# Patient Record
Sex: MALE | Race: WHITE | NOT HISPANIC OR LATINO | Employment: OTHER | URBAN - NONMETROPOLITAN AREA
[De-identification: names, ages, dates, MRNs, and addresses within clinical notes are randomized per-mention and may not be internally consistent; named-entity substitution may affect disease eponyms.]

---

## 2017-09-02 ENCOUNTER — HOSPITAL ENCOUNTER (EMERGENCY)
Facility: HOSPITAL | Age: 67
Discharge: HOME | DRG: 872 | End: 2017-09-02
Payer: MEDICARE

## 2017-09-02 VITALS
RESPIRATION RATE: 22 BRPM | DIASTOLIC BLOOD PRESSURE: 88 MMHG | OXYGEN SATURATION: 95 % | SYSTOLIC BLOOD PRESSURE: 130 MMHG | HEART RATE: 93 BPM | TEMPERATURE: 99.4 F

## 2017-09-02 DIAGNOSIS — J20.9 ACUTE BRONCHITIS, UNSPECIFIED ORGANISM: ICD-10-CM

## 2017-09-02 DIAGNOSIS — R31.9 HEMATURIA: ICD-10-CM

## 2017-09-02 LAB
ALBUMIN SERPL-MCNC: 3.5 G/DL (ref 3.4–5)
ALBUMIN UR-MCNC: 100 MG/DL
ALP SERPL-CCNC: 37 U/L (ref 40–150)
ALT SERPL W P-5'-P-CCNC: 31 U/L (ref 0–70)
ANION GAP SERPL CALCULATED.3IONS-SCNC: 8 MMOL/L (ref 3–14)
APPEARANCE UR: CLEAR
AST SERPL W P-5'-P-CCNC: 16 U/L (ref 0–45)
BACTERIA #/AREA URNS HPF: ABNORMAL /HPF
BASOPHILS # BLD AUTO: 0.1 10E9/L (ref 0–0.2)
BASOPHILS NFR BLD AUTO: 0.5 %
BILIRUB SERPL-MCNC: 1.8 MG/DL (ref 0.2–1.3)
BILIRUB UR QL STRIP: NEGATIVE
BUN SERPL-MCNC: 13 MG/DL (ref 7–30)
CALCIUM SERPL-MCNC: 8.7 MG/DL (ref 8.5–10.1)
CHLORIDE SERPL-SCNC: 105 MMOL/L (ref 94–109)
CO2 SERPL-SCNC: 24 MMOL/L (ref 20–32)
COLOR UR AUTO: YELLOW
CREAT SERPL-MCNC: 1.14 MG/DL (ref 0.66–1.25)
CRP SERPL-MCNC: 69.5 MG/L (ref 0–8)
DIFFERENTIAL METHOD BLD: ABNORMAL
EOSINOPHIL # BLD AUTO: 0 10E9/L (ref 0–0.7)
EOSINOPHIL NFR BLD AUTO: 0.2 %
ERYTHROCYTE [DISTWIDTH] IN BLOOD BY AUTOMATED COUNT: 12.4 % (ref 10–15)
GFR SERPL CREATININE-BSD FRML MDRD: 64 ML/MIN/1.7M2
GLUCOSE SERPL-MCNC: 124 MG/DL (ref 70–99)
GLUCOSE UR STRIP-MCNC: NEGATIVE MG/DL
HCT VFR BLD AUTO: 39.7 % (ref 40–53)
HGB BLD-MCNC: 14.4 G/DL (ref 13.3–17.7)
HGB UR QL STRIP: ABNORMAL
IMM GRANULOCYTES # BLD: 0 10E9/L (ref 0–0.4)
IMM GRANULOCYTES NFR BLD: 0.3 %
INR PPP: 1.43 (ref 0.8–1.2)
KETONES UR STRIP-MCNC: NEGATIVE MG/DL
LACTATE SERPL-SCNC: 1.3 MMOL/L (ref 0.4–2)
LEUKOCYTE ESTERASE UR QL STRIP: ABNORMAL
LYMPHOCYTES # BLD AUTO: 0.8 10E9/L (ref 0.8–5.3)
LYMPHOCYTES NFR BLD AUTO: 6.2 %
MCH RBC QN AUTO: 33.3 PG (ref 26.5–33)
MCHC RBC AUTO-ENTMCNC: 36.3 G/DL (ref 31.5–36.5)
MCV RBC AUTO: 92 FL (ref 78–100)
MONOCYTES # BLD AUTO: 1.1 10E9/L (ref 0–1.3)
MONOCYTES NFR BLD AUTO: 8.5 %
MUCOUS THREADS #/AREA URNS LPF: PRESENT /LPF
NEUTROPHILS # BLD AUTO: 11 10E9/L (ref 1.6–8.3)
NEUTROPHILS NFR BLD AUTO: 84.3 %
NITRATE UR QL: NEGATIVE
NRBC # BLD AUTO: 0 10*3/UL
NRBC BLD AUTO-RTO: 0 /100
NT-PROBNP SERPL-MCNC: 1650 PG/ML (ref 0–900)
PH UR STRIP: 6.5 PH (ref 4.7–8)
PLATELET # BLD AUTO: 183 10E9/L (ref 150–450)
POTASSIUM SERPL-SCNC: 3.8 MMOL/L (ref 3.4–5.3)
PROT SERPL-MCNC: 7 G/DL (ref 6.8–8.8)
RBC # BLD AUTO: 4.33 10E12/L (ref 4.4–5.9)
RBC #/AREA URNS AUTO: 10 /HPF (ref 0–2)
SODIUM SERPL-SCNC: 137 MMOL/L (ref 133–144)
SOURCE: ABNORMAL
SP GR UR STRIP: 1.02 (ref 1–1.03)
UROBILINOGEN UR STRIP-MCNC: 2 MG/DL (ref 0–2)
WBC # BLD AUTO: 13 10E9/L (ref 4–11)
WBC #/AREA URNS AUTO: 19 /HPF (ref 0–2)

## 2017-09-02 PROCEDURE — 99285 EMERGENCY DEPT VISIT HI MDM: CPT

## 2017-09-02 PROCEDURE — 93010 ELECTROCARDIOGRAM REPORT: CPT | Performed by: INTERNAL MEDICINE

## 2017-09-02 RX ORDER — LEVOFLOXACIN 5 MG/ML
750 INJECTION, SOLUTION INTRAVENOUS ONCE
Status: COMPLETED | OUTPATIENT
Start: 2017-09-02 | End: 2017-09-02

## 2017-09-02 RX ORDER — LEVOFLOXACIN 500 MG/1
500 TABLET, FILM COATED ORAL DAILY
Qty: 7 TABLET | Refills: 0 | Status: ON HOLD | OUTPATIENT
Start: 2017-09-02 | End: 2017-09-08

## 2017-09-02 RX ORDER — ONDANSETRON 2 MG/ML
4 INJECTION INTRAMUSCULAR; INTRAVENOUS EVERY 30 MIN PRN
Status: DISCONTINUED | OUTPATIENT
Start: 2017-09-02 | End: 2017-09-02 | Stop reason: HOSPADM

## 2017-09-02 RX ORDER — SODIUM CHLORIDE 9 MG/ML
INJECTION, SOLUTION INTRAVENOUS CONTINUOUS
Status: DISCONTINUED | OUTPATIENT
Start: 2017-09-02 | End: 2017-09-02 | Stop reason: HOSPADM

## 2017-09-02 RX ORDER — ACETAMINOPHEN 325 MG/1
975 TABLET ORAL ONCE
Status: DISCONTINUED | OUTPATIENT
Start: 2017-09-02 | End: 2017-09-02 | Stop reason: HOSPADM

## 2017-09-02 RX ADMIN — LEVOFLOXACIN 750 MG: 5 INJECTION, SOLUTION INTRAVENOUS at 17:02

## 2017-09-02 RX ADMIN — ONDANSETRON 4 MG: 2 INJECTION, SOLUTION INTRAMUSCULAR; INTRAVENOUS at 15:15

## 2017-09-02 RX ADMIN — SODIUM CHLORIDE 1000 ML: 9 INJECTION, SOLUTION INTRAVENOUS at 17:02

## 2017-09-02 RX ADMIN — SODIUM CHLORIDE 1000 ML: 9 INJECTION, SOLUTION INTRAVENOUS at 14:44

## 2017-09-02 ASSESSMENT — ENCOUNTER SYMPTOMS
RHINORRHEA: 1
ARTHRALGIAS: 0
FATIGUE: 1
DIFFICULTY URINATING: 0
ABDOMINAL PAIN: 0
CHILLS: 1
NECK STIFFNESS: 0
COUGH: 1
NAUSEA: 1
EYE REDNESS: 0
DIARRHEA: 1
ACTIVITY CHANGE: 1
CONFUSION: 0
COLOR CHANGE: 0
HEADACHES: 0
SINUS PRESSURE: 1
DIZZINESS: 1
SHORTNESS OF BREATH: 1
FEVER: 1
APPETITE CHANGE: 1

## 2017-09-02 NOTE — ED PROVIDER NOTES
History     Chief Complaint   Patient presents with     Fever     up to 100.2 at home     Chills     Nausea     HPI  Dakota Gaston is a 66 year old male with hx of afib on long term coumadin (off for last 2 days - scheduled for TKA 9/7 per Dr. Bender), CAD, s/p stent, HTN, presents to ED with daughter via private car for evaluation of fever, cough, congestion, nausea. Onset of fever and chills during the night last night. States he felt well yesterday, attended football game in rain last night. Fatigued when he got home, fever/chills during the night, cough, nausea, diarrhea this morning. Last dose of tylenol 2 hours PTA. Progressing weakness, fatigue. Last meal last night.     Allergies   Allergen Reactions     Penicillin G        No current facility-administered medications on file prior to encounter.   Current Outpatient Prescriptions on File Prior to Encounter:  metoprolol (TOPROL-XL) 100 MG 24 hr tablet Take 1.5 tablets (150 mg) by mouth daily   OMEPRAZOLE PO Take 40 mg by mouth every morning    SIMVASTATIN PO Take 40 mg by mouth At Bedtime.   warfarin (COUMADIN) 5 MG tablet Take 5 mg by mouth daily    nitroglycerin (NITROSTAT) 0.4 MG SL tablet Place  under the tongue every 5 minutes as needed.       Patient Active Problem List   Diagnosis     History of colon polyps     Subtherapeutic international normalized ratio (INR)     Atrial fibrillation (H)     Atypical chest pain     Renal insufficiency     Elevated brain natriuretic peptide (BNP) level     Influenza B       Past Surgical History:   Procedure Laterality Date     angioplasty with stenting[       arthroscopy left knee[      meniscus tear.     ARTHROTOMY SHOULDER, ROTATOR CUFF REPAIR, COMBINED      spur removed, right shoulder.     CARDIAC SURGERY       COLONOSCOPY       COLONOSCOPY  4/5/2013    Procedure: COLONOSCOPY;  colonoscopy with Polypectomy;  Surgeon: Vickey Martinez MD;  Location: [facility]     COLONOSCOPY N/A 8/14/2015    Procedure:  "COLONOSCOPY;  Surgeon: Vickey Martinez MD;  Location: [facility]     deviated septum repair[       discectomy and fusion[      C3-C6     ENT SURGERY       tube placement in ears[         Social History   Substance Use Topics     Smoking status: Former Smoker     Quit date: 4/1/2003     Smokeless tobacco: Never Used     Alcohol use No      Comment: rona       Most Recent Immunizations   Administered Date(s) Administered     Influenza (High Dose) 3 valent vaccine 02/16/2015     Pneumococcal 23 valent 02/16/2015       BMI: Estimated body mass index is 32.78 kg/(m^2) as calculated from the following:    Height as of 8/14/15: 1.803 m (5' 11\").    Weight as of 8/14/15: 106.6 kg (235 lb).      Review of Systems   Constitutional: Positive for activity change, appetite change, chills, fatigue and fever.   HENT: Positive for congestion, postnasal drip, rhinorrhea and sinus pressure.    Eyes: Negative for redness.   Respiratory: Positive for cough and shortness of breath.    Cardiovascular: Negative for chest pain.   Gastrointestinal: Positive for diarrhea and nausea. Negative for abdominal pain.   Genitourinary: Negative for difficulty urinating.   Musculoskeletal: Negative for arthralgias and neck stiffness.   Skin: Negative for color change.   Neurological: Positive for dizziness. Negative for headaches.   Psychiatric/Behavioral: Negative for confusion.       Physical Exam   BP: 158/97  Pulse: 104  Temp: (!) 102.4  F (39.1  C)  Resp: 24  SpO2: 95 %  Physical Exam   Constitutional: He is oriented to person, place, and time. No distress.   HENT:   Head: Normocephalic and atraumatic.   Right Ear: Tympanic membrane is scarred. Tympanic membrane is not erythematous.   Left Ear: Tympanic membrane is scarred. Tympanic membrane is not erythematous.   Nose: Mucosal edema and rhinorrhea present. Right sinus exhibits frontal sinus tenderness. Left sinus exhibits frontal sinus tenderness.   Mouth/Throat: Uvula is midline and oropharynx " is clear and moist.   Eyes: Conjunctivae are normal.   Neck: Normal range of motion. Neck supple.   Cardiovascular: An irregularly irregular rhythm present. Tachycardia present.    Pulmonary/Chest: He has no wheezes. He has rhonchi in the right middle field and the right lower field.   Abdominal: Soft. Bowel sounds are normal. He exhibits no distension. There is no tenderness.   Musculoskeletal: Normal range of motion.   Neurological: He is alert and oriented to person, place, and time.   Skin: Skin is warm and dry. He is not diaphoretic.   Nursing note and vitals reviewed.      ED Course     Procedures           Labs Ordered and Resulted from Time of ED Arrival Up to the Time of Departure from the ED   UA MACROSCOPIC WITH REFLEX TO MICRO AND CULTURE - Abnormal; Notable for the following:        Result Value    Blood Urine Trace (*)     Protein Albumin Urine 100 (*)     Leukocyte Esterase Urine Small (*)     RBC Urine 10 (*)     WBC Urine 19 (*)     Bacteria Urine None (*)     Mucous Urine Present (*)     All other components within normal limits   CBC WITH PLATELETS DIFFERENTIAL - Abnormal; Notable for the following:     WBC 13.0 (*)     RBC Count 4.33 (*)     Hematocrit 39.7 (*)     MCH 33.3 (*)     Absolute Neutrophil 11.0 (*)     All other components within normal limits   COMPREHENSIVE METABOLIC PANEL - Abnormal; Notable for the following:     Glucose 124 (*)     Bilirubin Total 1.8 (*)     Alkaline Phosphatase 37 (*)     All other components within normal limits   CRP INFLAMMATION - Abnormal; Notable for the following:     CRP Inflammation 69.5 (*)     All other components within normal limits   INR - Abnormal; Notable for the following:     INR 1.43 (*)     All other components within normal limits   NT PROBNP INPATIENT - Abnormal; Notable for the following:     N-Terminal Pro BNP Inpatient 1650 (*)     All other components within normal limits   LACTIC ACID   PERIPHERAL IV CATHETER   PULSE OXIMETRY NURSING    CARDIAC CONTINUOUS MONITORING   NURSING DRAW AND HOLD   NURSING DRAW AND HOLD   NURSING DRAW AND HOLD            EKG Interpretation:      Interpreted by Aleksandra Luis  Time reviewed:1500   Symptoms at time of EKG: fever, nausea   Rhythm: Normal sinus  and Atrial fibrillation - controlled  Rate: 98  Axis: Normal  Ectopy: None  Conduction: Normal  ST Segments/ T Waves: No ST-T wave changes and No acute ischemic changes  Q Waves: None  Comparison to prior: Unchanged    Clinical Impression: atrial fibrillation (chronic)      Assessments & Plan (with Medical Decision Making)   Pt presents with 1 day fever, myalgias, cough, sob, sinus congestion. Temp on arrival 102.4 (t),  - afib per ECG.   Labs show elevated wbc 13.0 with left shift, normal lactic acid. CXR appears clear, rhonchi. UA shows trace amt blood, small amt LE, 19 WBC - pt is asymptomatic. BNP 1650, similar to previous.   Findings consistent with bronchitis, ? Early pneumonia. Will treat with levaquin which will also cover abnormal UA while awaiting culture. Pt verbalizes understanding and agrees with plan.  Epic discharge instructions given. Pt discharged in stable condition.     I have reviewed the nursing notes.    I have reviewed the findings, diagnosis, plan and need for follow up with the patient.    Discharge Medication List as of 9/2/2017  6:32 PM      START taking these medications    Details   levofloxacin (LEVAQUIN) 500 MG tablet Take 1 tablet (500 mg) by mouth daily for 7 days, Disp-7 tablet, R-0, E-Prescribe             Final diagnoses:   Acute bronchitis, unspecified organism   Hematuria     See attached for home care  Rest, increase fluids  Take antibiotic as prescribed  F/U with PCP in 1 week for repeat UA  Return to ED with worsening sx.   9/2/2017   [facility]     Aleksandra Luis APRN FNP  09/03/17 [id number]

## 2017-09-02 NOTE — ED NOTES
DC instructions reviewed with patient and family.  Both verbalize understanding and have no further questions.  Patient is aware that there is an RX at [facility] for him to start tomorrow.  Patient will follow up in clinic in 1 week or will return to the emergency room if symptoms worsen.

## 2017-09-02 NOTE — ED AVS SNAPSHOT
[facility]    [address] [address]    Phone:  [phone]                                       Dakota Gaston   MRN: [id number]    Department:  [facility]   Date of Visit:  9/2/2017           After Visit Summary Signature Page     I have received my discharge instructions, and my questions have been answered. I have discussed any challenges I see with this plan with the nurse or doctor.    ..........................................................................................................................................  Patient/Patient Representative Signature      ..........................................................................................................................................  Patient Representative Print Name and Relationship to Patient    ..................................................               ................................................  Date                                            Time    ..........................................................................................................................................  Reviewed by Signature/Title    ...................................................              ..............................................  Date                                                            Time

## 2017-09-02 NOTE — ED NOTES
Face to face report given with opportunity to observe patient.    Report given to MAIRA Childress   9/2/2017  5:10 PM

## 2017-09-02 NOTE — DISCHARGE INSTRUCTIONS
See attached for home care  Rest, increase fluids  Take antibiotic as prescribed  F/U with PCP in 1 week for repeat UA  Return to ED with worsening sx.   Bronchitis, Antibiotic Treatment (Adult)    Bronchitis is an infection of the air passages (bronchial tubes) in your lungs. It often occurs when you have a cold. This illness is contagious during the first few days and is spread through the air by coughing and sneezing, or by direct contact (touching the sick person and then touching your own eyes, nose, or mouth).  Symptoms of bronchitis include cough with mucus (phlegm) and low-grade fever. Bronchitis usually lasts 7 to 14 days. Mild cases can be treated with simple home remedies. More severe infection is treated with an antibiotic.  Home care  Follow these guidelines when caring for yourself at home:    If your symptoms are severe, rest at home for the first 2 to 3 days. When you go back to your usual activities, don't let yourself get too tired.    Do not smoke. Also avoid being exposed to secondhand smoke.    You may use over-the-counter medicines to control fever or pain, unless another medicine was prescribed. (Note: If you have chronic liver or kidney disease or have ever had a stomach ulcer or gastrointestinal bleeding, talk with your healthcare provider before using these medicines. Also talk to your provider if you are taking medicine to prevent blood clots.) Aspirin should never be given to anyone younger than 18 years of age who is ill with a viral infection or fever. It may cause severe liver or brain damage.    Your appetite may be poor, so a light diet is fine. Avoid dehydration by drinking 6 to 8 glasses of fluids per day (such as water, soft drinks, sports drinks, juices, tea, or soup). Extra fluids will help loosen secretions in the nose and lungs.    Over-the-counter cough, cold, and sore-throat medicines will not shorten the length of the illness, but they may be helpful to reduce symptoms.  (Note: Do not use decongestants if you have high blood pressure.)    Finish all antibiotic medicine. Do this even if you are feeling better after only a few days.  Follow-up care  Follow up with your healthcare provider, or as advised. If you had an X-ray or ECG (electrocardiogram), a specialist will review it. You will be notified of any new findings that may affect your care.  Note: If you are age 65 or older, or if you have a chronic lung disease or condition that affects your immune system, or you smoke, talk to your healthcare provider about having pneumococcal vaccinations and a yearly influenza vaccination (flu shot).  When to seek medical advice  Call your healthcare provider right away if any of these occur:    Fever of 100.4 F (38 C) or higher    Coughing up increased amounts of colored sputum    Weakness, drowsiness, headache, facial pain, ear pain, or a stiff neck  Call 911, or get immediate medical care  Contact emergency services right away if any of these occur.    Coughing up blood    Worsening weakness, drowsiness, headache, or stiff neck    Trouble breathing, wheezing, or pain with breathing  Date Last Reviewed: 9/13/2015 2000-2017 [employer]. [address] [place] [address], [place] [address]. All rights reserved. This information is not intended as a substitute for professional medical care. Always follow your healthcare professional's instructions.

## 2017-09-02 NOTE — ED AVS SNAPSHOT
[facility]    [address] [address]    Phone:  [phone]                                       Dakota Gaston   MRN: [id number]    Department:  [facility]   Date of Visit:  9/2/2017           Patient Information     Date Of Birth          1950        Your diagnoses for this visit were:     Acute bronchitis, unspecified organism     Hematuria        You were seen by Aleksandra Luis APRN FNP.      Follow-up Information     Follow up with Willow Gray MD In 1 week.    Specialty:  Family Practice    Why:  recheck    Contact information:    [facility]  [address] [facility] [address] [phone]          Follow up with [facility].    Specialty:  EMERGENCY MEDICINE    Why:  As needed, If symptoms worsen    Contact information:    [address] [address] [phone]    Additional information:    From [place] Area: Take [address]. Turn left at [address]. Turn left at the first stoplight on [address]. At the first stop sign, take a right onto [address]. Take a left into the parking lot and continue through until you reach the North enterance of the building.       From [place]: Take [address]. Take the [address] ramp towards [place]. Turn left onto [address]. Take a slight right onto [address]. Turn left at the first stoplight on [address]. At the first stop sign, take a right onto [address]. Take a left into the parking lot and continue through until you reach the North enterance of the building.       From [place]: Take [address]. Take a right at [address]. At the first stop sign, take a right onto [address]. Take a left into the parking lot and continue through until you reach the North enterance of the building.         Discharge Instructions         See attached for home care  Rest, increase  fluids  Take antibiotic as prescribed  F/U with PCP in 1 week for repeat UA  Return to ED with worsening sx.   Bronchitis, Antibiotic Treatment (Adult)    Bronchitis is an infection of the air passages (bronchial tubes) in your lungs. It often occurs when you have a cold. This illness is contagious during the first few days and is spread through the air by coughing and sneezing, or by direct contact (touching the sick person and then touching your own eyes, nose, or mouth).  Symptoms of bronchitis include cough with mucus (phlegm) and low-grade fever. Bronchitis usually lasts 7 to 14 days. Mild cases can be treated with simple home remedies. More severe infection is treated with an antibiotic.  Home care  Follow these guidelines when caring for yourself at home:    If your symptoms are severe, rest at home for the first 2 to 3 days. When you go back to your usual activities, don't let yourself get too tired.    Do not smoke. Also avoid being exposed to secondhand smoke.    You may use over-the-counter medicines to control fever or pain, unless another medicine was prescribed. (Note: If you have chronic liver or kidney disease or have ever had a stomach ulcer or gastrointestinal bleeding, talk with your healthcare provider before using these medicines. Also talk to your provider if you are taking medicine to prevent blood clots.) Aspirin should never be given to anyone younger than 18 years of age who is ill with a viral infection or fever. It may cause severe liver or brain damage.    Your appetite may be poor, so a light diet is fine. Avoid dehydration by drinking 6 to 8 glasses of fluids per day (such as water, soft drinks, sports drinks, juices, tea, or soup). Extra fluids will help loosen secretions in the nose and lungs.    Over-the-counter cough, cold, and sore-throat medicines will not shorten the length of the illness, but they may be helpful to reduce symptoms. (Note: Do not use decongestants if you have  high blood pressure.)    Finish all antibiotic medicine. Do this even if you are feeling better after only a few days.  Follow-up care  Follow up with your healthcare provider, or as advised. If you had an X-ray or ECG (electrocardiogram), a specialist will review it. You will be notified of any new findings that may affect your care.  Note: If you are age 65 or older, or if you have a chronic lung disease or condition that affects your immune system, or you smoke, talk to your healthcare provider about having pneumococcal vaccinations and a yearly influenza vaccination (flu shot).  When to seek medical advice  Call your healthcare provider right away if any of these occur:    Fever of 100.4 F (38 C) or higher    Coughing up increased amounts of colored sputum    Weakness, drowsiness, headache, facial pain, ear pain, or a stiff neck  Call 911, or get immediate medical care  Contact emergency services right away if any of these occur.    Coughing up blood    Worsening weakness, drowsiness, headache, or stiff neck    Trouble breathing, wheezing, or pain with breathing  Date Last Reviewed: 9/13/2015 2000-2017 [employer] [address] [place] [address]. All rights reserved. This information is not intended as a substitute for professional medical care. Always follow your healthcare professional's instructions.             Review of your medicines      START taking        Dose / Directions Last dose taken    levofloxacin 500 MG tablet   Commonly known as:  LEVAQUIN   Dose:  500 mg   Quantity:  7 tablet        Take 1 tablet (500 mg) by mouth daily for 7 days   Refills:  0          Our records show that you are taking the medicines listed below. If these are incorrect, please call your family doctor or clinic.        Dose / Directions Last dose taken    COUMADIN 5 MG tablet   Dose:  5 mg   Generic drug:  warfarin        Take 5 mg by mouth daily   Refills:  0        FISH OIL PO        Refills:   0        metoprolol 100 MG 24 hr tablet   Commonly known as:  TOPROL-XL   Dose:  150 mg   Quantity:  135 tablet        Take 1.5 tablets (150 mg) by mouth daily   Refills:  3        nitroGLYcerin 0.4 MG sublingual tablet   Commonly known as:  NITROSTAT        Place  under the tongue every 5 minutes as needed.   Refills:  0        OMEPRAZOLE PO   Dose:  40 mg        Take 40 mg by mouth every morning   Refills:  0        SIMVASTATIN PO   Dose:  40 mg        Take 40 mg by mouth At Bedtime.   Refills:  0                Prescriptions were sent or printed at these locations (1 Prescription)                   [id number] - [place], [place] - [address]   [address] , [place] [address]    Telephone:  [phone]   Fax:  [phone]   Hours:                  E-Prescribed (1 of 1)         levofloxacin (LEVAQUIN) 500 MG tablet                Procedures and tests performed during your visit     Procedure/Test Number of Times Performed    CBC with platelets differential 1    CRP inflammation 1    Cardiac Continuous Monitoring 1    Comprehensive metabolic panel 1    Draw and hold 3    EKG 12 lead 1    INR 1    Lactic acid 1    Nt probnp inpatient (BNP) 1    Peripheral IV: Standard 1    Pulse oximetry nursing 1    UA reflex to Microscopic and Culture 1    Urine Culture Aerobic Bacterial 1    XR Chest 2 Views 1      Orders Needing Specimen Collection     None      Pending Results     Date and Time Order Name Status Description    9/2/2017 1548 Urine Culture Aerobic Bacterial In process     9/2/2017 1441 XR Chest 2 Views In process             Pending Culture Results     Date and Time Order Name Status Description    9/2/2017 1548 Urine Culture Aerobic Bacterial In process             Thank you for choosing [facility]       Thank you for choosing [facility] for your care. Our goal is always to provide you with excellent care. Hearing back from our patients is one way we can continue to improve our services. Please take a few  "minutes to complete the written survey that you may receive in the mail after you visit with us. Thank you!        [employer] Information     [employer] lets you send messages to your doctor, view your test results, renew your prescriptions, schedule appointments and more. To sign up, go to [email or web address] . Click on \"Log in\" on the left side of the screen, which will take you to the Welcome page. Then click on \"Sign up Now\" on the right side of the page.     You will be asked to enter the access code listed below, as well as some personal information. Please follow the directions to create your username and password.     Your access code is: [email or web address]  Expires: 2017  6:29 PM     Your access code will  in 90 days. If you need help or a new code, please call your [facility] clinic or [phone].        Care EveryWhere ID     This is your Care EveryWhere ID. This could be used by other organizations to access your [facility] medical records  [id number]        Equal Access to Services     [facility]: Hadii gabi Small, waaxda luqadaha, qaybta kaalmabita muse, gino noble . So [facility] [place] [phone].    ATENCIÓN: Si habla español, tiene a tomlin disposición servicios gratuitos de asistencia lingüística. Llame al [phone].    We comply with applicable federal civil rights laws and [place] laws. We do not discriminate on the basis of race, color, national origin, age, disability sex, sexual orientation or gender identity.            After Visit Summary       This is your record. Keep this with you and show to your community pharmacist(s) and doctor(s) at your next visit.                  "

## 2017-09-03 ENCOUNTER — HOSPITAL ENCOUNTER (INPATIENT)
Facility: HOSPITAL | Age: 67
LOS: 4 days | Discharge: HOME | DRG: 872 | End: 2017-09-08
Admitting: FAMILY MEDICINE
Payer: MEDICARE

## 2017-09-03 DIAGNOSIS — R31.9 URINARY TRACT INFECTION WITH HEMATURIA, SITE UNSPECIFIED: ICD-10-CM

## 2017-09-03 DIAGNOSIS — N39.0 URINARY TRACT INFECTION WITH HEMATURIA, SITE UNSPECIFIED: ICD-10-CM

## 2017-09-03 DIAGNOSIS — R78.81 POSITIVE BLOOD CULTURE: ICD-10-CM

## 2017-09-03 DIAGNOSIS — R09.81 CONGESTION OF PARANASAL SINUS: ICD-10-CM

## 2017-09-03 LAB
ALBUMIN SERPL-MCNC: 3.2 G/DL (ref 3.4–5)
ALP SERPL-CCNC: 32 U/L (ref 40–150)
ALT SERPL W P-5'-P-CCNC: 27 U/L (ref 0–70)
ANION GAP SERPL CALCULATED.3IONS-SCNC: 8 MMOL/L (ref 3–14)
AST SERPL W P-5'-P-CCNC: 15 U/L (ref 0–45)
BASOPHILS # BLD AUTO: 0 10E9/L (ref 0–0.2)
BASOPHILS NFR BLD AUTO: 0.3 %
BILIRUB SERPL-MCNC: 1.4 MG/DL (ref 0.2–1.3)
BUN SERPL-MCNC: 15 MG/DL (ref 7–30)
CALCIUM SERPL-MCNC: 8.8 MG/DL (ref 8.5–10.1)
CHLORIDE SERPL-SCNC: 104 MMOL/L (ref 94–109)
CO2 SERPL-SCNC: 23 MMOL/L (ref 20–32)
CREAT SERPL-MCNC: 1.13 MG/DL (ref 0.66–1.25)
D DIMER PPP DDU-MCNC: 246 NG/ML D-DU (ref 0–300)
DIFFERENTIAL METHOD BLD: ABNORMAL
EOSINOPHIL # BLD AUTO: 0 10E9/L (ref 0–0.7)
EOSINOPHIL NFR BLD AUTO: 0 %
ERYTHROCYTE [DISTWIDTH] IN BLOOD BY AUTOMATED COUNT: 12.5 % (ref 10–15)
GFR SERPL CREATININE-BSD FRML MDRD: 65 ML/MIN/1.7M2
GLUCOSE SERPL-MCNC: 188 MG/DL (ref 70–99)
HCT VFR BLD AUTO: 38.1 % (ref 40–53)
HGB BLD-MCNC: 13.3 G/DL (ref 13.3–17.7)
IMM GRANULOCYTES # BLD: 0 10E9/L (ref 0–0.4)
IMM GRANULOCYTES NFR BLD: 0.3 %
INR PPP: 1.4 (ref 0.8–1.2)
LACTATE SERPL-SCNC: 1.8 MMOL/L (ref 0.4–2)
LYMPHOCYTES # BLD AUTO: 0.4 10E9/L (ref 0.8–5.3)
LYMPHOCYTES NFR BLD AUTO: 4.4 %
MCH RBC QN AUTO: 32.5 PG (ref 26.5–33)
MCHC RBC AUTO-ENTMCNC: 34.9 G/DL (ref 31.5–36.5)
MCV RBC AUTO: 93 FL (ref 78–100)
MONOCYTES # BLD AUTO: 0.5 10E9/L (ref 0–1.3)
MONOCYTES NFR BLD AUTO: 5.2 %
NEUTROPHILS # BLD AUTO: 8.9 10E9/L (ref 1.6–8.3)
NEUTROPHILS NFR BLD AUTO: 89.8 %
NRBC # BLD AUTO: 0 10*3/UL
NRBC BLD AUTO-RTO: 0 /100
PLATELET # BLD AUTO: 156 10E9/L (ref 150–450)
POTASSIUM SERPL-SCNC: 3.8 MMOL/L (ref 3.4–5.3)
PROT SERPL-MCNC: 6.7 G/DL (ref 6.8–8.8)
PSA SERPL-ACNC: 8.91 UG/L (ref 0–4)
RBC # BLD AUTO: 4.09 10E12/L (ref 4.4–5.9)
SODIUM SERPL-SCNC: 135 MMOL/L (ref 133–144)
TROPONIN I SERPL-MCNC: <0.015 UG/L (ref 0–0.04)
WBC # BLD AUTO: 9.9 10E9/L (ref 4–11)

## 2017-09-03 PROCEDURE — 99284 EMERGENCY DEPT VISIT MOD MDM: CPT

## 2017-09-03 PROCEDURE — G0378 HOSPITAL OBSERVATION PER HR: HCPCS

## 2017-09-03 PROCEDURE — 80053 COMPREHEN METABOLIC PANEL: CPT

## 2017-09-03 PROCEDURE — 25000132 ZZH RX MED GY IP 250 OP 250 PS 637: Mod: GY

## 2017-09-03 PROCEDURE — 70486 CT MAXILLOFACIAL W/O DYE: CPT | Mod: TC

## 2017-09-03 PROCEDURE — 96376 TX/PRO/DX INJ SAME DRUG ADON: CPT

## 2017-09-03 PROCEDURE — 25000128 H RX IP 250 OP 636: Performed by: FAMILY MEDICINE

## 2017-09-03 PROCEDURE — 85610 PROTHROMBIN TIME: CPT

## 2017-09-03 PROCEDURE — 40000786 ZZHCL STATISTIC ACTIVE MRSA SURVEILLANCE CULTURE: Performed by: FAMILY MEDICINE

## 2017-09-03 PROCEDURE — 83605 ASSAY OF LACTIC ACID: CPT

## 2017-09-03 PROCEDURE — 71020 ZZHC CHEST TWO VIEWS, FRONT/LAT: CPT | Mod: TC

## 2017-09-03 PROCEDURE — G0103 PSA SCREENING: HCPCS

## 2017-09-03 PROCEDURE — 96372 THER/PROPH/DIAG INJ SC/IM: CPT | Mod: XS

## 2017-09-03 PROCEDURE — 99285 EMERGENCY DEPT VISIT HI MDM: CPT | Mod: 25

## 2017-09-03 PROCEDURE — 84484 ASSAY OF TROPONIN QUANT: CPT

## 2017-09-03 PROCEDURE — A9270 NON-COVERED ITEM OR SERVICE: HCPCS | Mod: GY | Performed by: FAMILY MEDICINE

## 2017-09-03 PROCEDURE — 85379 FIBRIN DEGRADATION QUANT: CPT

## 2017-09-03 PROCEDURE — 96375 TX/PRO/DX INJ NEW DRUG ADDON: CPT

## 2017-09-03 PROCEDURE — 96361 HYDRATE IV INFUSION ADD-ON: CPT

## 2017-09-03 PROCEDURE — A9270 NON-COVERED ITEM OR SERVICE: HCPCS | Mod: GY

## 2017-09-03 PROCEDURE — 85025 COMPLETE CBC W/AUTO DIFF WBC: CPT

## 2017-09-03 PROCEDURE — 96365 THER/PROPH/DIAG IV INF INIT: CPT

## 2017-09-03 PROCEDURE — 25000132 ZZH RX MED GY IP 250 OP 250 PS 637: Mod: GY | Performed by: FAMILY MEDICINE

## 2017-09-03 PROCEDURE — 25000128 H RX IP 250 OP 636

## 2017-09-03 RX ORDER — MORPHINE SULFATE 2 MG/ML
2-4 INJECTION, SOLUTION INTRAMUSCULAR; INTRAVENOUS
Status: DISCONTINUED | OUTPATIENT
Start: 2017-09-03 | End: 2017-09-08 | Stop reason: HOSPADM

## 2017-09-03 RX ORDER — CEFTRIAXONE SODIUM 1 G/50ML
1 INJECTION, SOLUTION INTRAVENOUS ONCE
Status: COMPLETED | OUTPATIENT
Start: 2017-09-03 | End: 2017-09-03

## 2017-09-03 RX ORDER — IBUPROFEN 800 MG/1
800 TABLET, FILM COATED ORAL ONCE
Status: COMPLETED | OUTPATIENT
Start: 2017-09-03 | End: 2017-09-03

## 2017-09-03 RX ORDER — SIMVASTATIN 40 MG
40 TABLET ORAL AT BEDTIME
Status: DISCONTINUED | OUTPATIENT
Start: 2017-09-04 | End: 2017-09-08 | Stop reason: HOSPADM

## 2017-09-03 RX ORDER — NITROGLYCERIN 0.4 MG/1
0.4 TABLET SUBLINGUAL EVERY 5 MIN PRN
Status: DISCONTINUED | OUTPATIENT
Start: 2017-09-03 | End: 2017-09-08 | Stop reason: HOSPADM

## 2017-09-03 RX ORDER — PROCHLORPERAZINE MALEATE 5 MG
5 TABLET ORAL EVERY 6 HOURS PRN
Status: DISCONTINUED | OUTPATIENT
Start: 2017-09-03 | End: 2017-09-08 | Stop reason: HOSPADM

## 2017-09-03 RX ORDER — AMOXICILLIN 250 MG
1-2 CAPSULE ORAL 2 TIMES DAILY PRN
Status: DISCONTINUED | OUTPATIENT
Start: 2017-09-03 | End: 2017-09-08 | Stop reason: HOSPADM

## 2017-09-03 RX ORDER — SACCHAROMYCES BOULARDII 250 MG
250 CAPSULE ORAL 2 TIMES DAILY
Status: DISCONTINUED | OUTPATIENT
Start: 2017-09-03 | End: 2017-09-08 | Stop reason: HOSPADM

## 2017-09-03 RX ORDER — NALOXONE HYDROCHLORIDE 0.4 MG/ML
.1-.4 INJECTION, SOLUTION INTRAMUSCULAR; INTRAVENOUS; SUBCUTANEOUS
Status: DISCONTINUED | OUTPATIENT
Start: 2017-09-03 | End: 2017-09-08 | Stop reason: HOSPADM

## 2017-09-03 RX ORDER — ACETAMINOPHEN 325 MG/1
650 TABLET ORAL EVERY 4 HOURS PRN
Status: DISCONTINUED | OUTPATIENT
Start: 2017-09-03 | End: 2017-09-08 | Stop reason: HOSPADM

## 2017-09-03 RX ORDER — IBUPROFEN 200 MG
200 TABLET ORAL EVERY 6 HOURS PRN
Status: DISCONTINUED | OUTPATIENT
Start: 2017-09-03 | End: 2017-09-08 | Stop reason: HOSPADM

## 2017-09-03 RX ORDER — OXYMETAZOLINE HYDROCHLORIDE 0.05 G/100ML
2 SPRAY NASAL 2 TIMES DAILY
Status: DISCONTINUED | OUTPATIENT
Start: 2017-09-03 | End: 2017-09-08 | Stop reason: HOSPADM

## 2017-09-03 RX ORDER — ONDANSETRON 2 MG/ML
4 INJECTION INTRAMUSCULAR; INTRAVENOUS EVERY 6 HOURS PRN
Status: DISCONTINUED | OUTPATIENT
Start: 2017-09-03 | End: 2017-09-08 | Stop reason: HOSPADM

## 2017-09-03 RX ORDER — METOPROLOL SUCCINATE 100 MG/1
200 TABLET, EXTENDED RELEASE ORAL DAILY
Status: DISCONTINUED | OUTPATIENT
Start: 2017-09-04 | End: 2017-09-08 | Stop reason: HOSPADM

## 2017-09-03 RX ORDER — LEVOFLOXACIN 5 MG/ML
750 INJECTION, SOLUTION INTRAVENOUS EVERY 24 HOURS
Status: DISCONTINUED | OUTPATIENT
Start: 2017-09-03 | End: 2017-09-05

## 2017-09-03 RX ORDER — PROCHLORPERAZINE 25 MG
12.5 SUPPOSITORY, RECTAL RECTAL EVERY 12 HOURS PRN
Status: DISCONTINUED | OUTPATIENT
Start: 2017-09-03 | End: 2017-09-08 | Stop reason: HOSPADM

## 2017-09-03 RX ORDER — ONDANSETRON 4 MG/1
4 TABLET, ORALLY DISINTEGRATING ORAL EVERY 6 HOURS PRN
Status: DISCONTINUED | OUTPATIENT
Start: 2017-09-03 | End: 2017-09-08 | Stop reason: HOSPADM

## 2017-09-03 RX ORDER — SODIUM CHLORIDE 9 MG/ML
1000 INJECTION, SOLUTION INTRAVENOUS CONTINUOUS
Status: DISCONTINUED | OUTPATIENT
Start: 2017-09-03 | End: 2017-09-05

## 2017-09-03 RX ORDER — ACETAMINOPHEN 650 MG/1
650 SUPPOSITORY RECTAL EVERY 4 HOURS PRN
Status: DISCONTINUED | OUTPATIENT
Start: 2017-09-03 | End: 2017-09-08 | Stop reason: HOSPADM

## 2017-09-03 RX ORDER — HYDROCODONE BITARTRATE AND ACETAMINOPHEN 5; 325 MG/1; MG/1
1-2 TABLET ORAL EVERY 4 HOURS PRN
Status: DISCONTINUED | OUTPATIENT
Start: 2017-09-03 | End: 2017-09-08 | Stop reason: HOSPADM

## 2017-09-03 RX ORDER — LIDOCAINE 40 MG/G
CREAM TOPICAL
Status: DISCONTINUED | OUTPATIENT
Start: 2017-09-03 | End: 2017-09-08 | Stop reason: HOSPADM

## 2017-09-03 RX ADMIN — SODIUM CHLORIDE 1000 ML: 9 INJECTION, SOLUTION INTRAVENOUS at 13:21

## 2017-09-03 RX ADMIN — IBUPROFEN 200 MG: 200 TABLET, FILM COATED ORAL at 23:45

## 2017-09-03 RX ADMIN — SODIUM CHLORIDE 1000 ML: 9 INJECTION, SOLUTION INTRAVENOUS at 12:16

## 2017-09-03 RX ADMIN — CEFTRIAXONE SODIUM 1 G: 1 INJECTION, SOLUTION INTRAVENOUS at 13:30

## 2017-09-03 RX ADMIN — LEVOFLOXACIN 750 MG: 5 INJECTION, SOLUTION INTRAVENOUS at 16:52

## 2017-09-03 RX ADMIN — ENOXAPARIN SODIUM 40 MG: 40 INJECTION SUBCUTANEOUS at 19:07

## 2017-09-03 RX ADMIN — ACETAMINOPHEN 650 MG: 325 TABLET, FILM COATED ORAL at 19:51

## 2017-09-03 RX ADMIN — Medication 250 MG: at 21:16

## 2017-09-03 RX ADMIN — VANCOMYCIN HYDROCHLORIDE 1750 MG: 1 INJECTION, POWDER, LYOPHILIZED, FOR SOLUTION INTRAVENOUS at 19:07

## 2017-09-03 RX ADMIN — IBUPROFEN 800 MG: 800 TABLET ORAL at 13:17

## 2017-09-03 ASSESSMENT — ENCOUNTER SYMPTOMS
FEVER: 1
CHILLS: 1
ACTIVITY CHANGE: 1
SINUS PRESSURE: 1
SHORTNESS OF BREATH: 1
NAUSEA: 1
APPETITE CHANGE: 1
FATIGUE: 1

## 2017-09-03 NOTE — PLAN OF CARE
[facility] Inpatient Admission Note:    Patient admitted to 3226/3226-1 at approximately 14:15 via cart accompanied by staff from emergency room . Report received from Melva RAO in SBAR format at 14:00 via telephone. Patient transferred to bed via self.. Patient is alert and oriented X 3, denies pain; rates at 0 on 0-10 scale.  Patient oriented to room, unit, hourly rounding, and plan of care. Explained admission packet and patient handbook with patient bill of rights brochure. Will continue to monitor and document as needed.     Inpatient Nursing criteria listed below was met:      Health care directives status obtained and documented: Yes      Care Everywhere authorization obtained N/A      MRSA swab completed for patient 65 years and older: pending      Patient identifies a surrogate decision maker: Yes If yes, who: daughter Lashawn Luque Contact Information:[phone]      Core Measure diagnosis present:No. If yes, state diagnosis: N/A       If initial lactic acid >2.0, repeat lactic acid drawn within one hour of arrival to unit: NA. If no, state reason: N/A      Vaccination assessment and education completed: Yes   Vaccinations received prior to admission: Pneumovax patient unsure when he had last vaccine  Influenza(seasonal)  N/A   Vaccination(s) ordered: not given today because refused      Clergy visit ordered if patient requests: N/A      Skin issues/needs documented: N/A      Isolation Patient: no Education given, correct sign in place and documentation row added to PCS:  N/A      Fall Prevention N/A: Care plan updated, education given and documented, sticker and magnet in place: N/A      Care Plan initiated: Yes      Education Documented (including assessment): Yes      Patient has discharge needs : No If yes, please explain:N/A

## 2017-09-03 NOTE — PHARMACY-VANCOMYCIN DOSING SERVICE
Pharmacy Vancomycin Initial Note  Date of Service September 3, 2017  Patient's  1950  66 year old, male    Indication: Urinary Tract Infection    Current estimated CrCl = Estimated Creatinine Clearance: 82.3 mL/min (based on Cr of 1.13).    Creatinine for last 3 days  2017:  2:58 PM Creatinine 1.14 mg/dL  9/3/2017: 12:15 PM Creatinine 1.13 mg/dL    Recent Vancomycin Level(s) for last 3 days  No results found for requested labs within last 72 hours.      Vancomycin IV Administrations (past 72 hours)      No vancomycin orders with administrations in past 72 hours.                Nephrotoxins and other renal medications (Future)    Start     Dose/Rate Route Frequency Ordered Stop    17 1830  vancomycin (VANCOCIN) 1,750 mg in NaCl 0.9 % 500 mL intermittent infusion      1,750 mg  284 mL/hr over 2 Hours Intravenous EVERY 12 HOURS 17 1658            Contrast Orders - past 72 hours     None                Plan:  1.  Start vancomycin  1750 mg IV q12h.   2.  Goal Trough Level: 10-15 mg/L   3.  Pharmacy will check trough levels as appropriate in 1-3 Days.    4. Serum creatinine levels will be ordered daily for the first week of therapy and at least twice weekly for subsequent weeks.    5. Granger method utilized to dose vancomycin therapy: Method 2 and Method 1    Stefany Joaquin

## 2017-09-03 NOTE — PLAN OF CARE
Problem: Goal Outcome Summary  Goal: Goal Outcome Summary  Outcome: No Change  Patient has just been admitted with febrile illness, sinusitis, and UTI. Patient alert and oriented, makes needs known fever is coming down from ER report, patient very diaphoretic, patient daughter Lashawn Luque is his contact, her number is . Patient has a history of cervical spine fusion and patient has chronic pain, however patient is reporting he is comfortable a present, patient has A Fib, and has not taken fish oil or warfarin due to pending knee surgery next Thursday.

## 2017-09-03 NOTE — IP AVS SNAPSHOT
[facility]    [address] [place] [id number]    Phone:  [phone]    Fax:  [phone]                                       After Visit Summary   9/3/2017    Dakota Gaston    MRN: [id number]           After Visit Summary Signature Page     I have received my discharge instructions, and my questions have been answered. I have discussed any challenges I see with this plan with the nurse or doctor.    ..........................................................................................................................................  Patient/Patient Representative Signature      ..........................................................................................................................................  Patient Representative Print Name and Relationship to Patient    ..................................................               ................................................  Date                                            Time    ..........................................................................................................................................  Reviewed by Signature/Title    ...................................................              ..............................................  Date                                                            Time
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
## 2017-09-03 NOTE — ED NOTES
"67 y/o male presents with sister with c/o fever, UTI and SOB. Patient was seen in ED yesterday - given fluids and Levaquin IV, sent home on oral Levaquin. Patient had positive blood cultures called during the night. Patient reports \"I'm still not feeling better. I was so short of breath I had to sleep in my recliner last night.\" Patient states a nonproductive cough over the last 2 days. Patient reports increased urinary  Frequency with slight blood in his urine yesterday. States he is weak. Patient concerned \"about getting this infection under control. I have a knee replacement on Thursday.\"  "

## 2017-09-03 NOTE — ED NOTES
Report called to 3 center.  Patient's temp is 100.5 and patient is diaphoretic; patient offered no new complaints at this time.  Continues to feel dizzy and weak when standing. IV infusing: Rocephin complete.

## 2017-09-03 NOTE — ED PROVIDER NOTES
History     Chief Complaint   Patient presents with     Fever     was seen yesterday in ED     Shortness of Breath     intermittent SOB     Nasal Congestion     HPI  Dakota Gaston is a 66 year old male with hx of afib (off coumadin last 4 days prepping for TKA scheduled 9/7 with Dr. Bender), CAD with stents, GERD, presents to ED via private car for evaluation of congestion, fever, cough. Sudden onset of sx during the night Friday. States he went to football game, was rained on, developed cough shortly there after. Developed shaking and sob that progressed into yesterday. Was seen here by me with fever of 102.4.  WBC elevated 13, normal lactic acid, his UA was suspicious for UTI. He was given IV fluids and IV Levaquin in the ED and discharged on PO Levaquin and he took 1 dose this morning. Blood cultures were obtained and initial result came back positive this morning. Pt notified to continue with PO Levaquin and return to ED with worsening sx. Pt reports to continued fever, congestion, nausea, fatigue. Denies abdominal pain, no urinary sx.     Allergies   Allergen Reactions     Penicillin G        No current facility-administered medications on file prior to encounter.   Current Outpatient Prescriptions on File Prior to Encounter:  Omega-3 Fatty Acids (FISH OIL PO)    levofloxacin (LEVAQUIN) 500 MG tablet Take 1 tablet (500 mg) by mouth daily for 7 days   metoprolol (TOPROL-XL) 100 MG 24 hr tablet Take 1.5 tablets (150 mg) by mouth daily (Patient taking differently: Take 200 mg by mouth daily )   OMEPRAZOLE PO Take 40 mg by mouth every morning    SIMVASTATIN PO Take 40 mg by mouth At Bedtime.   nitroglycerin (NITROSTAT) 0.4 MG SL tablet Place  under the tongue every 5 minutes as needed.   warfarin (COUMADIN) 5 MG tablet Take 5 mg by mouth daily        Patient Active Problem List   Diagnosis     History of colon polyps     Subtherapeutic international normalized ratio (INR)     Atrial fibrillation (H)      "Atypical chest pain     Renal insufficiency     Elevated brain natriuretic peptide (BNP) level     Influenza B     Urinary tract infection, site not specified       Past Surgical History:   Procedure Laterality Date     angioplasty with stenting[       arthroscopy left knee[      meniscus tear.     ARTHROTOMY SHOULDER, ROTATOR CUFF REPAIR, COMBINED      spur removed, right shoulder.     CARDIAC SURGERY       COLONOSCOPY       COLONOSCOPY  4/5/2013    Procedure: COLONOSCOPY;  colonoscopy with Polypectomy;  Surgeon: Vickey Martinez MD;  Location: [facility]     COLONOSCOPY N/A 8/14/2015    Procedure: COLONOSCOPY;  Surgeon: Vickey Martinez MD;  Location: [facility]     deviated septum repair[       discectomy and fusion[      C3-C6     ENT SURGERY       tube placement in ears[         Social History   Substance Use Topics     Smoking status: Former Smoker     Quit date: 4/1/2003     Smokeless tobacco: Never Used     Alcohol use No      Comment: rona       Most Recent Immunizations   Administered Date(s) Administered     Influenza (High Dose) 3 valent vaccine 02/16/2015     Pneumococcal 23 valent 02/16/2015       BMI: Estimated body mass index is 35.24 kg/(m^2) as calculated from the following:    Height as of this encounter: 1.803 m (5' 11\").    Weight as of this encounter: 114.6 kg (252 lb 10.4 oz).      Review of Systems   Constitutional: Positive for activity change, appetite change, chills, fatigue and fever.   HENT: Positive for congestion and sinus pressure.    Respiratory: Positive for shortness of breath.    Gastrointestinal: Positive for nausea.   Neurological: Positive for weakness.       Physical Exam   BP: 157/86  Heart Rate: 97  Temp: (!) 102.3  F (39.1  C)  Resp: 20  SpO2: 95 %  Physical Exam   Constitutional: He is oriented to person, place, and time. No distress.   HENT:   Head: Normocephalic and atraumatic.   Right Ear: Tympanic membrane is scarred. Tympanic membrane is not erythematous.   Left Ear: " Tympanic membrane is injected and scarred. Tympanic membrane is not erythematous.   Nose: Mucosal edema and rhinorrhea present. Right sinus exhibits maxillary sinus tenderness. Left sinus exhibits maxillary sinus tenderness.   Mouth/Throat: Uvula is midline, oropharynx is clear and moist and mucous membranes are normal.   Eyes: Conjunctivae are normal.   Neck: Neck supple.   Cardiovascular: Normal rate.  An irregularly irregular rhythm present.   Pulmonary/Chest: Effort normal. He has rhonchi in the right middle field.   Abdominal: Soft. Bowel sounds are normal. He exhibits no distension. There is no tenderness.   Musculoskeletal: Normal range of motion.   Neurological: He is alert and oriented to person, place, and time.   Skin: He is not diaphoretic.   Nursing note and vitals reviewed.      ED Course     Procedures          Labs Ordered and Resulted from Time of ED Arrival Up to the Time of Departure from the ED   CBC WITH PLATELETS DIFFERENTIAL - Abnormal; Notable for the following:        Result Value    RBC Count 4.09 (*)     Hematocrit 38.1 (*)     Absolute Neutrophil 8.9 (*)     Absolute Lymphocytes 0.4 (*)     All other components within normal limits   COMPREHENSIVE METABOLIC PANEL - Abnormal; Notable for the following:     Glucose 188 (*)     Bilirubin Total 1.4 (*)     Albumin 3.2 (*)     Protein Total 6.7 (*)     Alkaline Phosphatase 32 (*)     All other components within normal limits   PROSTATE SPEC ANTIGEN SCREEN - Abnormal; Notable for the following:     PSA 8.91 (*)     All other components within normal limits   INR - Abnormal; Notable for the following:     INR 1.40 (*)     All other components within normal limits   LACTIC ACID   TROPONIN I   D-DIMER (FV RANGE)     SINUS CT    FINDINGS:  There is a solitary opacified air cell in the right ethmoid  posteriorly.  The remainder of the paranasal sinuses are clear.  The  nasal septum is midline.  Turbinates appear intact.  Bony orbits are  intact.   The nasal bones appear normal.    IMPRESSION:  OPACIFIED RIGHT ETHMOID AIR CELL POSTERIORLY.  Exam Date: Sep 03, 2017 01:49:00 PM  Author: KATYA WATKINS  This report is final and signed                 XR Chest 2 Views (Final result) Result time: 09/03/17 16:47:25     Final result by Carter Pang MD (09/03/17 16:47:25)     Narrative:         CHEST FRONTAL AND LATERAL VIEWS    HISTORY:  Cough.    COMPARISON:  Today's study is compared to a prior examination which is  dated September 2, 2017.    FINDINGS:  Frontal and lateral views of the chest were obtained.  The  cardiac silhouette is normal in size.  The pulmonary vasculature is  normal and the lungs are clear.    IMPRESSION:  NO ACUTE CARDIOPULMONARY DISEASE.  Exam Date: Sep 03, 2017 12:38:00 AM  Author: CARTER PANG         Assessments & Plan (with Medical D ecision Making)   Pt returns with continued fever, cough, nasal congestion, positive blood culture. He was seen yesterday with similar sx, given IV Levaquin in the ED, sent home on PO, took 1 dose. Temp on arrival 102. 3 (t), nasal congestion, mild rhonchi. Labs show improved wbc, upward trending of CRP.   Given sinus congestion, ct obtained, results showing right ethmoid opacification. CXR clear. UA from yesterday positive for enterococcus with sensitivity pending.   IV fluids initiated.   Consulted with Dr. Paula who recommended hospitalization for IV fluids, antibiotics  given positive blood culture. Pt verbalizes understanding and agrees with plan.  Epic discharge instructions given. Pt discharged in stable condition.   Pt transferred to floor in stable condition via cart.   I have reviewed the nursing notes.    I have reviewed the findings, diagnosis, plan and need for follow up with the patient.    Current Discharge Medication List        Final diagnoses:   Positive blood culture   Urinary tract infection with hematuria, site unspecified   Congestion of paranasal sinus       9/3/2017   [facility]     Aleksandra Luis APRN FNP  09/05/17 1058

## 2017-09-03 NOTE — PLAN OF CARE
Face to face report given with opportunity to observe patient.    Report given to Michael RN and Jailene Ely RN  9/3/2017  3:42 PM

## 2017-09-03 NOTE — IP AVS SNAPSHOT
MRN:[id number]                      After Visit Summary   9/3/2017    Dakota Gaston    MRN: [id number]           Thank you!     Thank you for choosing [facility] for your care. Our goal is always to provide you with excellent care. Hearing back from our patients is one way we can continue to improve our services. Please take a few minutes to complete the written survey that you may receive in the mail after you visit with us. Thank you!        Patient Information     Date Of Birth          1950        Designated Caregiver       Most Recent Value    Caregiver    Will someone help with your care after discharge? yes    Name of designated caregiver ana maria [daughter]    Phone number of caregiver     Caregiver address [place]      About your hospital stay     You were admitted on:  September 3, 2017 You last received care in the:  [facility]    You were discharged on:  September 8, 2017       Who to Call     For medical emergencies, please call 911.  For non-urgent questions about your medical care, please call your primary care provider or clinic, [phone]          Attending Provider     Provider Specialty    Aleksandra Luis APRN FNP Nurse Practitioner - Family    Willow Gray MD Family Practice       Primary Care Provider Office Phone # Fax #    Willow Gray [phone]      Further instructions from your care team       What to expect when you have contrast    During your exam, we will inject  contrast  into your vein or artery. (Contrast is a clear liquid with iodine in it. It shows up on X-rays.)    You may feel warm or hot. You may have a metal taste in your mouth and a slight upset stomach. You may also feel pressure near the kidneys and bladder. These effects will last about 1 to 3 minutes.    Please tell us if you have:    Sneezing     Itching    Hives     Swelling in the face    A hoarse voice    Breathing problems    Other new  symptoms    Serious problems are rare.  They may include:    Irregular heartbeat     Seizures    Kidney failure              Tissue damage    Shock      Death    If you have any problems during the exam, we  will treat them right away.    When you get home    Call your hospital if you have any new symptoms in the next 2 days, like hives or swelling. (Phone numbers are at the bottom of this page.) Or call your family doctor.     If you have wheezing or trouble breathing, call 911.    Self-care  -Drink at least 4 extra glasses of water today.   This reduces the stress on your kidneys.  -Keep taking your regular medicines.    The contrast will pass out of your body in your  Urine(pee). This will happen in the next 24 hours. You  will not feel this. Your urine will not  change color.    If you have kidney problems or take metformin    Drink 4 to 8 large glasses of water for the next  2 days, if you are not on a fluid restriction.    ?If you take metformin (Glucophage or Glucovance) for diabetes, keep taking it.      ?Your kidney function tests are abnormal.  If you take Metformin, do not take it for 48 hours. Please go to your clinic for a blood test within 3 days after your exam before the restarting this medicine.     (Note to provider:please give patient prescription for lab tests.)    ?Special instructions: Drink an extra 4 glasses of water to flush out the contrast.     I have read and understand the above information.    Patient Sign Here:______________________________________Date:________Time:______    Staff Sign Here:________________________________________Date:_______Time:______      Radiology Departments:     ?[facility]: [phone] ?[facility]: [phone]     ?[place]: [phone] ?[facility]:[phone]      ?[facility]: [phone]  ?[facility]: [phone]  ?[facility]:[phone]    ?[facility]:[phone]  ?[facility]:[phone]  You have a lab appointment on Monday September 11, 2017 at 11:00  "am to recheck your INR at the [facility].       You have a follow up appointment scheduled with Dr. Gray on Monday September 18, 2017 at 9:30am at the [facility]. If you have any questions regarding these appointments please call the clinic at [phone].      Pending Results     Date and Time Order Name Status Description    9/5/2017 0614 Blood culture Preliminary     9/5/2017 0614 Blood culture Preliminary     9/4/2017 0000 CT IMAGING - HIM SCAN Preliminary     9/3/2017 0000 CT IMAGING - HIM SCAN Preliminary     9/2/2017 1454 EKG CARDIAC - HIM SCAN Preliminary             Statement of Approval     Ordered          09/08/17 0754  I have reviewed and agree with all the recommendations and orders detailed in this document.  EFFECTIVE NOW     Comments:  F/u w/ me wk of sept 18th, will need UA at that visit   Approved and electronically signed by:  Willow Gray MD             Admission Information     Date & Time Provider Department Dept. Phone    9/3/2017 Willow Gray MD [facility] [phone]      Your Vitals Were     Blood Pressure Temperature Respirations Height Weight Pulse Oximetry    145/85 (BP Location: Right arm) 97.6  F (36.4  C) (Tympanic) 18 1.803 m (5' 11\") 114.2 kg (251 lb 12.3 oz) 94%    BMI (Body Mass Index)                   35.11 kg/m2           [facility] Information     [employer] lets you send messages to your doctor, view your test results, renew your prescriptions, schedule appointments and more. To sign up, go to [email or web address] . Click on \"Log in\" on the left side of the screen, which will take you to the Welcome page. Then click on \"Sign up Now\" on the right side of the page.     You will be asked to enter the access code listed below, as well as some personal information. Please follow the directions to create your username and password.     Your access code is: [email or web address]  Expires: 12/1/2017  6:29 PM     Your " access code will  in 90 days. If you need help or a new code, please call your [facility] clinic or [phone].        Care EveryWhere ID     This is your Care EveryWhere ID. This could be used by other organizations to access your [facility] medical records  [id number]        Equal Access to Services     FRANCESJAVIER JULIO : Hadii aad ku hadasho Soomaali, waaxda luqadaha, qaybta kaalmada adeegyada, waxleslie trupti leroyn shadiajake odonnell aide shah. So [facility] [phone].    ATENCIÓN: Si habla español, tiene a tomlin disposición servicios gratuitos de asistencia lingüística. Llame al [phone].    We comply with applicable federal civil rights laws and [place] laws. We do not discriminate on the basis of race, color, national origin, age, disability sex, sexual orientation or gender identity.               Review of your medicines      START taking        Dose / Directions    nitroFURantoin (macrocrystal-monohydrate) 100 MG capsule   Commonly known as:  MACROBID   Used for:  Urinary tract infection with hematuria, site unspecified        Dose:  100 mg   Take 1 capsule (100 mg) by mouth 2 times daily for 10 days   Quantity:  20 capsule   Refills:  0       saccharomyces boulardii 250 MG capsule   Commonly known as:  FLORASTOR   Used for:  Urinary tract infection with hematuria, site unspecified        Dose:  250 mg   Take 1 capsule (250 mg) by mouth 2 times daily   Quantity:  60 capsule   Refills:  0         CONTINUE these medicines which may have CHANGED, or have new prescriptions. If we are uncertain of the size of tablets/capsules you have at home, strength may be listed as something that might have changed.        Dose / Directions    metoprolol 100 MG 24 hr tablet   Commonly known as:  TOPROL-XL   This may have changed:  how much to take   Used for:  Atrial fibrillation with RVR (H)        Dose:  150 mg   Take 1.5 tablets (150 mg) by mouth daily   Quantity:  135 tablet   Refills:  3         CONTINUE these medicines which  have NOT CHANGED        Dose / Directions    COUMADIN 5 MG tablet   Generic drug:  warfarin        Take 5 mg by mouth Tues, Thurs, Sat and 2.5 mg by mouth the rest of the week.   Refills:  0       FISH OIL PO        Refills:  0       nitroGLYcerin 0.4 MG sublingual tablet   Commonly known as:  NITROSTAT        Place  under the tongue every 5 minutes as needed.   Refills:  0       OMEPRAZOLE PO        Dose:  40 mg   Take 40 mg by mouth every morning   Refills:  0       SIMVASTATIN PO        Dose:  40 mg   Take 40 mg by mouth At Bedtime.   Refills:  0         STOP taking     levofloxacin 500 MG tablet   Commonly known as:  LEVAQUIN                Where to get your medicines      These medications were sent to [id number] [place]  [address]   [id number] , [place] [address]     Phone:  [phone]     nitroFURantoin (macrocrystal-monohydrate) 100 MG capsule    saccharomyces boulardii 250 MG capsule                Protect others around you: Learn how to safely use, store and throw away your medicines at www.disposemymeds.org.             Medication List: This is a list of all your medications and when to take them. Check marks below indicate your daily home schedule. Keep this list as a reference.      Medications           Morning Afternoon Evening Bedtime As Needed    COUMADIN 5 MG tablet   Take 5 mg by mouth Tues, Thurs, Sat and 2.5 mg by mouth the rest of the week.   Last time this was given:  10 mg on 9/7/2017  5:59 PM   Generic drug:  warfarin                                FISH OIL PO                                metoprolol 100 MG 24 hr tablet   Commonly known as:  TOPROL-XL   Take 1.5 tablets (150 mg) by mouth daily   Last time this was given:  200 mg on 9/8/2017  8:57 AM                                nitroFURantoin (macrocrystal-monohydrate) 100 MG capsule   Commonly known as:  MACROBID   Take 1 capsule (100 mg) by mouth 2 times daily for 10 days                                 nitroGLYcerin 0.4 MG sublingual tablet   Commonly known as:  NITROSTAT   Place  under the tongue every 5 minutes as needed.                                OMEPRAZOLE PO   Take 40 mg by mouth every morning   Last time this was given:  40 mg on 9/8/2017  7:08 AM                                saccharomyces boulardii 250 MG capsule   Commonly known as:  FLORASTOR   Take 1 capsule (250 mg) by mouth 2 times daily   Last time this was given:  250 mg on 9/8/2017  8:57 AM                                SIMVASTATIN PO   Take 40 mg by mouth At Bedtime.   Last time this was given:  40 mg on 9/7/2017  9:18 PM                                          More Information        Patient Education    Hydrochlorothiazide Oral capsule    Hydrochlorothiazide Oral tablet  Hydrochlorothiazide Oral tablet  What is this medicine?  HYDROCHLOROTHIAZIDE (sherley droe klor oh THYE a zide) is a diuretic. It increases the amount of urine passed, which causes the body to lose salt and water. This medicine is used to treat high blood pressure. It is also reduces the swelling and water retention caused by various medical conditions, such as heart, liver, or kidney disease.  This medicine may be used for other purposes; ask your health care provider or pharmacist if you have questions.  What should I tell my health care provider before I take this medicine?  They need to know if you have any of these conditions:    diabetes    gout    immune system problems, like lupus    kidney disease or kidney stones    liver disease    pancreatitis    small amount of urine or difficulty passing urine    an unusual or allergic reaction to hydrochlorothiazide, sulfa drugs, other medicines, foods, dyes, or preservatives    pregnant or trying to get pregnant    breast-feeding  How should I use this medicine?  Take this medicine by mouth with a glass of water. Follow the directions on the prescription label. Take your medicine at regular intervals. Remember that you will  need to pass urine frequently after taking this medicine. Do not take your doses at a time of day that will cause you problems. Do not stop taking your medicine unless your doctor tells you to.  Talk to your pediatrician regarding the use of this medicine in children. Special care may be needed.  Overdosage: If you think you have taken too much of this medicine contact a poison control center or emergency room at once.  NOTE: This medicine is only for you. Do not share this medicine with others.  What if I miss a dose?  If you miss a dose, take it as soon as you can. If it is almost time for your next dose, take only that dose. Do not take double or extra doses.  What may interact with this medicine?    cholestyramine    colestipol    digoxin    dofetilide    lithium    medicines for blood pressure    medicines for diabetes    medicines that relax muscles for surgery    other diuretics    steroid medicines like prednisone or cortisone  This list may not describe all possible interactions. Give your health care provider a list of all the medicines, herbs, non-prescription drugs, or dietary supplements you use. Also tell them if you smoke, drink alcohol, or use illegal drugs. Some items may interact with your medicine.  What should I watch for while using this medicine?  Visit your doctor or health care professional for regular checks on your progress. Check your blood pressure as directed. Ask your doctor or health care professional what your blood pressure should be and when you should contact him or her.  You may need to be on a special diet while taking this medicine. Ask your doctor.  Check with your doctor or health care professional if you get an attack of severe diarrhea, nausea and vomiting, or if you sweat a lot. The loss of too much body fluid can make it dangerous for you to take this medicine.  You may get drowsy or dizzy. Do not drive, use machinery, or do anything that needs mental alertness until you  know how this medicine affects you. Do not stand or sit up quickly, especially if you are an older patient. This reduces the risk of dizzy or fainting spells. Alcohol may interfere with the effect of this medicine. Avoid alcoholic drinks.  This medicine may affect your blood sugar level. If you have diabetes, check with your doctor or health care professional before changing the dose of your diabetic medicine.  This medicine can make you more sensitive to the sun. Keep out of the sun. If you cannot avoid being in the sun, wear protective clothing and use sunscreen. Do not use sun lamps or tanning beds/booths.  What side effects may I notice from receiving this medicine?  Side effects that you should report to your doctor or health care professional as soon as possible:    allergic reactions such as skin rash or itching, hives, swelling of the lips, mouth, tongue, or throat    changes in vision    chest pain    eye pain    fast or irregular heartbeat    feeling faint or lightheaded, falls    gout attack    muscle pain or cramps    pain or difficulty when passing urine    pain, tingling, numbness in the hands or feet    redness, blistering, peeling or loosening of the skin, including inside the mouth    unusually weak or tired  Side effects that usually do not require medical attention (report to your doctor or health care professional if they continue or are bothersome):    change in sex drive or performance    dry mouth    headache    stomach upset  This list may not describe all possible side effects. Call your doctor for medical advice about side effects. You may report side effects to FDA at 8-145-FDA-4730.  Where should I keep my medicine?  Keep out of the reach of children.  Store at room temperature between 15 and 30 degrees C (59 and 86 degrees F). Do not freeze. Protect from light and moisture. Keep container closed tightly. Throw away any unused medicine after the expiration date.  NOTE:This sheet is a  summary. It may not cover all possible information. If you have questions about this medicine, talk to your doctor, pharmacist, or health care provider. Copyright  2016 Gold Standard                Saccharomyces boulardii (Florastor) oral dosage forms  What is this medicine?  SACCHAROMYCES boulardii (MARC CORDON sees trinidad LAR belén eye) is a supplement. It is used to help the normal balance of bacteria in the colon. The FDA has not approved this supplement for any medical use.  How should I use this medicine?  Take this medicine by mouth. Follow the directions on the package labeling, or take as directed by your health care professional. Do not take this medicine more often than directed.  Capsules: Swallow capsules whole or mix contents of capsule with water, apple juice or other non-carbonated drinks. Capsule contents can also be added to apple sauce and other soft foods. Do not add to hot beverages.  Granules: Mix contents of packet with water, apple juice, or other non-carbonated drinks. For babies, may add to formula after warmed. Can also be added to apple sauce and other soft baby foods. Do not add to hot beverages.  Chewable tablets: Chew tablet completely before swallowing. Can be crushed and sprinkled on tongue. Can also be added to water, apple juice, or other non-carbonated drinks or apple sauce or other soft foods. Do not add to hot beverages.  Contact your pediatrician regarding the use of this medicine in children. Special care may be needed. This medicine is not recommended for children or infants unless prescribed by a doctor.  What side effects may I notice from receiving this medicine?  Side effects that you should report to your doctor or health care professional as soon as possible:    allergic reactions like skin rash, itching or hives, swelling of the face, lips, or tongue    breathing problems    fever    nausea, vomiting    unusually weak or tired  Side effects that usually do not require  medical attention (report to your doctor or health care professional if they continue or are bothersome):    constipation    increased thirst    mild gas  What may interact with this medicine?    medications for fungal infections, such as fluconazole, itraconazole, ketoconazole, nystatin, or voriconazole.  What if I miss a dose?  If you miss a dose, take it as soon as you can. If it is almost time for your next dose, take only that dose. Do not take double or extra doses.  Where should I keep my medicine?  Keep out of the reach of children.  Store at room temperature under 25 degrees C (77 degrees F). Keep granule packets or capsules closed until time of use. Throw away any unused medicine after the expiration date.  What should I tell my health care provider before I take this medicine?  They need to know if you have any of these conditions:    chronic disease    have a central venous catheter    immune system problems    an unusual or allergic reaction to Saccharomyces boulardii, Lombardi's yeast or other yeast, lactose or milk (Florastor brand contains lactose), other foods, dyes, or preservatives    pregnant or trying to get pregnant    breast-feeding  What should I watch for while using this medicine?  See your doctor if your symptoms do not get better or if they get worse.  If you have allergies to milk or you are sensitive to lactose, do not use the Florastor brand supplement. Florastor contains lactose.  NOTE:This sheet is a summary. It may not cover all possible information. If you have questions about this medicine, talk to your doctor, pharmacist, or health care provider. Copyright  2017 Gold Standard                Patient Education    Nitrofurantoin Oral suspension    Nitrofurantoin Oral tablet    Nitrofurantoin, Macrocrystalline Oral capsule    Nitrofurantoin, Nitrofurantoin, Macrocrystalline Oral capsule  Nitrofurantoin, Nitrofurantoin, Macrocrystalline Oral capsule  What is this  medicine?  NITROFURANTOIN (tom herron) is an antibiotic. It is used to treat urinary tract infections.  This medicine may be used for other purposes; ask your health care provider or pharmacist if you have questions.  What should I tell my health care provider before I take this medicine?  They need to know if you have any of these conditions:    anemia    diabetes    glucose-6-phosphate dehydrogenase deficiency    kidney disease    liver disease    lung disease    other chronic illness    an unusual or allergic reaction to nitrofurantoin, other antibiotics, other medicines, foods, dyes or preservatives    pregnant or trying to get pregnant    breast-feeding  How should I use this medicine?  Take this medicine by mouth with a glass of water. Follow the directions on the prescription label. Take this medicine with food or milk. Take your doses at regular intervals. Do not take your medicine more often than directed. Do not stop taking except on your doctor's advice.  Talk to your pediatrician regarding the use of this medicine in children. While this drug may be prescribed for selected conditions, precautions do apply.  Overdosage: If you think you have taken too much of this medicine contact a poison control center or emergency room at once.  NOTE: This medicine is only for you. Do not share this medicine with others.  What if I miss a dose?  If you miss a dose, take it as soon as you can. If it is almost time for your next dose, take only that dose. Do not take double or extra doses.  What may interact with this medicine?    antacids containing magnesium trisilicate    probenecid    quinolone antibiotics like ciprofloxacin, lomefloxacin, norfloxacin and ofloxacin    sulfinpyrazone  This list may not describe all possible interactions. Give your health care provider a list of all the medicines, herbs, non-prescription drugs, or dietary supplements you use. Also tell them if you smoke, drink alcohol, or  use illegal drugs. Some items may interact with your medicine.  What should I watch for while using this medicine?  Tell your doctor or health care professional if your symptoms do not improve or if you get new symptoms. Drink several glasses of water a day. If you are taking this medicine for a long time, visit your doctor for regular checks on your progress.  If you are diabetic, you may get a false positive result for sugar in your urine with certain brands of urine tests. Check with your doctor.  What side effects may I notice from receiving this medicine?  Side effects that you should report to your doctor or health care professional as soon as possible:    allergic reactions like skin rash or hives, swelling of the face, lips, or tongue    chest pain    cough    difficulty breathing    dizziness, drowsiness    fever or infection    joint aches or pains    pale or blue-tinted skin    redness, blistering, peeling or loosening of the skin, including inside the mouth    tingling, burning, pain, or numbness in hands or feet    unusual bleeding or bruising    unusually weak or tired    yellowing of eyes or skin  Side effects that usually do not require medical attention (report to your doctor or health care professional if they continue or are bothersome):    dark urine    diarrhea    headache    loss of appetite    nausea or vomiting    temporary hair loss  This list may not describe all possible side effects. Call your doctor for medical advice about side effects. You may report side effects to FDA at 0-966-FDA-0464.  Where should I keep my medicine?  Keep out of the reach of children.  Store at room temperature between 15 and 30 degrees C (59 and 86 degrees F). Protect from light. Throw away any unused medicine after the expiration date.  NOTE:This sheet is a summary. It may not cover all possible information. If you have questions about this medicine, talk to your doctor, pharmacist, or health care provider.  Copyright  2016 Gold Standard                Understanding Sepsis  Sepsis is a severe response the body has to an infection. It is most often caused by bacteria. It is also known as septicemia, or systemic inflammatory response syndrome (SIRS). Sepsis is a medical emergency. It needs to be treated right away.  What is sepsis?  Sepsis is when the body reacts to an infection with a severe inflammatory response. It can be caused by bacteria, fungus, or a virus. Sepsis can cause many kinds of problems around the body. It can lead severe low blood pressure (shock) and organ failure. This can lead to death if not treated.  Sepsis is most common in:    Infants and older adults    People with an infection such as pneumonia, meningitis, or a urinary tract infection    People who have an illness such as cancer, AIDS, or diabetes    People being treated with chemotherapy medications or radiation    People who have had a transplant  Symptoms of sepsis  Symptoms of sepsis can include:    Chills and shaking    Rapid heartbeat    Rapid breathing    Shortness of breath    Severe nausea or uncontrolled vomiting    Confusion    Dizziness    Decreased urination    Severe pain, including in the back or joints   Diagnosing sepsis  If your health care provider thinks you may have sepsis, you will be given tests. You may have blood and urine tests. These are done to look for bacteria, viruses, or fungus. You may also have X-rays or other imaging tests. These may be done to look at your organs to locate the source of infection.  Treating sepsis  If you have sepsis, your health care provider will give you antibiotics through a thin, flexible tube put into a vein in your arm (IV). You will also be given fluids through the IV. You may also be given nutrition or other medications through your IV. Your health care provider will talk with you about other treatments you may need. These may include using an oxygen mask or a ventilator to help with  breathing. Treatment may last at least 7 to 10 days. Sepsis must be treated in the hospital.  Date Last Reviewed: 7/15/2015    [id number] [employer]. [address] [place] [address], [place] [address]. All rights reserved. This information is not intended as a substitute for professional medical care. Always follow your healthcare professional's instructions.                Urinary Tract Infections in Men  Urinary tract infections (UTIs) are most often caused by bacteria (germs) that invade the urinary tract. The bacteria may come from outside the body. Or they may travel from the skin outside of rectum into the urethra. Pain in or around the urinary tract is a common symptom for most UTIs. But the only way to know for sure if you have a UTI is to have a urinalysis and urine culture.     Four Types of UTIs    Cystitis: A bladder infection, or cystitis, is often linked to a blockage from an enlarged prostate. You may have an urgent or frequent need to urinate, and bloody urine. Treatment includes antibiotics and medications to relax or shrink the prostate. In some cases, surgery is needed.    Urethritis: This is an infection of the urethra. You may have a discharge from the urethra or burning when you urinate.You may also have pain in the urethra or penis. Urethritis is treated with antibiotics.    Prostatitis: This is an inflammation or infection of the prostate. You may have an urgent or frequent need to urinate, fever, or burning when you urinate. Or you may have a tender prostate, or a vague feeling of pressure. Prostatitis is treated with a range of medications, depending on the cause.    Pyelonephritis: This is a kidney infection. If not treated, it can be serious and damage your kidneys. In severe cases you may be hospitalized. You may have a fever and upper back pain.  Treating a UTI    Medications: Most UTIs are treated with antibiotics. These kill the bacteria. The length of time you need to take them  depends on the type of infection. Take antibiotics exactly as directed until all of the medication is gone. If you do not, the infection may not go away and may become harder to treat. For certain types of UTIs, you may be given other medications to help treat your symptoms.    Lifestyle changes: The lifestyle changes below will help get rid of your current infection. They may also help prevent future UTIs.    Drink plenty of fluids such as water, juice, or other caffeine-free drinks. This helps flush bacteria out of your system.    Empty your bladder when you feel the urge to urinate and before going to sleep. Urine that stays in your bladder promotes infection.    Use condoms during sex. These help prevent UTIs caused by sexually transmitted bacteria.    Keep follow-up appointments with your health care provider. He or she can may do tests to make sure the infection has cleared. If necessary, additional treatment can be started.    Additional treatment: Most UTIs respond to medication. But sometimes a procedure or surgery is needed. This can treat an enlarged prostate, or remove a kidney stone or other blockage. Surgery may also treat problems caused by scarring or long-term infections.    [id number] [employer]. [address] [place] [address], [place] [address]. All rights reserved. This information is not intended as a substitute for professional medical care. Always follow your healthcare professional's instructions.

## 2017-09-03 NOTE — PROGRESS NOTES
DATE:  9/3/2017   TIME OF RECEIPT FROM LAB:  0646 reported to Kellie Ramirez RN  LAB TEST:  Preliminary Blood Culture  LAB VALUE:  1 of 2 bottles gram positive cocci  RESULTS GIVEN WITH READ-BACK TO (PROVIDER):  Dr. EUGENIO Ingram  TIME LAB VALUE REPORTED TO PROVIDER:   0715    Pt was seen in ED on 9/2, received IV Levaquin and discharged on Levaquin 500 mg daily x 7 days.  Report reviewed by Dr. Ingram, wait for ID and susceptibility testing results.  Follow up phone call to patient.  Pt reports still not feeling well, not shaky but feet are chilled and had to sleep in recliner last night.  Has not checked temp.  AMADOR Luis NP updated.

## 2017-09-04 LAB
ALBUMIN SERPL-MCNC: 3 G/DL (ref 3.4–5)
ALP SERPL-CCNC: 27 U/L (ref 40–150)
ALT SERPL W P-5'-P-CCNC: 22 U/L (ref 0–70)
ANION GAP SERPL CALCULATED.3IONS-SCNC: 7 MMOL/L (ref 3–14)
AST SERPL W P-5'-P-CCNC: 15 U/L (ref 0–45)
BACTERIA SPEC CULT: NORMAL
BASOPHILS # BLD AUTO: 0.1 10E9/L (ref 0–0.2)
BASOPHILS NFR BLD AUTO: 0.7 %
BILIRUB DIRECT SERPL-MCNC: 0.3 MG/DL (ref 0–0.2)
BILIRUB SERPL-MCNC: 1 MG/DL (ref 0.2–1.3)
BUN SERPL-MCNC: 12 MG/DL (ref 7–30)
CALCIUM SERPL-MCNC: 8.5 MG/DL (ref 8.5–10.1)
CHLORIDE SERPL-SCNC: 108 MMOL/L (ref 94–109)
CO2 SERPL-SCNC: 23 MMOL/L (ref 20–32)
CREAT SERPL-MCNC: 1.08 MG/DL (ref 0.66–1.25)
CRP SERPL-MCNC: 147 MG/L (ref 0–8)
DIFFERENTIAL METHOD BLD: ABNORMAL
EOSINOPHIL # BLD AUTO: 0 10E9/L (ref 0–0.7)
EOSINOPHIL NFR BLD AUTO: 0.6 %
ERYTHROCYTE [DISTWIDTH] IN BLOOD BY AUTOMATED COUNT: 12.8 % (ref 10–15)
GFR SERPL CREATININE-BSD FRML MDRD: 68 ML/MIN/1.7M2
GLUCOSE SERPL-MCNC: 108 MG/DL (ref 70–99)
HCT VFR BLD AUTO: 35.5 % (ref 40–53)
HGB BLD-MCNC: 12.3 G/DL (ref 13.3–17.7)
IMM GRANULOCYTES # BLD: 0 10E9/L (ref 0–0.4)
IMM GRANULOCYTES NFR BLD: 0.4 %
LACTATE SERPL-SCNC: 1.5 MMOL/L (ref 0.4–2)
LYMPHOCYTES # BLD AUTO: 0.8 10E9/L (ref 0.8–5.3)
LYMPHOCYTES NFR BLD AUTO: 11.1 %
MCH RBC QN AUTO: 32.6 PG (ref 26.5–33)
MCHC RBC AUTO-ENTMCNC: 34.6 G/DL (ref 31.5–36.5)
MCV RBC AUTO: 94 FL (ref 78–100)
MONOCYTES # BLD AUTO: 0.6 10E9/L (ref 0–1.3)
MONOCYTES NFR BLD AUTO: 8.2 %
NEUTROPHILS # BLD AUTO: 5.6 10E9/L (ref 1.6–8.3)
NEUTROPHILS NFR BLD AUTO: 79 %
NRBC # BLD AUTO: 0 10*3/UL
NRBC BLD AUTO-RTO: 0 /100
PLATELET # BLD AUTO: 145 10E9/L (ref 150–450)
POTASSIUM SERPL-SCNC: 3.8 MMOL/L (ref 3.4–5.3)
PROT SERPL-MCNC: 6.5 G/DL (ref 6.8–8.8)
RBC # BLD AUTO: 3.77 10E12/L (ref 4.4–5.9)
SODIUM SERPL-SCNC: 138 MMOL/L (ref 133–144)
SPECIMEN SOURCE: NORMAL
WBC # BLD AUTO: 7.1 10E9/L (ref 4–11)

## 2017-09-04 PROCEDURE — 80076 HEPATIC FUNCTION PANEL: CPT | Performed by: FAMILY MEDICINE

## 2017-09-04 PROCEDURE — 36415 COLL VENOUS BLD VENIPUNCTURE: CPT | Performed by: FAMILY MEDICINE

## 2017-09-04 PROCEDURE — 25000132 ZZH RX MED GY IP 250 OP 250 PS 637: Mod: GY | Performed by: FAMILY MEDICINE

## 2017-09-04 PROCEDURE — 86140 C-REACTIVE PROTEIN: CPT | Performed by: FAMILY MEDICINE

## 2017-09-04 PROCEDURE — G0378 HOSPITAL OBSERVATION PER HR: HCPCS

## 2017-09-04 PROCEDURE — 96366 THER/PROPH/DIAG IV INF ADDON: CPT

## 2017-09-04 PROCEDURE — 85025 COMPLETE CBC W/AUTO DIFF WBC: CPT | Performed by: FAMILY MEDICINE

## 2017-09-04 PROCEDURE — 96367 TX/PROPH/DG ADDL SEQ IV INF: CPT

## 2017-09-04 PROCEDURE — 12000000 ZZH R&B MED SURG/OB

## 2017-09-04 PROCEDURE — A9270 NON-COVERED ITEM OR SERVICE: HCPCS | Mod: GY | Performed by: FAMILY MEDICINE

## 2017-09-04 PROCEDURE — 74177 CT ABD & PELVIS W/CONTRAST: CPT | Mod: TC

## 2017-09-04 PROCEDURE — 83605 ASSAY OF LACTIC ACID: CPT | Performed by: FAMILY MEDICINE

## 2017-09-04 PROCEDURE — 25000128 H RX IP 250 OP 636

## 2017-09-04 PROCEDURE — 25000128 H RX IP 250 OP 636: Performed by: FAMILY MEDICINE

## 2017-09-04 PROCEDURE — 80048 BASIC METABOLIC PNL TOTAL CA: CPT | Performed by: FAMILY MEDICINE

## 2017-09-04 RX ORDER — HYDROCHLOROTHIAZIDE 25 MG/1
25 TABLET ORAL DAILY
Status: DISCONTINUED | OUTPATIENT
Start: 2017-09-04 | End: 2017-09-08 | Stop reason: HOSPADM

## 2017-09-04 RX ORDER — IOPAMIDOL 612 MG/ML
100 INJECTION, SOLUTION INTRAVASCULAR ONCE
Status: COMPLETED | OUTPATIENT
Start: 2017-09-04 | End: 2017-09-04

## 2017-09-04 RX ADMIN — VANCOMYCIN HYDROCHLORIDE 1750 MG: 1 INJECTION, POWDER, LYOPHILIZED, FOR SOLUTION INTRAVENOUS at 06:04

## 2017-09-04 RX ADMIN — IBUPROFEN 200 MG: 200 TABLET, FILM COATED ORAL at 10:50

## 2017-09-04 RX ADMIN — IBUPROFEN 200 MG: 200 TABLET, FILM COATED ORAL at 17:51

## 2017-09-04 RX ADMIN — ENOXAPARIN SODIUM 40 MG: 40 INJECTION SUBCUTANEOUS at 17:52

## 2017-09-04 RX ADMIN — METOPROLOL SUCCINATE 200 MG: 100 TABLET, EXTENDED RELEASE ORAL at 08:21

## 2017-09-04 RX ADMIN — Medication 250 MG: at 20:55

## 2017-09-04 RX ADMIN — LEVOFLOXACIN 750 MG: 5 INJECTION, SOLUTION INTRAVENOUS at 16:49

## 2017-09-04 RX ADMIN — Medication 250 MG: at 08:22

## 2017-09-04 RX ADMIN — ACETAMINOPHEN 650 MG: 325 TABLET, FILM COATED ORAL at 16:16

## 2017-09-04 RX ADMIN — VANCOMYCIN HYDROCHLORIDE 1750 MG: 1 INJECTION, POWDER, LYOPHILIZED, FOR SOLUTION INTRAVENOUS at 19:25

## 2017-09-04 RX ADMIN — IOPAMIDOL 100 ML: 612 INJECTION, SOLUTION INTRAVASCULAR at 18:59

## 2017-09-04 RX ADMIN — SODIUM CHLORIDE 1000 ML: 9 INJECTION, SOLUTION INTRAVENOUS at 22:13

## 2017-09-04 RX ADMIN — ACETAMINOPHEN 650 MG: 325 TABLET, FILM COATED ORAL at 01:03

## 2017-09-04 RX ADMIN — HYDROCHLOROTHIAZIDE 25 MG: 25 TABLET ORAL at 10:50

## 2017-09-04 RX ADMIN — OMEPRAZOLE 40 MG: 20 CAPSULE, DELAYED RELEASE ORAL at 06:04

## 2017-09-04 RX ADMIN — SODIUM CHLORIDE 1000 ML: 9 INJECTION, SOLUTION INTRAVENOUS at 11:19

## 2017-09-04 RX ADMIN — ACETAMINOPHEN 650 MG: 325 TABLET, FILM COATED ORAL at 08:21

## 2017-09-04 RX ADMIN — SIMVASTATIN 40 MG: 40 TABLET, FILM COATED ORAL at 20:55

## 2017-09-04 NOTE — PLAN OF CARE
Problem: Goal Outcome Summary  Goal: Goal Outcome Summary  Outcome: No Change  Patient A&O, steady on feet, up independently in room. Highest temp 102.1 this shift, received PRN Tylenol once and PRN Ibuprofen once. Also used Ice packs and cool washcloth to bring temp down. Continues on IV antibiotics. Started on Hydrochlorothiazide 25 mg today. IVF running at 125 ml/hr. Lactic Acid 1.5. Voiding frequency and urgency continues. Poor appetite this morning, better at lunch. Passing flatus, no BM. Lungs diminished. Pulse irregular, hx a-fib. Call light within reach, makes needs known.      Face to face report given with opportunity to observe patient.     Report given to Michael Em   9/4/2017  3:15 PM

## 2017-09-04 NOTE — PLAN OF CARE
Problem: Patient Goal: Social Work Focus  Goal: 1. Patient Goal: Social Work Focus  Assessment completed see flow sheet.     Melo is sitting in bed, is very pleasant and willing to participate in this assessment.  His PCP is Dr Elly Guevara, his dentist is Dr Hassan and he goes to the [facility]. He does not have a POA or a healthcare directive, information was provided. He uses [facility]. He is not a .    Melo lives at home alone. He states he feels safe and that his home is well maintained. He performs his own self cares, manages his medication and appointment schedule. He has an old CPAP at home that he can no longer get replacement parts for so he will be speaking to Dr Elly Guevara to get a new prescription. He does his own shopping and food prep. He drives and has reliable transportation. He is retired from [employer].     He does have difficulty sleeping at night without a functioning CPAP, spends 50% of his night in a recliner. He has no mood concerns. He quit smoking in 2003 and does not consume alcohol. His rosy is somewhat important to him, he does not want rosy support at this time. He denies stressors. His support system are his daughter and son and his sister Aury Falcon. He enjoys bowling, watching sports and family time.    Melo plans to discharge to home, his daughter or sister will provide transportation. No needs were identified. Will remain available for discharge planning.    LOC: alert     Others present: Patient     Dx: UTI, sinus infection, + blood cultures      Lives with: Lives With: alone     Living at: Living Arrangements: house     Equipment used:       Support System:       Primary PCP: Willow Gray     POA/Guardian: No     Health Care Directive: NO     Pharmacy: [facility]     :  None     Homecare/County Services:   none      Adequate Resources for needs (housing, utilities, food/med): YES     Meds and appointments  management: YES     Work: NO     Transportation: YES      ADLs: independent     Falls: No     Able to Return to Prior Living Arrangements: YES     Emmett: NO      Goals: to go home     Barriers: none     Needs: Demonstrates Competency     ALONA: None     ED Visits: 1

## 2017-09-04 NOTE — PHARMACY
[facility]    Pharmacy      Antimicrobial Stewardship Note     Current antimicrobial therapy:  Anti-infectives (Future)    Start     Dose/Rate Route Frequency Ordered Stop    09/03/17 1830  vancomycin (VANCOCIN) 1,750 mg in NaCl 0.9 % 500 mL intermittent infusion      1,750 mg  284 mL/hr over 2 Hours Intravenous EVERY 12 HOURS 09/03/17 1658      09/03/17 1700  levofloxacin (LEVAQUIN) infusion 750 mg      750 mg  100 mL/hr over 90 Minutes Intravenous EVERY 24 HOURS 09/03/17 1636            Indication: UTI, plus positive blood cultures     Pertinent labs:  Creatinine   Creatinine   Date Value Ref [facility] Status   09/04/2017 1.08 0.66 - 1.25 mg/dL Final   09/03/2017 1.13 0.66 - 1.25 mg/dL Final   09/02/2017 1.14 0.66 - 1.25 mg/dL Final     WBC   WBC   Date Value Ref Range Status   09/04/2017 7.1 4.0 - 11.0 10e9/L Final   09/03/2017 9.9 4.0 - 11.0 10e9/L Final   09/02/2017 13.0 (H) 4.0 - 11.0 10e9/L Final     ANC No components found for: ANC     Culture Results:   9/2/17  3:24 PM         Component Results      Component Collected Lab     Specimen Description 09/02/2017  3:24 PM HI     Midstream Urine     Culture Micro (Abnormal) 09/02/2017  3:24 PM HI     >100,000 colonies/mL   Enterococcus faecalis        Culture Micro (Abnormal) 09/02/2017  3:24 PM HI     10,000 to 50,000 colonies/mL   Alpha hemolytic Streptococcus   Identification to follow.          Culture & Susceptibility      ENTEROCOCCUS FAECALIS      Antibiotic Interpretation Sensitivity Unit Method Status     AMPICILLIN Sensitive <=2 ug/mL RENATA Preliminary     NITROFURANTOIN Sensitive <=16 ug/mL RENATA Preliminary     PENICILLIN Sensitive 2 ug/mL RENATA Preliminary     VANCOMYCIN Sensitive 1 ug/mL RENATA Preliminary                  9/2/17  3:15 PM         Component Results      Component Collected Lab     Specimen Description 09/02/2017  3:15 PM HI     Blood     Special Requests 09/02/2017  3:15 PM HI     Right Arm     Culture Micro (Abnormal)  09/02/2017  3:15 PM HI     1 of 2 Bottles   Gram positive cocci   Identification and susceptibility to follow.        Culture Micro 09/02/2017  3:15 PM HI     Critical Value:   Gram stain results of anaerobic bottle called to and read back by   Kellie Ramirez at 0646 on 09/03/17 by Cesar Ramirez                   Recommendations/Interventions:  1. None.  Once further cultures of gram positive cocci come back, antibiotics will be able to be reduced.    Stefany Joaquin, [facility]  September 4, 2017

## 2017-09-04 NOTE — PLAN OF CARE
[facility] Observation to Admission Note:    Patient status changed from Observation to Inpatient admission on 9/4/2017 at approximately 11:01 AM. Admission required documentation completed. Will continue to monitor and document as needed.     Inpatient Nursing criteria below was met (In addition to previous Observation criteria listed):      Core Measure diagnosis present:No. If yes, state diagnosis: N/A      Clergy visit ordered if patient requests: N/A      Fall Prevention Low: Care plan updated, education given and             documented, sticker and magnet in place: No-patient A&O, steady on feet, up independently in room.       Care Plan updated to include appropriate Clinical Practice Guideline(s): Yes      Education Documented (including initial assessment): Yes      Patient has discharge needs : No If yes, please explain:N/A

## 2017-09-04 NOTE — PLAN OF CARE
Problem: Goal Outcome Summary  Goal: Goal Outcome Summary  Outcome: No Change  VSS except temp 100.8 tympanic, symptomatic with rigors. Tylenol 650 mg given. Lungs clear. Ambulating independently in room. Urinated twice during shift, clear eduard as charted. Receiving IV levaquin and vanco. IV infusing at 125 ml/hr NS.     Face to face report given with opportunity to observe patient.    Report given to MAIRA Pham   9/3/2017  11:24 PM

## 2017-09-04 NOTE — PROGRESS NOTES
[facility]  Progress Note            Subjective:   The patient had fever and chills overnight. He notes a mild frontal headache. He notes chronic neck and low back pain unchanged from his usual symptoms. He has diminished appetite, but tolerated pos this AM.  He has no other complaints at this time.                 Medications:     Current Facility-Administered Medications Ordered in Epic   Medication Dose Route Frequency Last Rate Last Dose     hydrochlorothiazide (HYDRODIURIL) tablet 25 mg  25 mg Oral Daily         0.9% sodium chloride infusion  1,000 mL Intravenous Continuous 125 mL/hr at 09/03/17 1700 1,000 mL at 09/03/17 1700     metoprolol (TOPROL-XL) 24 hr tablet 200 mg  200 mg Oral Daily   200 mg at 09/04/17 0821     nitroGLYcerin (NITROSTAT) sublingual tablet 0.4 mg  0.4 mg Sublingual Q5 Min PRN         omeprazole (priLOSEC) CR capsule 40 mg  40 mg Oral QAM   40 mg at 09/04/17 0604     simvastatin (ZOCOR) tablet 40 mg  40 mg Oral At Bedtime         naloxone (NARCAN) injection 0.1-0.4 mg  0.1-0.4 mg Intravenous Q2 Min PRN         lidocaine 1 % 1 mL  1 mL Other Q1H PRN         lidocaine (LMX4) kit   Topical Q1H PRN         sodium chloride (PF) 0.9% PF flush 3 mL  3 mL Intracatheter Q1H PRN         sodium chloride (PF) 0.9% PF flush 3 mL  3 mL Intracatheter Q8H         acetaminophen (TYLENOL) tablet 650 mg  650 mg Oral Q4H PRN   650 mg at 09/04/17 0821     acetaminophen (TYLENOL) Suppository 650 mg  650 mg Rectal Q4H PRN         HYDROcodone-acetaminophen (NORCO) 5-325 MG per tablet 1-2 tablet  1-2 tablet Oral Q4H PRN         morphine (PF) injection 2-4 mg  2-4 mg Intravenous Q2H PRN         senna-docusate (SENOKOT-S;PERICOLACE) 8.6-50 MG per tablet 1-2 tablet  1-2 tablet Oral BID PRN         magnesium hydroxide (MILK OF MAGNESIA) suspension 30 mL  30 mL Oral Daily PRN         sodium phosphate (FLEET ENEMA) 1 enema  1 enema Rectal Once PRN         ondansetron (ZOFRAN-ODT) ODT tab 4 mg  4 mg Oral  Q6H PRN        Or     ondansetron (ZOFRAN) injection 4 mg  4 mg Intravenous Q6H PRN         prochlorperazine (COMPAZINE) injection 5 mg  5 mg Intravenous Q6H PRN        Or     prochlorperazine (COMPAZINE) tablet 5 mg  5 mg Oral Q6H PRN        Or     prochlorperazine (COMPAZINE) Suppository 12.5 mg  12.5 mg Rectal Q12H PRN         levofloxacin (LEVAQUIN) infusion 750 mg  750 mg Intravenous Q24H 100 mL/hr at 09/03/17 1652 750 mg at 09/03/17 1652     oxymetazoline (AFRIN) 0.05 % spray 2 spray  2 spray Both Nostrils BID         vancomycin (VANCOCIN) 1,750 mg in NaCl 0.9 % 500 mL intermittent infusion  1,750 mg Intravenous Q12H   Stopped at 09/04/17 0852     saccharomyces boulardii (FLORASTOR) capsule 250 mg  250 mg Oral BID   250 mg at 09/04/17 0822     enoxaparin (LOVENOX) injection 40 mg  40 mg Subcutaneous Q24H   40 mg at 09/03/17 1907     ibuprofen (ADVIL/MOTRIN) tablet 200 mg  200 mg Oral Q6H PRN   200 mg at 09/03/17 2345     No current [facility]-ordered outpatient prescriptions on file.       Review of Systems:   As per HPI               Physical Exam:   Vitals were reviewed.  He is febrile with Tmax 102.7.  His blood pressure is elevated this morning.  Patient Vitals for the past 24 hrs:   BP Temp Temp src Heart Rate Resp SpO2 Height Weight   09/04/17 0914 163/85 (!) 101.7  F (38.7  C) Tympanic 93 20 95 % - -   09/04/17 0813 176/98 - - 98 22 95 % - -   09/04/17 0804 - (!) 102.1  F (38.9  C) Tympanic - - - - -   09/04/17 0700 - - - - - - - 116 kg (255 lb 11.7 oz)   09/04/17 0610 - 99.3  F (37.4  C) Tympanic - - - - -   09/04/17 0203 - 100.7  F (38.2  C) Tympanic - - - - -   09/04/17 0100 - (!) 102.7  F (39.3  C) Tympanic - - - - -   09/03/17 2343 146/92 (!) 102.1  F (38.9  C) Tympanic 98 18 96 % - -   09/03/17 2048 - 100.8  F (38.2  C) Tympanic - - - - -   09/1950 - 100.1  F (37.8  C) Tympanic - - - - -   09/03/17 1623 145/87 98.4  F (36.9  C) Tympanic 90 18 96 % - -   09/03/17 1435 133/82 98.1  F (36.7  C)  "Tympanic 95 16 96 % 1.803 m (5' 11\") 116.2 kg (256 lb 2.8 oz)   09/03/17 1405 - 100.9  F (38.3  C) Oral - - - - -   09/03/17 1403 124/74 100.5  F (38.1  C) Oral 102 14 98 % - -   09/03/17 1245 137/77 - - 98 - 94 % - -   09/03/17 1215 149/87 - - 113 19 92 % - -   09/03/17 1155 157/86 (!) 102.3  F (39.1  C) Tympanic 97 20 95 % - -     Constitutional: Awake, alert, cooperative in no acute distress  Lungs: No increased work of breathing, good air exchange, clear to auscultation without rales, rhonchi or wheezes.  Cardiovascular: Irregularly irregular, tachycardic without murmurs appreciated  Abdomen: normal bowel sounds, soft, non-distended, mild tenderness to deep palpation in the suprapubic area, right and left lower quadrants.  Neurologic: Awake, alert, oriented to name, place and time.    Neuropsychiatric: Normal affect, mood, orientation, memory and insight.  Lower Extremities: trace pretibial pitting edema bilaterally.    Peripheral IV 09/03/17 Left (Active)   Site Assessment WDL 9/4/2017  8:00 AM   Line Status Infusing 9/4/2017  8:00 AM   Phlebitis Scale 0-->no symptoms 9/4/2017  8:00 AM   Infiltration Scale 0 9/4/2017  8:00 AM   Infiltration Site Treatment Method  None 9/3/2017  4:19 PM   Extravasation? No 9/3/2017  4:19 PM   Dressing Intervention New dressing  9/3/2017  2:00 PM   Number of days:1     Line/device assessment(s) completed for medical necessity         Data:     Results for orders placed or performed during the hospital encounter of 09/03/17 (from the past 24 hour(s))   CBC with platelets differential   Result Value Ref [facility]    WBC 9.9 4.0 - 11.0 10e9/L    RBC Count 4.09 (L) 4.4 - 5.9 10e12/L    Hemoglobin 13.3 13.3 - 17.7 g/dL    Hematocrit 38.1 (L) 40.0 - 53.0 %    MCV 93 78 - 100 fl    MCH 32.5 26.5 - 33.0 pg    MCHC 34.9 31.5 - 36.5 g/dL    RDW 12.5 10.0 - 15.0 %    Platelet Count 156 150 - 450 10e9/L    Diff Method Automated Method     % Neutrophils 89.8 %    % Lymphocytes 4.4 %    % " Monocytes 5.2 %    % Eosinophils 0.0 %    % Basophils 0.3 %    % Immature Granulocytes 0.3 %    Nucleated RBCs 0 0 /100    Absolute Neutrophil 8.9 (H) 1.6 - 8.3 10e9/L    Absolute Lymphocytes 0.4 (L) 0.8 - 5.3 10e9/L    Absolute Monocytes 0.5 0.0 - 1.3 10e9/L    Absolute Eosinophils 0.0 0.0 - 0.7 10e9/L    Absolute Basophils 0.0 0.0 - 0.2 10e9/L    Abs Immature Granulocytes 0.0 0 - 0.4 10e9/L    Absolute Nucleated RBC 0.0    Comprehensive metabolic panel   Result Value Ref [facility]    Sodium 135 133 - 144 mmol/L    Potassium 3.8 3.4 - 5.3 mmol/L    Chloride 104 94 - 109 mmol/L    Carbon Dioxide 23 20 - 32 mmol/L    Anion Gap 8 3 - 14 mmol/L    Glucose 188 (H) 70 - 99 mg/dL    Urea Nitrogen 15 7 - 30 mg/dL    Creatinine 1.13 0.66 - 1.25 mg/dL    GFR Estimate 65 >60 mL/min/1.7m2    GFR Estimate If Black 78 >60 mL/min/1.7m2    Calcium 8.8 8.5 - 10.1 mg/dL    Bilirubin Total 1.4 (H) 0.2 - 1.3 mg/dL    Albumin 3.2 (L) 3.4 - 5.0 g/dL    Protein Total 6.7 (L) 6.8 - 8.8 g/dL    Alkaline Phosphatase 32 (L) 40 - 150 U/L    ALT 27 0 - 70 U/L    AST 15 0 - 45 U/L   Lactic acid   Result Value Ref [facility]    Lactic Acid 1.8 0.4 - 2.0 mmol/L   Prostate spec antigen screen   Result Value Ref [facility]    PSA 8.91 (H) 0 - 4 ug/L   Troponin I   Result Value Ref [facility]    Troponin I ES <0.015 0.000 - 0.045 ug/L   D-Dimer (FV Range)   Result Value Ref [facility]    D-Dimer ng/mL 246 0 - 300 ng/ml D-DU   INR   Result Value Ref [facility]    INR 1.40 (H) 0.80 - 1.20   XR Chest 2 Views    Narrative    CHEST FRONTAL AND LATERAL VIEWS    HISTORY:  Cough.    COMPARISON:  Today's study is compared to a prior examination which is  dated September 2, 2017.    FINDINGS:  Frontal and lateral views of the chest were obtained.  The  cardiac silhouette is normal in size.  The pulmonary vasculature is  normal and the lungs are clear.    IMPRESSION:  NO ACUTE CARDIOPULMONARY DISEASE.  Exam Date: Sep 03, 2017 12:38:00 AM  Author: CARTER PANG  This report is final and  signed     Active MRSA Surveillance Culture   Result Value Ref [facility]    Specimen Description Nares     Culture Micro Culture in progress    Basic metabolic panel   Result Value Ref [facility]    Sodium 138 133 - 144 mmol/L    Potassium 3.8 3.4 - 5.3 mmol/L    Chloride 108 94 - 109 mmol/L    Carbon Dioxide 23 20 - 32 mmol/L    Anion Gap 7 3 - 14 mmol/L    Glucose 108 (H) 70 - 99 mg/dL    Urea Nitrogen 12 7 - 30 mg/dL    Creatinine 1.08 0.66 - 1.25 mg/dL    GFR Estimate 68 >60 mL/min/1.7m2    GFR Estimate If Black 82 >60 mL/min/1.7m2    Calcium 8.5 8.5 - 10.1 mg/dL   CBC with platelets differential   Result Value Ref [facility]    WBC 7.1 4.0 - 11.0 10e9/L    RBC Count 3.77 (L) 4.4 - 5.9 10e12/L    Hemoglobin 12.3 (L) 13.3 - 17.7 g/dL    Hematocrit 35.5 (L) 40.0 - 53.0 %    MCV 94 78 - 100 fl    MCH 32.6 26.5 - 33.0 pg    MCHC 34.6 31.5 - 36.5 g/dL    RDW 12.8 10.0 - 15.0 %    Platelet Count 145 (L) 150 - 450 10e9/L    Diff Method Automated Method     % Neutrophils 79.0 %    % Lymphocytes 11.1 %    % Monocytes 8.2 %    % Eosinophils 0.6 %    % Basophils 0.7 %    % Immature Granulocytes 0.4 %    Nucleated RBCs 0 0 /100    Absolute Neutrophil 5.6 1.6 - 8.3 10e9/L    Absolute Lymphocytes 0.8 0.8 - 5.3 10e9/L    Absolute Monocytes 0.6 0.0 - 1.3 10e9/L    Absolute Eosinophils 0.0 0.0 - 0.7 10e9/L    Absolute Basophils 0.1 0.0 - 0.2 10e9/L    Abs Immature Granulocytes 0.0 0 - 0.4 10e9/L    Absolute Nucleated RBC 0.0    Lactic acid   Result Value Ref [facility]    Lactic Acid 1.5 0.4 - 2.0 mmol/L   CRP inflammation   Result Value Ref [facility]    CRP Inflammation 147.0 (H) 0.0 - 8.0 mg/L     All cardiac studies reviewed by me.  All imaging studies reviewed by me.         Assessment and Plan:   Active Problems:    Urosepsis - culture positive for enterococcus sensitive to penicillin (the patient has penicillin allergy), nitrofurantoin and vancomycin.  Continue vancomycin, wait for ID on positive blood culture    Elevated blood pressure - blood  pressure elevated this morning, add HCTZ to metoprolol and monitor    Atrial Fibrillation (chronic) - Coumadin was held due to planned TKA on Thursday, 9/7/17. I am concerned re planned surgery in light of significant infection with ongoing fever and positive blood culture, consider restarting Coumadin and contacting surgeon (Dr. Bender, [facility]) re postponing surgery.    Sinusitis - continue antibiotics    CAD s/p stent - asymptomatic at this time, continue meds

## 2017-09-04 NOTE — PLAN OF CARE
"Problem: Goal Outcome Summary  Goal: Goal Outcome Summary  Outcome: No Change  Vitals: VSS. Febrile at 102.1, put on cold wash cloth, minimal blankets, and administered ibuprofen. Recheck was 102.7, gave ice pack and tylenol. Recheck was 100.7 and then 99.3. Pt becomes symptomatic with tremors when fever spikes. Will continue to monitor. /92  Temp 100.7  F (38.2  C) (Tympanic)  Resp 18  Ht 1.803 m (5' 11\")  Wt 116.2 kg (256 lb 2.8 oz)  SpO2 96%  BMI 35.73 kg/m2     Pain: Denied pain     Orientation: A&O.     Cardiac: Irregular, hx of AFib     Lungs: Clear, dim. Infrequent, nonproductive cough.     ABD: Obese, round. Active bowels.     Urinating: Pt reports frequency and retention.      Skin: Became hot and diaphoretic when fever spikes.      IV fluids: NS at 125.      Antibiotics: IV Levaquin and vanco.      Mobility: Up independently in room. Steady on feet.     Safety: Call light in room. Pt makes needs known.      Comment: Slept on and off throughout night.           Face to face report given with opportunity to observe patient.    Report given to Shae Johnson   9/4/2017  7:05 AM          "

## 2017-09-04 NOTE — PROVIDER NOTIFICATION
"Notified Dr. Paula via telephone of /98  Temp (!) 102.1  F (38.9  C) (Tympanic)  Resp 22  Ht 1.803 m (5' 11\")  Wt 116 kg (255 lb 11.7 oz)  SpO2 95%  BMI 35.67 kg/m2 P 98 Apical. Sepsis alert flag but patient had just finished ambulating to bathroom. Telephone orders with readback for Lactic Acid and CRP. Orders placed and lab notified.     "

## 2017-09-05 LAB
ANION GAP SERPL CALCULATED.3IONS-SCNC: 9 MMOL/L (ref 3–14)
BACTERIA SPEC CULT: ABNORMAL
BACTERIA SPEC CULT: ABNORMAL
BASOPHILS # BLD AUTO: 0.1 10E9/L (ref 0–0.2)
BASOPHILS NFR BLD AUTO: 0.6 %
BUN SERPL-MCNC: 10 MG/DL (ref 7–30)
CALCIUM SERPL-MCNC: 8.8 MG/DL (ref 8.5–10.1)
CHLORIDE SERPL-SCNC: 104 MMOL/L (ref 94–109)
CO2 SERPL-SCNC: 24 MMOL/L (ref 20–32)
CREAT SERPL-MCNC: 1.08 MG/DL (ref 0.66–1.25)
CRP SERPL-MCNC: 173 MG/L (ref 0–8)
DIFFERENTIAL METHOD BLD: ABNORMAL
EOSINOPHIL # BLD AUTO: 0.1 10E9/L (ref 0–0.7)
EOSINOPHIL NFR BLD AUTO: 1 %
ERYTHROCYTE [DISTWIDTH] IN BLOOD BY AUTOMATED COUNT: 12.6 % (ref 10–15)
GFR SERPL CREATININE-BSD FRML MDRD: 68 ML/MIN/1.7M2
GLUCOSE SERPL-MCNC: 111 MG/DL (ref 70–99)
HCT VFR BLD AUTO: 36.6 % (ref 40–53)
HGB BLD-MCNC: 12.5 G/DL (ref 13.3–17.7)
IMM GRANULOCYTES # BLD: 0 10E9/L (ref 0–0.4)
IMM GRANULOCYTES NFR BLD: 0.4 %
INR PPP: 1.34 (ref 0.8–1.2)
LACTATE SERPL-SCNC: 1.3 MMOL/L (ref 0.4–2)
LYMPHOCYTES # BLD AUTO: 0.7 10E9/L (ref 0.8–5.3)
LYMPHOCYTES NFR BLD AUTO: 8.8 %
MCH RBC QN AUTO: 31.9 PG (ref 26.5–33)
MCHC RBC AUTO-ENTMCNC: 34.2 G/DL (ref 31.5–36.5)
MCV RBC AUTO: 93 FL (ref 78–100)
MONOCYTES # BLD AUTO: 0.8 10E9/L (ref 0–1.3)
MONOCYTES NFR BLD AUTO: 9.9 %
NEUTROPHILS # BLD AUTO: 6.4 10E9/L (ref 1.6–8.3)
NEUTROPHILS NFR BLD AUTO: 79.3 %
NRBC # BLD AUTO: 0 10*3/UL
NRBC BLD AUTO-RTO: 0 /100
PLATELET # BLD AUTO: 158 10E9/L (ref 150–450)
POTASSIUM SERPL-SCNC: 3.4 MMOL/L (ref 3.4–5.3)
RBC # BLD AUTO: 3.92 10E12/L (ref 4.4–5.9)
SODIUM SERPL-SCNC: 137 MMOL/L (ref 133–144)
SPECIMEN SOURCE: ABNORMAL
VANCOMYCIN SERPL-MCNC: 15.7 MG/L
WBC # BLD AUTO: 8.1 10E9/L (ref 4–11)

## 2017-09-05 PROCEDURE — 25000132 ZZH RX MED GY IP 250 OP 250 PS 637: Mod: GY | Performed by: FAMILY MEDICINE

## 2017-09-05 PROCEDURE — 85610 PROTHROMBIN TIME: CPT | Performed by: FAMILY MEDICINE

## 2017-09-05 PROCEDURE — 83605 ASSAY OF LACTIC ACID: CPT | Performed by: FAMILY MEDICINE

## 2017-09-05 PROCEDURE — A9270 NON-COVERED ITEM OR SERVICE: HCPCS | Mod: GY | Performed by: FAMILY MEDICINE

## 2017-09-05 PROCEDURE — 85025 COMPLETE CBC W/AUTO DIFF WBC: CPT | Performed by: FAMILY MEDICINE

## 2017-09-05 PROCEDURE — 80048 BASIC METABOLIC PNL TOTAL CA: CPT | Performed by: FAMILY MEDICINE

## 2017-09-05 PROCEDURE — 36415 COLL VENOUS BLD VENIPUNCTURE: CPT | Performed by: FAMILY MEDICINE

## 2017-09-05 PROCEDURE — 25000128 H RX IP 250 OP 636: Performed by: FAMILY MEDICINE

## 2017-09-05 PROCEDURE — 12000000 ZZH R&B MED SURG/OB

## 2017-09-05 PROCEDURE — 25000128 H RX IP 250 OP 636

## 2017-09-05 PROCEDURE — 80202 ASSAY OF VANCOMYCIN: CPT | Performed by: FAMILY MEDICINE

## 2017-09-05 PROCEDURE — 87040 BLOOD CULTURE FOR BACTERIA: CPT | Performed by: FAMILY MEDICINE

## 2017-09-05 PROCEDURE — 86140 C-REACTIVE PROTEIN: CPT | Performed by: FAMILY MEDICINE

## 2017-09-05 RX ORDER — FUROSEMIDE 10 MG/ML
20 INJECTION INTRAMUSCULAR; INTRAVENOUS ONCE
Status: COMPLETED | OUTPATIENT
Start: 2017-09-05 | End: 2017-09-05

## 2017-09-05 RX ORDER — WARFARIN SODIUM 5 MG/1
5 TABLET ORAL
Status: COMPLETED | OUTPATIENT
Start: 2017-09-05 | End: 2017-09-05

## 2017-09-05 RX ORDER — IBUPROFEN 200 MG
200 TABLET ORAL ONCE
Status: COMPLETED | OUTPATIENT
Start: 2017-09-05 | End: 2017-09-05

## 2017-09-05 RX ORDER — SODIUM CHLORIDE 9 MG/ML
1000 INJECTION, SOLUTION INTRAVENOUS CONTINUOUS
Status: DISCONTINUED | OUTPATIENT
Start: 2017-09-05 | End: 2017-09-06

## 2017-09-05 RX ADMIN — ENOXAPARIN SODIUM 100 MG: 100 INJECTION SUBCUTANEOUS at 17:14

## 2017-09-05 RX ADMIN — IBUPROFEN 200 MG: 200 TABLET, FILM COATED ORAL at 06:19

## 2017-09-05 RX ADMIN — SIMVASTATIN 40 MG: 40 TABLET, FILM COATED ORAL at 22:09

## 2017-09-05 RX ADMIN — HYDROCODONE BITARTRATE AND ACETAMINOPHEN 1 TABLET: 5; 325 TABLET ORAL at 05:36

## 2017-09-05 RX ADMIN — TAZOBACTAM SODIUM AND PIPERACILLIN SODIUM 3.38 G: 375; 3 INJECTION, SOLUTION INTRAVENOUS at 22:04

## 2017-09-05 RX ADMIN — SODIUM CHLORIDE 1000 ML: 9 INJECTION, SOLUTION INTRAVENOUS at 05:38

## 2017-09-05 RX ADMIN — VANCOMYCIN HYDROCHLORIDE 1750 MG: 1 INJECTION, POWDER, LYOPHILIZED, FOR SOLUTION INTRAVENOUS at 07:37

## 2017-09-05 RX ADMIN — WARFARIN SODIUM 5 MG: 5 TABLET ORAL at 18:28

## 2017-09-05 RX ADMIN — TAZOBACTAM SODIUM AND PIPERACILLIN SODIUM 3.38 G: 375; 3 INJECTION, SOLUTION INTRAVENOUS at 10:07

## 2017-09-05 RX ADMIN — IBUPROFEN 200 MG: 200 TABLET, FILM COATED ORAL at 14:09

## 2017-09-05 RX ADMIN — FUROSEMIDE 20 MG: 10 INJECTION, SOLUTION INTRAVENOUS at 19:22

## 2017-09-05 RX ADMIN — HYDROCHLOROTHIAZIDE 25 MG: 25 TABLET ORAL at 09:21

## 2017-09-05 RX ADMIN — ACETAMINOPHEN 650 MG: 325 TABLET, FILM COATED ORAL at 22:09

## 2017-09-05 RX ADMIN — TAZOBACTAM SODIUM AND PIPERACILLIN SODIUM 3.38 G: 375; 3 INJECTION, SOLUTION INTRAVENOUS at 16:02

## 2017-09-05 RX ADMIN — METOPROLOL SUCCINATE 200 MG: 100 TABLET, EXTENDED RELEASE ORAL at 09:21

## 2017-09-05 RX ADMIN — Medication 250 MG: at 09:20

## 2017-09-05 RX ADMIN — OMEPRAZOLE 40 MG: 20 CAPSULE, DELAYED RELEASE ORAL at 06:19

## 2017-09-05 RX ADMIN — VANCOMYCIN HYDROCHLORIDE 1750 MG: 1 INJECTION, POWDER, LYOPHILIZED, FOR SOLUTION INTRAVENOUS at 19:26

## 2017-09-05 RX ADMIN — ACETAMINOPHEN 650 MG: 325 TABLET, FILM COATED ORAL at 05:24

## 2017-09-05 RX ADMIN — Medication 250 MG: at 22:09

## 2017-09-05 RX ADMIN — SODIUM CHLORIDE 1000 ML: 9 INJECTION, SOLUTION INTRAVENOUS at 17:21

## 2017-09-05 ASSESSMENT — ENCOUNTER SYMPTOMS: WEAKNESS: 1

## 2017-09-05 NOTE — PROGRESS NOTES
[facility] Practice Progress Note    Date of Service (when I saw the patient): 09/05/2017     Assessment & Plan   Dakota Gaston is a 66 year old male who was admitted on 9/3/2017.     Active Problems:    Urinary tract infection, site not specified    Assessment: with bacteremia, unchanged    Plan: antibiotics adjusted      DVT Prophylaxis: Enoxaparin (Lovenox) SQ  Code Status: Full Code    Krit Erwin    Interval History   Stable. Doing well. Improving slowly. Pain is reasonably controlled. Still spiking fevers. White blood count noral. Lactic acid dropping. CRP up some. Growing enterococcus in his blood and urine.      Review Of Systems  CONSTITUTIONAL:  positive for  fevers and chills  HEENT:  positive for  Sinus drainage  RESPIRATORY:  negative for  cough or shortness of breath  CARDIOVASCULAR:  negative for  chest pain, palpitations  GASTROINTESTINAL:  negative for nausea, vomiting, diarrhea and constipation  GENITOURINARY:  negative for frequency and dysuria  INTEGUMENT/BREAST:  negative for rash and skin lesion(s)  MUSCULOSKELETAL:  negative for  muscle weakness  BEHAVIOR/PSYCH:  negative for depressed mood    Physical Exam   Temp: 100.7  F (38.2  C) Temp src: Tympanic BP: (!) 187/97   Heart Rate: 102 Resp: 22 SpO2: 94 % O2 Device: None (Room air)    Vitals:    09/03/17 1435 09/04/17 0700 09/05/17 0515   Weight: 116.2 kg (256 lb 2.8 oz) 116 kg (255 lb 11.7 oz) 114.6 kg (252 lb 10.4 oz)     Vital Signs with Ranges  Temp:  [99.6  F (37.6  C)-103.7  F (39.8  C)] 100.7  F (38.2  C)  Heart Rate:  [] 102  Resp:  [18-22] 22  BP: (138-187)/(85-98) 187/97  SpO2:  [94 %-97 %] 94 %  I/O last 3 completed shifts:  In: 4275 [P.O.:840; I.V.:3435]  Out: 5145 [Urine:5145]    Peripheral IV 09/03/17 Left (Active)   Site Assessment WDL 9/5/2017  5:39 AM   Line Status Infusing 9/5/2017  5:39 AM   Phlebitis Scale 0-->no symptoms 9/5/2017  5:39 AM   Infiltration Scale 0 9/5/2017  5:39 AM    Infiltration Site Treatment Method  None 9/3/2017  4:19 PM   Extravasation? No 9/3/2017  4:19 PM   Dressing Intervention New dressing  9/3/2017  2:00 PM   Number of days:2     Line/device assessment(s) completed for medical necessity    Constitutional: awake, alert, cooperative, mild distress, appears stated age and moderately obese  ENT: normocepalic, without obvious abnormality  Respiratory: no increased work of breathing, good air exchange and clear to auscultation  Cardiovascular: regular rate and rhythm and no murmur noted  GI: normal bowel sounds, soft and non-tender  Skin: normal skin color, texture, turgor and no redness, warmth, or swelling  Musculoskeletal: no lower extremity pitting edema present  there is no redness, warmth, or swelling of the joints  Neuropsychiatric: General: normal, calm and normal eye contact  Level of consciousness: alert / normal  Affect: normal  Orientation: oriented to self, place, time and situation  Memory and insight: normal, memory for past and recent events intact and thought process normal    Medications     NaCl 1,000 mL (09/05/17 0538)       piperacillin-tazobactam  3.375 g Intravenous Q6H     hydrochlorothiazide  25 mg Oral Daily     metoprolol  200 mg Oral Daily     omeprazole (priLOSEC) CR capsule 40 mg  40 mg Oral QAM     simvastatin (ZOCOR) tablet 40 mg  40 mg Oral At Bedtime     sodium chloride (PF)  3 mL Intracatheter Q8H     oxymetazoline  2 spray Both Nostrils BID     vancomycin (VANCOCIN) IV  1,750 mg Intravenous Q12H     saccharomyces boulardii  250 mg Oral BID     enoxaparin  40 mg Subcutaneous Q24H       Data     Recent Labs  Lab 09/05/17  0526 09/04/17  0844 09/04/17  0632 09/03/17  1215 09/02/17  1458   WBC 8.1  --  7.1 9.9 13.0*   HGB 12.5*  --  12.3* 13.3 14.4   MCV 93  --  94 93 92     --  145* 156 183   INR  --   --   --  1.40* 1.43*     --  138 135 137   POTASSIUM 3.4  --  3.8 3.8 3.8   CHLORIDE 104  --  108 104 105   CO2 24  --  23 23  24   BUN 10  --  12 15 13   CR 1.08  --  1.08 1.13 1.14   ANIONGAP 9  --  7 8 8   ANANYA 8.8  --  8.5 8.8 8.7   *  --  108* 188* 124*   ALBUMIN  --  3.0*  --  3.2* 3.5   PROTTOTAL  --  6.5*  --  6.7* 7.0   BILITOTAL  --  1.0  --  1.4* 1.8*   ALKPHOS  --  27*  --  32* 37*   ALT  --  22  --  27 31   AST  --  15  --  15 16   TROPI  --   --   --  <0.015  --

## 2017-09-05 NOTE — PROVIDER NOTIFICATION
Dr. Erwin notified of pt's increasing swelling in ankles, lung sounds clear, and adequate oral intake.   One time order received for IV lasix 20 mg push.   Reduce IV continuous infusion from 125 ml/hr to 50 ml/hr

## 2017-09-05 NOTE — PLAN OF CARE
Face to face report given with opportunity to observe patient.    Report given to MAIRA Yang SN  9/5/2017  11:45 AM

## 2017-09-05 NOTE — PHARMACY
[facility]    Pharmacy    Antimicrobial Stewardship Note     Current antimicrobial therapy:  Anti-infectives (Future)    Start     Dose/Rate Route Frequency Ordered Stop    09/05/17 0715  piperacillin-tazobactam (ZOSYN) infusion 3.375 g      3.375 g  100 mL/hr over 30 Minutes Intravenous EVERY 6 HOURS 09/05/17 0712      09/03/17 1830  vancomycin (VANCOCIN) 1,750 mg in NaCl 0.9 % 500 mL intermittent infusion      1,750 mg  284 mL/hr over 2 Hours Intravenous EVERY 12 HOURS 09/03/17 1658          Indication: UTI, plus positive blood cultures     Pertinent labs:  Creatinine   Creatinine   Date Value Ref Range Status   09/05/2017 1.08 0.66 - 1.25 mg/dL Final   09/04/2017 1.08 0.66 - 1.25 mg/dL Final   09/03/2017 1.13 0.66 - 1.25 mg/dL Final     WBC   WBC   Date Value Ref Range Status   09/05/2017 8.1 4.0 - 11.0 10e9/L Final   09/04/2017 7.1 4.0 - 11.0 10e9/L Final   09/03/2017 9.9 4.0 - 11.0 10e9/L Final     ANC No components found for: ANC     Culture Results:     Component Results   Component Collected Lab   Specimen Description 09/02/2017  3:15 PM HI   Blood   Special Requests 09/02/2017  3:15 PM HI   Right Arm   Culture Micro (Abnormal) 09/02/2017  3:15 PM HI   1 of 2 bottles   Enterococcus faecalis      Culture Micro 09/02/2017  3:15 PM HI   Critical Value:   Gram stain results of anaerobic bottle called to and read back by   Kellie Ramirez at 0646 on 09/03/17 by Cesar Ramirez      Culture & Susceptibility   ENTEROCOCCUS FAECALIS   Antibiotic Interpretation Sensitivity Unit Method Status   AMPICILLIN Sensitive <=2 ug/mL RENATA Preliminary   Gentamicin Screen Resistant   RENATA Preliminary   Comment: High level gentamicin resistance was found and this is predictive of   resistance to tobramycin and amikacin.   PENICILLIN Sensitive 1 ug/mL RENATA Preliminary   VANCOMYCIN Sensitive <=0.5 ug/mL RENATA Preliminary        Component Results   Component Collected Lab   Specimen Description 09/02/2017  3:24 PM HI    Midstream Urine   Culture Micro (Abnormal) 09/02/2017  3:24 PM HI   >100,000 colonies/mL   Enterococcus faecalis      Culture Micro (Abnormal) 09/02/2017  3:24 PM HI   10,000 to 50,000 colonies/mL   Streptococcus viridans group   No further identification or sensitivity done      Culture & Susceptibility   ENTEROCOCCUS FAECALIS   Antibiotic Interpretation Sensitivity Unit Method Status   AMPICILLIN Sensitive <=2 ug/mL RENATA Final   NITROFURANTOIN Sensitive <=16 ug/mL RENATA Final   PENICILLIN Sensitive 2 ug/mL RENATA Final   VANCOMYCIN Sensitive 1 ug/mL RENATA Final          Urine Culture Aerobic Bacterial [[id number]]        Order Status: No result Specimen: Urine        Blood culture [[id number]]        Order Status: Sent Specimen: Blood        Blood culture [[id number]]        Order Status: Sent Specimen: Blood        Active MRSA Surveillance Culture [[id number]] Collected: 09/03/17 1630       Order Status: Completed Specimen: Nares from Nasopharyngeal Updated: 09/04/17 1345        Specimen Description Nares        Culture Micro No MRSA isolated       Recommendations/Interventions:  1. Patient has a Penicillin G allergy with an undocumented reaction. Currently receiving Zosyn. Seems to be tolerating without any adverse effects. Pt feeling better and afebrile, with decreasing WBC. Will continue to monitor and provide recommendations when appropriate.     Calista Kovacs RPH  September 5, 2017

## 2017-09-05 NOTE — PROVIDER NOTIFICATION
Spoke to Dr. Paula this morning regarding pt elevated fever 103.7, elevated bp 187/97, tachycardia 130.  Received phone orders for a lactic, blood cultures and additional 200 mg of Ibuprofen to be given now.

## 2017-09-05 NOTE — PLAN OF CARE
Problem: Goal Outcome Summary  Goal: Goal Outcome Summary  Outcome: No Change  BP elevated during the night highest 187/97, pt fever did spike at 0522 to 103.7, 650 mg of tylenol given along with 1 norco for pain, ice pack was applied under right arm, removed extra blankets from pt and cool wash cloth was applied to pt forehead. Pt was encouraged to drink fluids. Spoke to , lactic and blood cultures were ordered, and an additional dose of 200 mg Ibuprofen was ordered. Lactic came back at 1.3. Spoke to Dr. Paula again this morning regarding new orders for Invanz, Dr. Paula notified of decrease in temperature to 100.7 and lactic 1.3, pharmacy had concerns regarding pt hx of allergy to penicillin, Dr. Paula transferred to pharmacy. Pt encouraged to eat and drink something this am, ordered orange juice and fruit per pt request. Lungs are clear, and pt is up independently in the room. Pt has voided 1825 mL this shift.       Face to face report given with opportunity to observe patient.     Report given to MAIRA Yang and MAIRA Loredo RN  9/5/2017  7:51 AM

## 2017-09-05 NOTE — PHARMACY-VANCOMYCIN DOSING SERVICE
Pharmacy Vancomycin Note  Date of Service 2017  Patient's  1950   66 year old, male    Indication: Urinary Tract Infection  Goal Trough Level: 10-15 mg/L  Day of Therapy: 3  Current Vancomycin regimen:  1750 mg IV q12h    Current estimated CrCl = Estimated Creatinine Clearance: 85.4 mL/min (based on Cr of 1.08).    Creatinine for last 3 days  2017:  2:58 PM Creatinine 1.14 mg/dL  9/3/2017: 12:15 PM Creatinine 1.13 mg/dL  2017:  6:32 AM Creatinine 1.08 mg/dL  2017:  5:26 AM Creatinine 1.08 mg/dL    Recent Vancomycin Levels (past 3 days)  2017:  5:26 AM Vancomycin Level 15.7 mg/L    Vancomycin IV Administrations (past 72 hours)                   vancomycin (VANCOCIN) 1,750 mg in NaCl 0.9 % 500 mL intermittent infusion (mg) 1,750 mg New Bag 17 0737     1,750 mg New Bag 17 1925     1,750 mg New Bag  0604     1,750 mg New Bag 17 1907                Nephrotoxins and other renal medications (Future)    Start     Dose/Rate Route Frequency Ordered Stop    17 0715  piperacillin-tazobactam (ZOSYN) infusion 3.375 g      3.375 g  100 mL/hr over 30 Minutes Intravenous EVERY 6 HOURS 17 0712      17 2321  ibuprofen (ADVIL/MOTRIN) tablet 200 mg      200 mg Oral EVERY 6 HOURS PRN 17 2321      17 1830  vancomycin (VANCOCIN) 1,750 mg in NaCl 0.9 % 500 mL intermittent infusion      1,750 mg  284 mL/hr over 2 Hours Intravenous EVERY 12 HOURS 17 1658               Contrast Orders - past 72 hours (72h ago through future)    Start     Dose/Rate Route Frequency Ordered Stop    17 1900  iopamidol (ISOVUE-300) IV solution 61% 100 mL      100 mL Intravenous ONCE 17 1852 17 1859          Interpretation of levels and current regimen:  Trough level is Supratherapeutic (15.7), although will continue with current dose and frequency because it is very close to goal (10-15) and renal function stable     Has serum creatinine changed > 50% in  last 72 hours: No    Urine output:  good urine output    Renal Function: Stable    Plan:  1. Continue Current Dose  2. Pharmacy will check trough levels as appropriate in 1-3 Days.    3. Serum creatinine levels will be ordered daily for the first week of therapy and at least twice weekly for subsequent weeks.      Calista Kovacs RPh        .

## 2017-09-05 NOTE — PLAN OF CARE
Problem: Goal Outcome Summary  Goal: Goal Outcome Summary  Outcome: No Change  Pt A&O, BP elevated as charted, MD aware.  HR tachy 105, max T of 102.8.  Gave Tylenol and Ibuprofen along with placing ice under armpits and to back of neck for fever reduction. Pt gets rigors when fever spikes.  CT of abdoman with contrast ordered and done.  Called MD with results and MD stated to give pt results as well which I did. IV antbtx continue with NS infusing at 125 mls.hr.  Vanco was given late due to pt being off floor for CT, dose given and rescheduled.  Pt has clear sinus drainage and like to be upright to relieve congestion, he tried sleeping in recliner chair as he does at home but says was too uncomfortable and went back in the bed. He has poor appetite, fluids encouraged to flush IV contrast after CT.  He gets up to BR independently and stable on feet, uses urinal in BR then call for it to be emptied.  I/O's as charted.  Pt steady on feet, call light in reach and makes needs known.   Pts states he is occasional having pain in left gland in neck.     Face to face report given with opportunity to observe patient.     Report given to Rex Roland   9/5/2017  12:20 AM

## 2017-09-05 NOTE — PLAN OF CARE
Problem: Patient Goal: Social Work Focus  Goal: 1. Patient Goal: Social Work Focus  Met with Melo, feeling much better, no needs identified. CM will remain available.

## 2017-09-05 NOTE — PLAN OF CARE
Problem: Goal Outcome Summary  Goal: Goal Outcome Summary  Outcome: No Change  Pt alert and oriented. Afebrile this shifts but with elevated temperatures on last shift Vital signs:  Temp: 96.7  F (35.9  C) Temp src: Tympanic BP: 133/82   Heart Rate: 87 Resp: 19 SpO2: 96 % O2 Device: None (Room air)     Pt denies pain at beginning of shift but complains of a headache this afternoon which is adequately controlled with PRN Ibuprofen. Apical pulse irregular consistent with dx of a. fib. Lung sounds clear, some dyspnea with exertion. Pt has little appetite but tolerates food. Ambulates independently in room and voids in bathroom. Pt currently visiting with family in room, call light within reach, will continue to monitor.                 Problem: Urinary Tract Infection (Adult)  Goal: Signs and Symptoms of Listed Potential Problems Will be Absent or Manageable (Urinary Tract Infection)  Signs and symptoms of listed potential problems will be absent or manageable by discharge/transition of care (reference Urinary Tract Infection (Adult) CPG).   Outcome: No Change    09/05/17 0815   Urinary Tract Infection   Problems Assessed (Urinary Tract Infection) all   Problems Present (Urinary Tract Infection) none         Face to face report given with opportunity to observe patient.    Report given to Jailene Ashby   9/5/2017  3:13 PM

## 2017-09-05 NOTE — PHARMACY-ANTICOAGULATION SERVICE
Dr. Paula ordered a pharmacy consult to dose warfarin therapy because knee surgery not taking place Thursday after all. Patient receives anticoagulation services from [facility] clinic and is a patient of Dr. Gray. Will dose today's warfarin and will remain available for questions/concerns once Dr. Gray resumes care tomorrow (per Dr. Erwin). INR today is 1.34. Will dose at 5 mg tonight.     Calista Kovacs RP

## 2017-09-05 NOTE — PLAN OF CARE
Spoke to Dr. Paula Lactic Acid is 1.3, temperature is  100.7 and Dr. Paula notified me of new antibiotic order of Invanz, notified her of pharmacies concern regarding allergy to penicillin and giving Invanz, Dr. Paula transferred to pharmacy.

## 2017-09-06 PROBLEM — I10 BENIGN ESSENTIAL HTN: Status: ACTIVE | Noted: 2017-09-06

## 2017-09-06 PROBLEM — A41.81 SEPSIS DUE TO ENTEROCOCCUS (H): Status: ACTIVE | Noted: 2017-09-06

## 2017-09-06 LAB
ANION GAP SERPL CALCULATED.3IONS-SCNC: 9 MMOL/L (ref 3–14)
BUN SERPL-MCNC: 11 MG/DL (ref 7–30)
CALCIUM SERPL-MCNC: 8.3 MG/DL (ref 8.5–10.1)
CHLORIDE SERPL-SCNC: 103 MMOL/L (ref 94–109)
CO2 SERPL-SCNC: 27 MMOL/L (ref 20–32)
CREAT SERPL-MCNC: 1.1 MG/DL (ref 0.66–1.25)
CRP SERPL-MCNC: 179 MG/L (ref 0–8)
ERYTHROCYTE [DISTWIDTH] IN BLOOD BY AUTOMATED COUNT: 12.5 % (ref 10–15)
GFR SERPL CREATININE-BSD FRML MDRD: 67 ML/MIN/1.7M2
GLUCOSE SERPL-MCNC: 99 MG/DL (ref 70–99)
HCT VFR BLD AUTO: 32.4 % (ref 40–53)
HGB BLD-MCNC: 11.2 G/DL (ref 13.3–17.7)
INR PPP: 1.31 (ref 0.8–1.2)
MCH RBC QN AUTO: 31.9 PG (ref 26.5–33)
MCHC RBC AUTO-ENTMCNC: 34.6 G/DL (ref 31.5–36.5)
MCV RBC AUTO: 92 FL (ref 78–100)
PLATELET # BLD AUTO: 161 10E9/L (ref 150–450)
POTASSIUM SERPL-SCNC: 3.2 MMOL/L (ref 3.4–5.3)
POTASSIUM SERPL-SCNC: 3.3 MMOL/L (ref 3.4–5.3)
RBC # BLD AUTO: 3.51 10E12/L (ref 4.4–5.9)
SODIUM SERPL-SCNC: 139 MMOL/L (ref 133–144)
WBC # BLD AUTO: 6.2 10E9/L (ref 4–11)

## 2017-09-06 PROCEDURE — 25000128 H RX IP 250 OP 636: Performed by: FAMILY MEDICINE

## 2017-09-06 PROCEDURE — 84132 ASSAY OF SERUM POTASSIUM: CPT | Performed by: FAMILY MEDICINE

## 2017-09-06 PROCEDURE — 36415 COLL VENOUS BLD VENIPUNCTURE: CPT | Performed by: FAMILY MEDICINE

## 2017-09-06 PROCEDURE — 25000132 ZZH RX MED GY IP 250 OP 250 PS 637: Mod: GY | Performed by: FAMILY MEDICINE

## 2017-09-06 PROCEDURE — 12000000 ZZH R&B MED SURG/OB

## 2017-09-06 PROCEDURE — A9270 NON-COVERED ITEM OR SERVICE: HCPCS | Mod: GY | Performed by: FAMILY MEDICINE

## 2017-09-06 PROCEDURE — 85027 COMPLETE CBC AUTOMATED: CPT | Performed by: FAMILY MEDICINE

## 2017-09-06 PROCEDURE — 86140 C-REACTIVE PROTEIN: CPT | Performed by: FAMILY MEDICINE

## 2017-09-06 PROCEDURE — 80048 BASIC METABOLIC PNL TOTAL CA: CPT | Performed by: FAMILY MEDICINE

## 2017-09-06 PROCEDURE — 85610 PROTHROMBIN TIME: CPT | Performed by: FAMILY MEDICINE

## 2017-09-06 RX ORDER — POTASSIUM CHLORIDE 1500 MG/1
20-40 TABLET, EXTENDED RELEASE ORAL
Status: DISCONTINUED | OUTPATIENT
Start: 2017-09-06 | End: 2017-09-08 | Stop reason: HOSPADM

## 2017-09-06 RX ORDER — POTASSIUM CHLORIDE 7.45 MG/ML
10 INJECTION INTRAVENOUS
Status: DISCONTINUED | OUTPATIENT
Start: 2017-09-06 | End: 2017-09-08 | Stop reason: HOSPADM

## 2017-09-06 RX ORDER — WARFARIN SODIUM 5 MG/1
5 TABLET ORAL
Status: DISCONTINUED | OUTPATIENT
Start: 2017-09-06 | End: 2017-09-07

## 2017-09-06 RX ORDER — POTASSIUM CHLORIDE 1.5 G/1.58G
20-40 POWDER, FOR SOLUTION ORAL
Status: DISCONTINUED | OUTPATIENT
Start: 2017-09-06 | End: 2017-09-08 | Stop reason: HOSPADM

## 2017-09-06 RX ORDER — POTASSIUM CL/LIDO/0.9 % NACL 10MEQ/0.1L
10 INTRAVENOUS SOLUTION, PIGGYBACK (ML) INTRAVENOUS
Status: DISCONTINUED | OUTPATIENT
Start: 2017-09-06 | End: 2017-09-08 | Stop reason: HOSPADM

## 2017-09-06 RX ADMIN — ACETAMINOPHEN 650 MG: 325 TABLET, FILM COATED ORAL at 04:45

## 2017-09-06 RX ADMIN — OMEPRAZOLE 40 MG: 20 CAPSULE, DELAYED RELEASE ORAL at 06:38

## 2017-09-06 RX ADMIN — VANCOMYCIN HYDROCHLORIDE 1750 MG: 1 INJECTION, POWDER, LYOPHILIZED, FOR SOLUTION INTRAVENOUS at 06:34

## 2017-09-06 RX ADMIN — Medication 250 MG: at 22:00

## 2017-09-06 RX ADMIN — SIMVASTATIN 40 MG: 40 TABLET, FILM COATED ORAL at 22:00

## 2017-09-06 RX ADMIN — TAZOBACTAM SODIUM AND PIPERACILLIN SODIUM 3.38 G: 375; 3 INJECTION, SOLUTION INTRAVENOUS at 15:54

## 2017-09-06 RX ADMIN — TAZOBACTAM SODIUM AND PIPERACILLIN SODIUM 3.38 G: 375; 3 INJECTION, SOLUTION INTRAVENOUS at 22:17

## 2017-09-06 RX ADMIN — TAZOBACTAM SODIUM AND PIPERACILLIN SODIUM 3.38 G: 375; 3 INJECTION, SOLUTION INTRAVENOUS at 03:59

## 2017-09-06 RX ADMIN — POTASSIUM CHLORIDE 40 MEQ: 1.5 POWDER, FOR SOLUTION ORAL at 19:17

## 2017-09-06 RX ADMIN — WARFARIN SODIUM 5 MG: 5 TABLET ORAL at 17:42

## 2017-09-06 RX ADMIN — Medication 250 MG: at 08:50

## 2017-09-06 RX ADMIN — ENOXAPARIN SODIUM 100 MG: 100 INJECTION SUBCUTANEOUS at 17:42

## 2017-09-06 RX ADMIN — ENOXAPARIN SODIUM 100 MG: 100 INJECTION SUBCUTANEOUS at 04:45

## 2017-09-06 RX ADMIN — TAZOBACTAM SODIUM AND PIPERACILLIN SODIUM 3.38 G: 375; 3 INJECTION, SOLUTION INTRAVENOUS at 10:15

## 2017-09-06 RX ADMIN — VANCOMYCIN HYDROCHLORIDE 1750 MG: 1 INJECTION, POWDER, LYOPHILIZED, FOR SOLUTION INTRAVENOUS at 19:44

## 2017-09-06 RX ADMIN — HYDROCHLOROTHIAZIDE 25 MG: 25 TABLET ORAL at 08:50

## 2017-09-06 RX ADMIN — METOPROLOL SUCCINATE 200 MG: 100 TABLET, EXTENDED RELEASE ORAL at 08:50

## 2017-09-06 RX ADMIN — POTASSIUM CHLORIDE 40 MEQ: 20 TABLET, EXTENDED RELEASE ORAL at 10:16

## 2017-09-06 RX ADMIN — POTASSIUM CHLORIDE 20 MEQ: 1.5 POWDER, FOR SOLUTION ORAL at 22:01

## 2017-09-06 RX ADMIN — POTASSIUM CHLORIDE 20 MEQ: 20 TABLET, EXTENDED RELEASE ORAL at 14:20

## 2017-09-06 ASSESSMENT — PAIN DESCRIPTION - DESCRIPTORS: DESCRIPTORS: HEADACHE

## 2017-09-06 NOTE — PROGRESS NOTES
[facility]  Progress Note            Subjective:   Is finally feeling better. Has appetite this am. Less fever yesterday. But still had rigors yesterday.  Back feels sore from the bed.              Medications:     Current Facility-Administered Medications Ordered in Epic   Medication Dose Route Frequency Last Rate Last Dose     piperacillin-tazobactam (ZOSYN) infusion 3.375 g  3.375 g Intravenous Q6H 100 mL/hr at 09/06/17 0359 3.375 g at 09/06/17 0359     enoxaparin (LOVENOX) injection 100 mg  100 mg Subcutaneous Q12H   100 mg at 09/06/17 0445     Warfarin Therapy Reminder (Check START DATE - warfarin may be starting in the FUTURE)  1 each Does not apply Continuous PRN         0.9% sodium chloride infusion  1,000 mL Intravenous Continuous 50 mL/hr at 09/05/17 1934 1,000 mL at 09/05/17 1934     hydrochlorothiazide (HYDRODIURIL) tablet 25 mg  25 mg Oral Daily   25 mg at 09/05/17 0921     metoprolol (TOPROL-XL) 24 hr tablet 200 mg  200 mg Oral Daily   200 mg at 09/05/17 0921     nitroGLYcerin (NITROSTAT) sublingual tablet 0.4 mg  0.4 mg Sublingual Q5 Min PRN         omeprazole (priLOSEC) CR capsule 40 mg  40 mg Oral QAM   40 mg at 09/05/17 0619     simvastatin (ZOCOR) tablet 40 mg  40 mg Oral At Bedtime   40 mg at 09/05/17 2209     naloxone (NARCAN) injection 0.1-0.4 mg  0.1-0.4 mg Intravenous Q2 Min PRN         lidocaine 1 % 1 mL  1 mL Other Q1H PRN         lidocaine (LMX4) kit   Topical Q1H PRN         sodium chloride (PF) 0.9% PF flush 3 mL  3 mL Intracatheter Q1H PRN         sodium chloride (PF) 0.9% PF flush 3 mL  3 mL Intracatheter Q8H         acetaminophen (TYLENOL) tablet 650 mg  650 mg Oral Q4H PRN   650 mg at 09/06/17 0445     acetaminophen (TYLENOL) Suppository 650 mg  650 mg Rectal Q4H PRN         HYDROcodone-acetaminophen (NORCO) 5-325 MG per tablet 1-2 tablet  1-2 tablet Oral Q4H PRN   1 tablet at 09/05/17 0536     morphine (PF) injection 2-4 mg  2-4 mg Intravenous Q2H PRN          senna-docusate (SENOKOT-S;PERICOLACE) 8.6-50 MG per tablet 1-2 tablet  1-2 tablet Oral BID PRN         magnesium hydroxide (MILK OF MAGNESIA) suspension 30 mL  30 mL Oral Daily PRN         sodium phosphate (FLEET ENEMA) 1 enema  1 enema Rectal Once PRN         ondansetron (ZOFRAN-ODT) ODT tab 4 mg  4 mg Oral Q6H PRN        Or     ondansetron (ZOFRAN) injection 4 mg  4 mg Intravenous Q6H PRN         prochlorperazine (COMPAZINE) injection 5 mg  5 mg Intravenous Q6H PRN        Or     prochlorperazine (COMPAZINE) tablet 5 mg  5 mg Oral Q6H PRN        Or     prochlorperazine (COMPAZINE) Suppository 12.5 mg  12.5 mg Rectal Q12H PRN         oxymetazoline (AFRIN) 0.05 % spray 2 spray  2 spray Both Nostrils BID         vancomycin (VANCOCIN) 1,750 mg in NaCl 0.9 % 500 mL intermittent infusion  1,750 mg Intravenous Q12H 284 mL/hr at 09/05/17 1926 1,750 mg at 09/05/17 1926     saccharomyces boulardii (FLORASTOR) capsule 250 mg  250 mg Oral BID   250 mg at 09/05/17 2209     ibuprofen (ADVIL/MOTRIN) tablet 200 mg  200 mg Oral Q6H PRN   200 mg at 09/05/17 1409     No current [facility]-ordered outpatient prescriptions on file.       Review of Systems:   C: as above  E/M: NEGATIVE for ear, mouth and throat problems  CV: NEGATIVE for chest pain, palpitations or peripheral edema  R: [id number] for significant cough or SOB2               Physical Exam:   Vitals were reviewed  Patient Vitals for the past 24 hrs:   BP Temp Temp src Heart Rate Resp SpO2 Weight   09/06/17 0500 - - - - - - 252 lb 6.8 oz (114.5 kg)   09/06/17 0358 - 99.6  F (37.6  C) Tympanic - - - -   09/06/17 0112 131/87 99.7  F (37.6  C) Tympanic 102 16 95 % -   09/05/17 2206 - 101.1  F (38.4  C) Tympanic - - - -   09/05/17 1919 137/88 100.9  F (38.3  C) Tympanic - - - -   09/05/17 1841 - 100.6  F (38.1  C) Tympanic - - - -   09/05/17 1610 (!) 142/107 99.7  F (37.6  C) Tympanic 97 - 96 % -   09/05/17 1409 - 99  F (37.2  C) Tympanic - - - -   09/05/17 1105 133/82 96.7  F (35.9   C) Tympanic 87 19 96 % -   09/05/17 0800 147/87 97.4  F (36.3  C) Tympanic 84 19 93 % -   09/05/17 0650 - 100.7  F (38.2  C) Tympanic - - - -   09/05/17 0616 - (!) 101.8  F (38.8  C) Tympanic - - - -     Constitutional: Awake, alert, cooperative.  Eyes:  sclera clear  Lungs: No increased work of breathing, good air exchange, clear to auscultation bilaterally, no crackles or wheezing.  Cardiovascular: irregular  Abdomen: normal bowel sounds, soft, non-distended, non-tender, no masses palpated,   Neurologic: Awake, alert, oriented to name, place and time.    Neuropsychiatric: Normal affect, mood, orientation, memory and insight.  Skin: No rashes      Peripheral IV 09/03/17 Left (Active)   Site Assessment WDL 9/5/2017  4:00 PM   Line Status Infusing 9/5/2017  4:00 PM   Phlebitis Scale 0-->no symptoms 9/5/2017  4:00 PM   Infiltration Scale 0 9/5/2017  4:00 PM   Infiltration Site Treatment Method  None 9/5/2017  4:00 PM   Extravasation? No 9/3/2017  4:19 PM   Dressing Intervention New dressing  9/3/2017  2:00 PM   Number of days:3     Line/device assessment(s) completed for medical necessity         Data:     Results for orders placed or performed during the hospital encounter of 09/03/17 (from the past 24 hour(s))   INR   Result Value Ref Range    INR 1.34 (H) 0.80 - 1.20   CBC with platelets   Result Value Ref Range    WBC 6.2 4.0 - 11.0 10e9/L    RBC Count 3.51 (L) 4.4 - 5.9 10e12/L    Hemoglobin 11.2 (L) 13.3 - 17.7 g/dL    Hematocrit 32.4 (L) 40.0 - 53.0 %    MCV 92 78 - 100 fl    MCH 31.9 26.5 - 33.0 pg    MCHC 34.6 31.5 - 36.5 g/dL    RDW 12.5 10.0 - 15.0 %    Platelet Count 161 150 - 450 10e9/L     All cardiac studies reviewed by me.  All imaging studies reviewed by me.         Assessment and Plan:   Principal Problem:    Sepsis due to Enterococcus (H)  Continue vanco and zosyn til pt afeb x 24 hrs. Will need to delay knee replacement for at least 4-6 wks  Active Problems:    Atrial fibrillation (H)   rate  controlled. Was on bridging lovenox because of being off coumadin but that has been restarted so will stop lov once therapeutic.    Urinary tract infection, site not specified  w sepsis, as above    Benign essential HTN   bp stable

## 2017-09-06 NOTE — PLAN OF CARE
Problem: Goal Outcome Summary  Goal: Goal Outcome Summary  Outcome: Improving  VSS except for temperature of 101.1, symptomatic with rigors, tylenol 650 mg given for comfort. Lungs clear. Ambulating independently in room. IV rate decreased to 50 ml/hr. One dose of IV lasix given per MD order. Received 5 mg dose of coumadin tonight. Receiving IV vanco and zosyn. +1 edema present in BLE. Poor appetite this shift. Nursing student helped with cares from [id number].     Problem: Urinary Tract Infection (Adult)  Goal: Signs and Symptoms of Listed Potential Problems Will be Absent or Manageable (Urinary Tract Infection)  Signs and symptoms of listed potential problems will be absent or manageable by discharge/transition of care (reference Urinary Tract Infection (Adult) CPG).     09/05/17 1946   Urinary Tract Infection   Problems Assessed (Urinary Tract Infection) all   Problems Present (Urinary Tract Infection) none         Face to face report given with opportunity to observe patient.    Report given to MAIRA Goodman   9/6/2017  12:04 AM

## 2017-09-06 NOTE — PHARMACY
[facility]    Pharmacy    Antimicrobial Stewardship Note     Current antimicrobial therapy:  Anti-infectives (Future)    Start     Dose/Rate Route Frequency Ordered Stop    09/05/17 0715  piperacillin-tazobactam (ZOSYN) infusion 3.375 g      3.375 g  100 mL/hr over 30 Minutes Intravenous EVERY 6 HOURS 09/05/17 0712      09/03/17 1830  vancomycin (VANCOCIN) 1,750 mg in NaCl 0.9 % 500 mL intermittent infusion      1,750 mg  284 mL/hr over 2 Hours Intravenous EVERY 12 HOURS 09/03/17 1658          Indication: UTI, plus positive blood cultures     Pertinent labs:  Creatinine   Creatinine   Date Value Ref Range Status   09/06/2017 1.10 0.66 - 1.25 mg/dL Final   09/05/2017 1.08 0.66 - 1.25 mg/dL Final   09/04/2017 1.08 0.66 - 1.25 mg/dL Final     WBC   WBC   Date Value Ref Range Status   09/06/2017 6.2 4.0 - 11.0 10e9/L Final   09/05/2017 8.1 4.0 - 11.0 10e9/L Final   09/04/2017 7.1 4.0 - 11.0 10e9/L Final     ANC No components found for: ANC     Culture Results:     Component Results   Component Collected Lab   Specimen Description 09/02/2017  3:15 PM HI   Blood   Special Requests 09/02/2017  3:15 PM HI   Right Arm   Culture Micro (Abnormal) 09/02/2017  3:15 PM HI   1 of 2 bottles   Enterococcus faecalis     Culture Micro 09/02/2017  3:15 PM HI   Critical Value:   Gram stain results of anaerobic bottle called to and read back by   Kellie Ramirez at 0646 on 09/03/17 by Cesar Ramirez     Culture & Susceptibility   ENTEROCOCCUS FAECALIS   Antibiotic Interpretation Sensitivity Unit Method Status   AMPICILLIN Sensitive <=2 ug/mL RENATA Preliminary   Gentamicin Screen Resistant     RENATA Preliminary   Comment: High level gentamicin resistance was found and this is predictive of   resistance to tobramycin and amikacin.   PENICILLIN Sensitive 1 ug/mL RENATA Preliminary   VANCOMYCIN Sensitive <=0.5 ug/mL RENATA Preliminary         Component Results   Component Collected Lab   Specimen Description 09/02/2017  3:24 PM HI    Midstream Urine   Culture Micro (Abnormal) 09/02/2017  3:24 PM HI   >100,000 colonies/mL   Enterococcus faecalis     Culture Micro (Abnormal) 09/02/2017  3:24 PM HI   10,000 to 50,000 colonies/mL   Streptococcus viridans group   No further identification or sensitivity done     Culture & Susceptibility   ENTEROCOCCUS FAECALIS   Antibiotic Interpretation Sensitivity Unit Method Status   AMPICILLIN Sensitive <=2 ug/mL RENATA Final   NITROFURANTOIN Sensitive <=16 ug/mL RENATA Final   PENICILLIN Sensitive 2 ug/mL RENATA Final   VANCOMYCIN Sensitive 1 ug/mL RENATA Final          Blood culture [[id number]] Collected: 09/05/17 0632       Order Status: Completed Specimen: Blood Updated: 09/06/17 0616        Specimen Description Blood Right Hand        Special Requests 10ML EACH        Culture Micro No growth after 23 hours       Blood culture [[id number]] Collected: 09/05/17 0628       Order Status: Completed Specimen: Blood Updated: 09/06/17 0616        Specimen Description Blood Right Arm        Special Requests 5ML AERO, 2ML ANO        Culture Micro No growth after 23 hours       Urine Culture Aerobic Bacterial [[id number]]        Order Status: No result Specimen: Urine          Recommendations/Interventions:  1. None at this time    Calista Kovacs RPH  September 6, 2017

## 2017-09-06 NOTE — PLAN OF CARE
Problem: Goal Outcome Summary  Goal: Goal Outcome Summary  Outcome: Improving  VSS, pt temperature remained below 100 the entire shift, pt was able to sleep last night, and slept well per pt report. Pt does report this morning that his appetite has returned, and has already been up this morning, cleaned himself up, walked the halls and gotten coffee from the guest lounge. IV Zosyn given and IV Vanco started this morning. Pt is alert and oriented x 4 and competly independent. Pt voided 925 mL this shift, and states that he voids without difficulty. Pt has slight edema in BLE. Pt did report a slight headache 4/10 pain gave tylenol for pain, headache was relieved.      Face to face report given with opportunity to observe patient.     Report given to Sebastián DUNNE RN and MAIRA Landaverde RN   9/6/2017  8:16 AM           Problem: Urinary Tract Infection (Adult)  Goal: Signs and Symptoms of Listed Potential Problems Will be Absent or Manageable (Urinary Tract Infection)  Signs and symptoms of listed potential problems will be absent or manageable by discharge/transition of care (reference Urinary Tract Infection (Adult) CPG).   Outcome: Improving    09/06/17 0809   Urinary Tract Infection   Problems Assessed (Urinary Tract Infection) all   Problems Present (Urinary Tract Infection) none

## 2017-09-06 NOTE — PROGRESS NOTES
Stopped in to meet Melo. He is visiting with company at this time. Denies any new needs at this time and is aware he can call if needs arise. He expects to discharge home and will have a ride available at that time.  CTS will remain available.

## 2017-09-06 NOTE — PROGRESS NOTES
Face to face report given with opportunity to observe patient.    Report given to MAIRA Lowery SN  9/6/2017  11:32 AM

## 2017-09-06 NOTE — PLAN OF CARE
End of shift report given to MAIRA Dent. Updated on pain, I&O, patient currently comfortable, resting in chair, offers no complaints. IV site patency checked and WNL. Free of redness, cool to touch, dry, no pain. Patient ambulatory throughout shift.

## 2017-09-06 NOTE — PLAN OF CARE
"Problem: Goal Outcome Summary  Goal: Goal Outcome Summary  Outcome: No Change  Reason for hospital stay:  Sepsis / UTI     Most recent vitals: /93  Temp 96  F (35.6  C) (Tympanic)  Resp 19  Ht 1.803 m (5' 11\")  Wt 114.5 kg (252 lb 6.8 oz)  SpO2 95%  BMI 35.21 kg/m2     Pain Management:  Denies any pain this shift     Orientation:  A+O x4     Cardiac:  Irregular apical pulse, +1 generalized edema noted     Respiratory:  Lungs clear throughout, Sats 95% on RA, coughing and deep breathing independently     GI:  Abd distended - BS present x 4, passing flatus, Had small BM this shift.      :  C/o hesitancy with voids - voiding adequate amounts of urine     ABX:  Continues with IV Zosyn and Vanco     Mobility:  Independent - Ambulating in room and hallway this shift     Safety:  Alarms off - steady gait        9/6/2017  2:43 PM  Sebastián Dotson           Problem: Urinary Tract Infection (Adult)  Goal: Signs and Symptoms of Listed Potential Problems Will be Absent or Manageable (Urinary Tract Infection)  Signs and symptoms of listed potential problems will be absent or manageable by discharge/transition of care (reference Urinary Tract Infection (Adult) CPG).   Outcome: No Change    09/06/17 0809 09/06/17 1443   Urinary Tract Infection   Problems Assessed (Urinary Tract Infection) all --    Problems Present (Urinary Tract Infection) --  fluid and electrolyte imbalance         Face to face report given with opportunity to observe patient.    Report given to Jailene Dotson   9/6/2017  3:28 PM      "

## 2017-09-07 LAB
CREAT SERPL-MCNC: 1.16 MG/DL (ref 0.66–1.25)
GFR SERPL CREATININE-BSD FRML MDRD: 63 ML/MIN/1.7M2
INR PPP: 1.38 (ref 0.8–1.2)
POTASSIUM SERPL-SCNC: 3.8 MMOL/L (ref 3.4–5.3)
VANCOMYCIN SERPL-MCNC: 28.9 MG/L

## 2017-09-07 PROCEDURE — 80202 ASSAY OF VANCOMYCIN: CPT | Performed by: FAMILY MEDICINE

## 2017-09-07 PROCEDURE — 36415 COLL VENOUS BLD VENIPUNCTURE: CPT | Performed by: FAMILY MEDICINE

## 2017-09-07 PROCEDURE — 85610 PROTHROMBIN TIME: CPT | Performed by: FAMILY MEDICINE

## 2017-09-07 PROCEDURE — 25000132 ZZH RX MED GY IP 250 OP 250 PS 637: Mod: GY | Performed by: FAMILY MEDICINE

## 2017-09-07 PROCEDURE — 12000000 ZZH R&B MED SURG/OB

## 2017-09-07 PROCEDURE — A9270 NON-COVERED ITEM OR SERVICE: HCPCS | Mod: GY | Performed by: FAMILY MEDICINE

## 2017-09-07 PROCEDURE — 25000128 H RX IP 250 OP 636: Performed by: FAMILY MEDICINE

## 2017-09-07 PROCEDURE — 82565 ASSAY OF CREATININE: CPT | Performed by: FAMILY MEDICINE

## 2017-09-07 PROCEDURE — 84132 ASSAY OF SERUM POTASSIUM: CPT | Performed by: FAMILY MEDICINE

## 2017-09-07 RX ORDER — WARFARIN SODIUM 5 MG/1
10 TABLET ORAL
Status: COMPLETED | OUTPATIENT
Start: 2017-09-07 | End: 2017-09-07

## 2017-09-07 RX ADMIN — TAZOBACTAM SODIUM AND PIPERACILLIN SODIUM 3.38 G: 375; 3 INJECTION, SOLUTION INTRAVENOUS at 03:06

## 2017-09-07 RX ADMIN — SIMVASTATIN 40 MG: 40 TABLET, FILM COATED ORAL at 21:18

## 2017-09-07 RX ADMIN — Medication 250 MG: at 08:53

## 2017-09-07 RX ADMIN — TAZOBACTAM SODIUM AND PIPERACILLIN SODIUM 4.5 G: 500; 4 INJECTION, SOLUTION INTRAVENOUS at 22:47

## 2017-09-07 RX ADMIN — ACETAMINOPHEN 650 MG: 325 TABLET, FILM COATED ORAL at 13:41

## 2017-09-07 RX ADMIN — OMEPRAZOLE 40 MG: 20 CAPSULE, DELAYED RELEASE ORAL at 06:09

## 2017-09-07 RX ADMIN — ENOXAPARIN SODIUM 100 MG: 100 INJECTION SUBCUTANEOUS at 17:05

## 2017-09-07 RX ADMIN — HYDROCHLOROTHIAZIDE 25 MG: 25 TABLET ORAL at 08:53

## 2017-09-07 RX ADMIN — Medication 250 MG: at 21:18

## 2017-09-07 RX ADMIN — METOPROLOL SUCCINATE 200 MG: 100 TABLET, EXTENDED RELEASE ORAL at 08:53

## 2017-09-07 RX ADMIN — WARFARIN SODIUM 10 MG: 5 TABLET ORAL at 17:59

## 2017-09-07 RX ADMIN — TAZOBACTAM SODIUM AND PIPERACILLIN SODIUM 4.5 G: 500; 4 INJECTION, SOLUTION INTRAVENOUS at 17:05

## 2017-09-07 RX ADMIN — ACETAMINOPHEN 650 MG: 325 TABLET, FILM COATED ORAL at 00:06

## 2017-09-07 RX ADMIN — VANCOMYCIN HYDROCHLORIDE 1750 MG: 1 INJECTION, POWDER, LYOPHILIZED, FOR SOLUTION INTRAVENOUS at 07:43

## 2017-09-07 RX ADMIN — TAZOBACTAM SODIUM AND PIPERACILLIN SODIUM 3.38 G: 375; 3 INJECTION, SOLUTION INTRAVENOUS at 10:44

## 2017-09-07 RX ADMIN — ENOXAPARIN SODIUM 100 MG: 100 INJECTION SUBCUTANEOUS at 05:24

## 2017-09-07 ASSESSMENT — PAIN DESCRIPTION - DESCRIPTORS: DESCRIPTORS: DULL;ACHING

## 2017-09-07 NOTE — PHARMACY
[facility]    Pharmacy    Antimicrobial Stewardship Note     Current antimicrobial therapy:  Anti-infectives (Future)    Start     Dose/Rate Route Frequency Ordered Stop    09/05/17 0715  piperacillin-tazobactam (ZOSYN) infusion 3.375 g      3.375 g  100 mL/hr over 30 Minutes Intravenous EVERY 6 HOURS 09/05/17 0712      09/03/17 1830  vancomycin (VANCOCIN) 1,750 mg in NaCl 0.9 % 500 mL intermittent infusion      1,750 mg  284 mL/hr over 2 Hours Intravenous EVERY 12 HOURS 09/03/17 1658          Indication: UTI, sepsis     Pertinent labs:  Creatinine   Creatinine   Date Value Ref Range Status   09/07/2017 1.16 0.66 - 1.25 mg/dL Final   09/06/2017 1.10 0.66 - 1.25 mg/dL Final   09/05/2017 1.08 0.66 - 1.25 mg/dL Final     WBC   WBC   Date Value Ref Range Status   09/06/2017 6.2 4.0 - 11.0 10e9/L Final   09/05/2017 8.1 4.0 - 11.0 10e9/L Final   09/04/2017 7.1 4.0 - 11.0 10e9/L Final     ANC No components found for: ANC     Culture Results:   Component Results   Component Collected Lab   Specimen Description 09/02/2017  3:15 PM HI   Blood   Special Requests 09/02/2017  3:15 PM HI   Right Arm   Culture Micro (Abnormal) 09/02/2017  3:15 PM HI   1 of 2 bottles   Enterococcus faecalis     Culture Micro 09/02/2017  3:15 PM HI   Critical Value:   Gram stain results of anaerobic bottle called to and read back by   Kellie Ramirez at 0646 on 09/03/17 by Cesar Ramirez     Culture & Susceptibility   ENTEROCOCCUS FAECALIS   Antibiotic Interpretation Sensitivity Unit Method Status   AMPICILLIN Sensitive <=2 ug/mL RENATA Preliminary   Gentamicin Screen Resistant       RENATA Preliminary   Comment: High level gentamicin resistance was found and this is predictive of   resistance to tobramycin and amikacin.   PENICILLIN Sensitive 1 ug/mL RENATA Preliminary   VANCOMYCIN Sensitive <=0.5 ug/mL RENATA Preliminary          Component Results   Component Collected Lab   Specimen Description 09/02/2017  3:24 PM HI   Midstream Urine   Culture  Micro (Abnormal) 09/02/2017  3:24 PM HI   >100,000 colonies/mL   Enterococcus faecalis     Culture Micro (Abnormal) 09/02/2017  3:24 PM HI   10,000 to 50,000 colonies/mL   Streptococcus viridans group   No further identification or sensitivity done     Culture & Susceptibility   ENTEROCOCCUS FAECALIS   Antibiotic Interpretation Sensitivity Unit Method Status   AMPICILLIN Sensitive <=2 ug/mL RENATA Final   NITROFURANTOIN Sensitive <=16 ug/mL RENATA Final   PENICILLIN Sensitive 2 ug/mL RENATA Final   VANCOMYCIN Sensitive 1 ug/mL RENATA Final                   Blood culture [[id number]] Collected: 09/05/17 0632         Order Status: Completed Specimen: Blood Updated: 09/06/17 0616           Specimen Description Blood Right Hand           Special Requests 10ML EACH           Culture Micro No growth after 23 hours         Blood culture [[id number]] Collected: 09/05/17 0628         Order Status: Completed Specimen: Blood Updated: 09/06/17 0616           Specimen Description Blood Right Arm           Special Requests 5ML AERO, 2ML ANO           Culture Micro No growth after 23 hours         Urine Culture Aerobic Bacterial [[id number]]           Order Status: No result Specimen: Urine            Recommendations/Interventions:  1. Feeling better but spiking fevers. Provider aware and will continue IV Zosyn/Vanco. Pharmacy will continue to monitor vancomycin and adjust accordingly. Zosyn dose adjusted to 4.5 g Q6H per [facility] renal dosing protocol and sepsis indication.     Calista Kovacs RPH  September 7, 2017

## 2017-09-07 NOTE — PLAN OF CARE
Face to face report given with opportunity to observe patient.  Report given to MAIRA Lowery.    Matias Villarreal  9/7/2017, 7:06 AM

## 2017-09-07 NOTE — PLAN OF CARE
"Problem: Goal Outcome Summary  Goal: Goal Outcome Summary  Outcome: No Change  Reason for hospital stay:  UTI Sepsis     Most recent vitals: /87 (BP Location: Right arm)  Temp 99.3  F (37.4  C) (Tympanic)  Resp 18  Ht 1.803 m (5' 11\")  Wt 114.2 kg (251 lb 12.3 oz)  SpO2 95%  BMI 35.11 kg/m2     Pain Management:  Patient c/o lower abdominal pain and requesting pain meds, States \"It might be the discs in my back and also maybe some constipation.\"- administered 650mg PO Tylenol at 1341.   Patient sleeping in recliner at 1425 with no observable s/s pain.      Orientation:  A+O x4     Cardiac:  Irregular apical pulse /100     Respiratory:  Dyspnea on exertion while walking. Lungs clear throughout and sats 95% on RA     GI:  Abdomen round, firm, and distended. Had 2 small BM's today. Tolerating regular diet and encouraged fluids and ambulation.     :  Up to bathroom to void - using urinal - c/o hesitancy - urine clear yellow     ABX:  Continues with IV Vanco and IV Zosyn     Mobility:  Up independently     Safety:  Alarms off - calls appropriately and has steady gait     Comments: Potassium 3.8 today - will continue to monitor, low grade temp 99.3 at 1340.     9/7/2017  2:58 PM  Sebastián Dotson           Problem: Urinary Tract Infection (Adult)  Goal: Signs and Symptoms of Listed Potential Problems Will be Absent or Manageable (Urinary Tract Infection)  Signs and symptoms of listed potential problems will be absent or manageable by discharge/transition of care (reference Urinary Tract Infection (Adult) CPG).   Outcome: No Change    09/07/17 0853   Urinary Tract Infection   Problems Assessed (Urinary Tract Infection) all   Problems Present (Urinary Tract Infection) none           "

## 2017-09-07 NOTE — PLAN OF CARE
Face to face report given with opportunity to observe patient.    Report given to MAIRA Dent.   Updated on patient pain, I&O, patient currently comfortable, resting in chair. Patient offers no complaints. IV Site patency checked WDL, free of redness, cool to touch, dry, no pain.    Paris Medina   9/6/2017  7:53 PM

## 2017-09-07 NOTE — PROGRESS NOTES
[facility]  Progress Note            Subjective:   Continues to feel better each day. Appetite good. Tolerated meal yesterday. Back pain still present. Did spike temp over 100 yesterday                 Medications:     Current Facility-Administered Medications Ordered in Epic   Medication Dose Route Frequency Last Rate Last Dose     hypromellose-dextran (ARTIFICAL TEARS) ophthalmic solution 1 drop  1 drop Both Eyes Q1H PRN         warfarin (COUMADIN) tablet 10 mg  10 mg Oral ONCE at 18:00         potassium chloride SA (K-DUR/KLOR-CON M) CR tablet 20-40 mEq  20-40 mEq Oral Q2H PRN   20 mEq at 09/06/17 1420     potassium chloride (KLOR-CON) Packet 20-40 mEq  20-40 mEq Oral or Feeding Tube Q2H PRN   20 mEq at 09/06/17 2201     potassium chloride 10 mEq in 100 mL intermittent infusion  10 mEq Intravenous Q1H PRN         potassium chloride 10 mEq in 100 mL intermittent infusion with 10 mg lidocaine  10 mEq Intravenous Q1H PRN         piperacillin-tazobactam (ZOSYN) infusion 3.375 g  3.375 g Intravenous Q6H 100 mL/hr at 09/07/17 0306 3.375 g at 09/07/17 0306     enoxaparin (LOVENOX) injection 100 mg  100 mg Subcutaneous Q12H   100 mg at 09/07/17 0524     Warfarin Therapy Reminder (Check START DATE - warfarin may be starting in the FUTURE)  1 each Does not apply Continuous PRN         hydrochlorothiazide (HYDRODIURIL) tablet 25 mg  25 mg Oral Daily   25 mg at 09/07/17 0853     metoprolol (TOPROL-XL) 24 hr tablet 200 mg  200 mg Oral Daily   200 mg at 09/07/17 0853     nitroGLYcerin (NITROSTAT) sublingual tablet 0.4 mg  0.4 mg Sublingual Q5 Min PRN         omeprazole (priLOSEC) CR capsule 40 mg  40 mg Oral QAM   40 mg at 09/07/17 0609     simvastatin (ZOCOR) tablet 40 mg  40 mg Oral At Bedtime   40 mg at 09/06/17 2200     naloxone (NARCAN) injection 0.1-0.4 mg  0.1-0.4 mg Intravenous Q2 Min PRN         lidocaine 1 % 1 mL  1 mL Other Q1H PRN         lidocaine (LMX4) kit   Topical Q1H PRN         sodium chloride  (PF) 0.9% PF flush 3 mL  3 mL Intracatheter Q1H PRN         sodium chloride (PF) 0.9% PF flush 3 mL  3 mL Intracatheter Q8H         acetaminophen (TYLENOL) tablet 650 mg  650 mg Oral Q4H PRN   650 mg at 09/07/17 0006     acetaminophen (TYLENOL) Suppository 650 mg  650 mg Rectal Q4H PRN         HYDROcodone-acetaminophen (NORCO) 5-325 MG per tablet 1-2 tablet  1-2 tablet Oral Q4H PRN   1 tablet at 09/05/17 0536     morphine (PF) injection 2-4 mg  2-4 mg Intravenous Q2H PRN         senna-docusate (SENOKOT-S;PERICOLACE) 8.6-50 MG per tablet 1-2 tablet  1-2 tablet Oral BID PRN         magnesium hydroxide (MILK OF MAGNESIA) suspension 30 mL  30 mL Oral Daily PRN         sodium phosphate (FLEET ENEMA) 1 enema  1 enema Rectal Once PRN         ondansetron (ZOFRAN-ODT) ODT tab 4 mg  4 mg Oral Q6H PRN        Or     ondansetron (ZOFRAN) injection 4 mg  4 mg Intravenous Q6H PRN         prochlorperazine (COMPAZINE) injection 5 mg  5 mg Intravenous Q6H PRN        Or     prochlorperazine (COMPAZINE) tablet 5 mg  5 mg Oral Q6H PRN        Or     prochlorperazine (COMPAZINE) Suppository 12.5 mg  12.5 mg Rectal Q12H PRN         oxymetazoline (AFRIN) 0.05 % spray 2 spray  2 spray Both Nostrils BID         vancomycin (VANCOCIN) 1,750 mg in NaCl 0.9 % 500 mL intermittent infusion  1,750 mg Intravenous Q12H 284 mL/hr at 09/07/17 0743 1,750 mg at 09/07/17 0743     saccharomyces boulardii (FLORASTOR) capsule 250 mg  250 mg Oral BID   250 mg at 09/07/17 0853     ibuprofen (ADVIL/MOTRIN) tablet 200 mg  200 mg Oral Q6H PRN   200 mg at 09/05/17 1409     No current Epic-ordered outpatient prescriptions on file.       Review of Systems:   C: as above  E/M: NEGATIVE for ear, mouth and throat problems  R: NEGATIVE for significant cough or SOB  CV: NEGATIVE for chest pain, palpitations or peripheral edema               Physical Exam:   Vitals were reviewed  Patient Vitals for the past 24 hrs:   BP Temp Temp src Heart Rate Resp SpO2 Weight   09/07/17  0746 148/100 97.7  F (36.5  C) Tympanic 100 16 95 % -   09/07/17 0607 - - - - - - 251 lb 12.3 oz (114.2 kg)   09/07/17 0308 143/91 98.3  F (36.8  C) Tympanic 98 16 95 % -   09/07/17 0002 145/91 100.2  F (37.9  C) Tympanic 99 16 95 % -   09/06/17 2205 - 99.9  F (37.7  C) Tympanic - - - -   09/06/17 1948 - 99.9  F (37.7  C) Tympanic - - - -   09/06/17 1550 127/89 99.4  F (37.4  C) Tympanic 87 21 96 % -   09/06/17 1100 146/93 96  F (35.6  C) Tympanic 84 19 95 % -     Constitutional: Awake, alert, cooperative.  Eyes:  sclera clear  ENT: no oral lesions  Lungs: No increased work of breathing, good air exchange, clear to auscultation bilaterally, no crackles or wheezing.  Cardiovascular: irregular  Abdomen: soft  Neurologic: Awake, alert, oriented to name, place and time.    Neuropsychiatric: Normal affect, mood, orientation, memory and insight.  Skin: No rashes      Peripheral IV 09/03/17 Left (Active)   Site Assessment WDL 9/7/2017  3:11 AM   Line Status Infusing;Checked every 1-2 hour 9/7/2017  3:11 AM   Phlebitis Scale 0-->no symptoms 9/7/2017  3:11 AM   Infiltration Scale 0 9/7/2017  3:11 AM   Infiltration Site Treatment Method  None 9/5/2017  4:00 PM   Extravasation? No 9/3/2017  4:19 PM   Dressing Intervention New dressing  9/3/2017  2:00 PM   Number of days:4     Line/device assessment(s) completed for medical necessity         Data:     Results for orders placed or performed during the hospital encounter of 09/03/17 (from the past 24 hour(s))   Potassium   Result Value Ref Range    Potassium 3.3 (L) 3.4 - 5.3 mmol/L   Potassium   Result Value Ref Range    Potassium 3.8 3.4 - 5.3 mmol/L   Creatinine   Result Value Ref Range    Creatinine 1.16 0.66 - 1.25 mg/dL    GFR Estimate 63 >60 mL/min/1.7m2    GFR Estimate If Black 76 >60 mL/min/1.7m2   INR   Result Value Ref Range    INR 1.38 (H) 0.80 - 1.20     All cardiac studies reviewed by me.  All imaging studies reviewed by me.         Assessment and Plan:   Principal  Problem:    Sepsis due to Enterococcus (H)  Cont iv zosyn/vanco. Anticipate discharge iin 1-2 days  Active Problems:    Atrial fibrillation (H)   inr still subtherap. Will increase dose coumadin today    Urinary tract infection, site not specified   as above    Benign essential HTN  bp elevated in hospital but typically is good as outpt

## 2017-09-07 NOTE — PLAN OF CARE
Problem: Goal Outcome Summary  Goal: Goal Outcome Summary  Outcome: Improving  VSS, A & O, ambulating independently in room. Highest temp during shift 99.9, no rigors. Receiving IV zosyn and vanco. +2 edema present below knees BLE. Lungs clear. Ambulated in hallway.  Nursing student assisting with cares from 1500 to 2000. Total of 60 meq of K given per protocol, redraw timed for 0200.     Problem: Urinary Tract Infection (Adult)  Goal: Signs and Symptoms of Listed Potential Problems Will be Absent or Manageable (Urinary Tract Infection)  Signs and symptoms of listed potential problems will be absent or manageable by discharge/transition of care (reference Urinary Tract Infection (Adult) CPG).     09/06/17 2043   Urinary Tract Infection   Problems Assessed (Urinary Tract Infection) all   Problems Present (Urinary Tract Infection) fluid and electrolyte imbalance;pain         Face to face report given with opportunity to observe patient.    Report given to MAIRA Salcedo   9/6/2017  11:20 PM

## 2017-09-07 NOTE — PLAN OF CARE
Problem: Goal Outcome Summary  Goal: Goal Outcome Summary  Outcome: No Change  Patient alert/oriented, up to bathroom IND. Having low grade temperature prn oral tylenol used with effect. IV zosyn given this shift. Having plus 2 edema LE bilat. Potassium being monitored.     Problem: Urinary Tract Infection (Adult)  Goal: Signs and Symptoms of Listed Potential Problems Will be Absent or Manageable (Urinary Tract Infection)  Signs and symptoms of listed potential problems will be absent or manageable by discharge/transition of care (reference Urinary Tract Infection (Adult) CPG).   Outcome: No Change    09/07/17 0512   Urinary Tract Infection   Problems Assessed (Urinary Tract Infection) pain

## 2017-09-08 VITALS
BODY MASS INDEX: 35.25 KG/M2 | WEIGHT: 251.77 LBS | SYSTOLIC BLOOD PRESSURE: 145 MMHG | RESPIRATION RATE: 18 BRPM | OXYGEN SATURATION: 94 % | HEIGHT: 71 IN | DIASTOLIC BLOOD PRESSURE: 85 MMHG | TEMPERATURE: 97.6 F

## 2017-09-08 LAB
BACTERIA SPEC CULT: ABNORMAL
BACTERIA SPEC CULT: ABNORMAL
BACTERIA SPEC CULT: NORMAL
CREAT SERPL-MCNC: 3.07 MG/DL (ref 0.66–1.25)
GFR SERPL CREATININE-BSD FRML MDRD: 20 ML/MIN/1.7M2
INR PPP: 2.1 (ref 0.8–1.2)
Lab: ABNORMAL
SPECIMEN SOURCE: ABNORMAL
SPECIMEN SOURCE: NORMAL

## 2017-09-08 PROCEDURE — 36415 COLL VENOUS BLD VENIPUNCTURE: CPT | Performed by: FAMILY MEDICINE

## 2017-09-08 PROCEDURE — 85610 PROTHROMBIN TIME: CPT | Performed by: FAMILY MEDICINE

## 2017-09-08 PROCEDURE — 82565 ASSAY OF CREATININE: CPT | Performed by: FAMILY MEDICINE

## 2017-09-08 PROCEDURE — 25000132 ZZH RX MED GY IP 250 OP 250 PS 637: Mod: GY | Performed by: FAMILY MEDICINE

## 2017-09-08 PROCEDURE — 25000128 H RX IP 250 OP 636: Performed by: FAMILY MEDICINE

## 2017-09-08 PROCEDURE — A9270 NON-COVERED ITEM OR SERVICE: HCPCS | Mod: GY | Performed by: FAMILY MEDICINE

## 2017-09-08 RX ORDER — NITROFURANTOIN 25; 75 MG/1; MG/1
100 CAPSULE ORAL 2 TIMES DAILY
Qty: 20 CAPSULE | Refills: 0 | Status: SHIPPED | OUTPATIENT
Start: 2017-09-08 | End: 2017-09-18

## 2017-09-08 RX ORDER — SACCHAROMYCES BOULARDII 250 MG
250 CAPSULE ORAL 2 TIMES DAILY
Qty: 60 CAPSULE | Refills: 0 | Status: SHIPPED | OUTPATIENT
Start: 2017-09-08 | End: 2019-06-24

## 2017-09-08 RX ADMIN — ENOXAPARIN SODIUM 100 MG: 100 INJECTION SUBCUTANEOUS at 05:52

## 2017-09-08 RX ADMIN — OMEPRAZOLE 40 MG: 20 CAPSULE, DELAYED RELEASE ORAL at 07:08

## 2017-09-08 RX ADMIN — ACETAMINOPHEN 650 MG: 325 TABLET, FILM COATED ORAL at 02:25

## 2017-09-08 RX ADMIN — HYDROCHLOROTHIAZIDE 25 MG: 25 TABLET ORAL at 08:57

## 2017-09-08 RX ADMIN — Medication 250 MG: at 08:57

## 2017-09-08 RX ADMIN — SENNOSIDES AND DOCUSATE SODIUM 1 TABLET: 8.6; 5 TABLET ORAL at 02:18

## 2017-09-08 RX ADMIN — METOPROLOL SUCCINATE 200 MG: 100 TABLET, EXTENDED RELEASE ORAL at 08:57

## 2017-09-08 RX ADMIN — VANCOMYCIN HYDROCHLORIDE 2000 MG: 1 INJECTION, POWDER, LYOPHILIZED, FOR SOLUTION INTRAVENOUS at 02:19

## 2017-09-08 RX ADMIN — TAZOBACTAM SODIUM AND PIPERACILLIN SODIUM 4.5 G: 500; 4 INJECTION, SOLUTION INTRAVENOUS at 05:50

## 2017-09-08 NOTE — PROVIDER NOTIFICATION
Dr. Hunt notified of pt's vanco level of 28.9.   Received telephone orders to hold PM dose (pharmacy already had) and to recheck vanco level again in the morning before administering AM dose

## 2017-09-08 NOTE — PROGRESS NOTES
[facility]  Progress Note            Subjective:   Had large bm this am and feeling much better. No chills/rigors. No nausea. Appetite good                 Medications:     Current Facility-Administered Medications Ordered in Epic   Medication Dose Route Frequency Last Rate Last Dose     hypromellose-dextran (ARTIFICAL TEARS) ophthalmic solution 1 drop  1 drop Both Eyes Q1H PRN         piperacillin-tazobactam (ZOSYN) intermittent infusion 4.5 g  4.5 g Intravenous Q6H 200 mL/hr at 09/08/17 0550 4.5 g at 09/08/17 0550     vancomycin (VANCOCIN) 2,000 mg in NaCl 0.9 % 500 mL intermittent infusion  2,000 mg Intravenous Q18H 285 mL/hr at 09/08/17 0219 2,000 mg at 09/08/17 0219     potassium chloride SA (K-DUR/KLOR-CON M) CR tablet 20-40 mEq  20-40 mEq Oral Q2H PRN   20 mEq at 09/06/17 1420     potassium chloride (KLOR-CON) Packet 20-40 mEq  20-40 mEq Oral or Feeding Tube Q2H PRN   20 mEq at 09/06/17 2201     potassium chloride 10 mEq in 100 mL intermittent infusion  10 mEq Intravenous Q1H PRN         potassium chloride 10 mEq in 100 mL intermittent infusion with 10 mg lidocaine  10 mEq Intravenous Q1H PRN         enoxaparin (LOVENOX) injection 100 mg  100 mg Subcutaneous Q12H   100 mg at 09/07/17 1705     Warfarin Therapy Reminder (Check START DATE - warfarin may be starting in the FUTURE)  1 each Does not apply Continuous PRN         hydrochlorothiazide (HYDRODIURIL) tablet 25 mg  25 mg Oral Daily   25 mg at 09/07/17 0853     metoprolol (TOPROL-XL) 24 hr tablet 200 mg  200 mg Oral Daily   200 mg at 09/07/17 0853     nitroGLYcerin (NITROSTAT) sublingual tablet 0.4 mg  0.4 mg Sublingual Q5 Min PRN         omeprazole (priLOSEC) CR capsule 40 mg  40 mg Oral QAM   40 mg at 09/07/17 0609     simvastatin (ZOCOR) tablet 40 mg  40 mg Oral At Bedtime   40 mg at 09/07/17 2118     naloxone (NARCAN) injection 0.1-0.4 mg  0.1-0.4 mg Intravenous Q2 Min PRN         lidocaine 1 % 1 mL  1 mL Other Q1H PRN         lidocaine  (LMX4) kit   Topical Q1H PRN         sodium chloride (PF) 0.9% PF flush 3 mL  3 mL Intracatheter Q1H PRN         sodium chloride (PF) 0.9% PF flush 3 mL  3 mL Intracatheter Q8H         acetaminophen (TYLENOL) tablet 650 mg  650 mg Oral Q4H PRN   650 mg at 09/08/17 0225     acetaminophen (TYLENOL) Suppository 650 mg  650 mg Rectal Q4H PRN         HYDROcodone-acetaminophen (NORCO) 5-325 MG per tablet 1-2 tablet  1-2 tablet Oral Q4H PRN   1 tablet at 09/05/17 0536     morphine (PF) injection 2-4 mg  2-4 mg Intravenous Q2H PRN         senna-docusate (SENOKOT-S;PERICOLACE) 8.6-50 MG per tablet 1-2 tablet  1-2 tablet Oral BID PRN   1 tablet at 09/08/17 0218     magnesium hydroxide (MILK OF MAGNESIA) suspension 30 mL  30 mL Oral Daily PRN         sodium phosphate (FLEET ENEMA) 1 enema  1 enema Rectal Once PRN         ondansetron (ZOFRAN-ODT) ODT tab 4 mg  4 mg Oral Q6H PRN        Or     ondansetron (ZOFRAN) injection 4 mg  4 mg Intravenous Q6H PRN         prochlorperazine (COMPAZINE) injection 5 mg  5 mg Intravenous Q6H PRN        Or     prochlorperazine (COMPAZINE) tablet 5 mg  5 mg Oral Q6H PRN        Or     prochlorperazine (COMPAZINE) Suppository 12.5 mg  12.5 mg Rectal Q12H PRN         oxymetazoline (AFRIN) 0.05 % spray 2 spray  2 spray Both Nostrils BID         saccharomyces boulardii (FLORASTOR) capsule 250 mg  250 mg Oral BID   250 mg at 09/07/17 2118     ibuprofen (ADVIL/MOTRIN) tablet 200 mg  200 mg Oral Q6H PRN   200 mg at 09/05/17 1409     No current Epic-ordered outpatient prescriptions on file.       Review of Systems:   C: NEGATIVE for fever, chills, change in weight  E/M: NEGATIVE for ear, mouth and throat problems  R: NEGATIVE for significant cough or SOB  CV: NEGATIVE for chest pain, palpitations or peripheral edema               Physical Exam:   Vitals were reviewed  Patient Vitals for the past 24 hrs:   BP Temp Temp src Heart Rate Resp SpO2 Weight   09/08/17 0215 - 98.8  F (37.1  C) Tympanic - 16 - -    09/08/17 0019 157/82 99.8  F (37.7  C) Tympanic 93 16 95 % -   09/07/17 2251 - 100.7  F (38.2  C) Tympanic - - - -   09/07/17 2118 - 101.1  F (38.4  C) Tympanic - - - -   09/07/17 1701 153/83 99.7  F (37.6  C) Tympanic 98 - 96 % -   09/07/17 1332 163/87 99.3  F (37.4  C) Tympanic 91 18 95 % -   09/07/17 0746 148/100 97.7  F (36.5  C) Tympanic 100 16 95 % -   09/07/17 0607 - - - - - - 251 lb 12.3 oz (114.2 kg)     Constitutional: Awake, alert, cooperative.  Eyes:  sclera clear  Lungs: No increased work of breathing, good air exchange, clear to auscultation bilaterally, no crackles or wheezing.  Cardiovascular: irregular  Abdomen: overweight soft.  Neurologic: Awake, alert, oriented to name, place and time.    Neuropsychiatric: Normal affect, mood, orientation, memory and insight.  Skin: No rashes      Peripheral IV 09/03/17 Left (Active)   Site Assessment WDL 9/8/2017 12:35 AM   Line Status Saline locked 9/8/2017 12:35 AM   Phlebitis Scale 0-->no symptoms 9/8/2017 12:35 AM   Infiltration Scale 0 9/8/2017 12:35 AM   Infiltration Site Treatment Method  None 9/7/2017  5:15 PM   Extravasation? No 9/3/2017  4:19 PM   Dressing Intervention New dressing  9/3/2017  2:00 PM   Number of days:5     Plan to discontinue (See Orders)         Data:     Results for orders placed or performed during the hospital encounter of 09/03/17 (from the past 24 hour(s))   Vancomycin level   Result Value Ref Range    Vancomycin Level 28.9 (HH) mg/L     All cardiac studies reviewed by me.  All imaging studies reviewed by me.         Assessment and Plan:   Principal Problem:    Sepsis due to Enterococcus (H)  Cr increased. Will stop vanco, zosyn,lovenox. Can be discharged but with close f/u bmet on Monday. Push fluids over wknd.  afib  Rate controlled. Home on coumadin      Urinary tract infection, site not specified  As above    Benign essential HTN  bp stable. Have stopped hctz. Recheck bp monday

## 2017-09-08 NOTE — PROVIDER NOTIFICATION
Telephone call to Dr. Gray to verify if INR should be rechecked sooner than F/U appt with her 9/18 d/t pt being discharged on abx and also if she wants pt to receive 1100 dose of abx prior to d/c. Per Dr. Alie Guevara recheck INR anytime next week at clinic and then she will also check at follow up appointment. Pt does not need 1100 dose of abx prior to discharge. MAIRA Lowery notified.

## 2017-09-08 NOTE — PLAN OF CARE
"Problem: Goal Outcome Summary  Goal: Goal Outcome Summary  Outcome: Adequate for Discharge Date Met:  09/08/17  Reason for hospital stay:  UTI Sepsis     Most recent vitals: /85 (BP Location: Right arm)  Temp 97.6  F (36.4  C) (Tympanic)  Resp 18  Ht 1.803 m (5' 11\")  Wt 114.2 kg (251 lb 12.3 oz)  SpO2 94%  BMI 35.11 kg/m2     Pain Management:  Denied any pain this shift     Orientation:  A+O x4     Cardiac:  Irregular apical pulse     Respiratory:  Lungs clear throughout - sats 94% RA - encouraged coughing and deep breathing     GI:  BS present x4 - had large BM this morning     :  C/o hesitancy - voiding in bathroom and in urinal      ABX:  Discharging home on Macrobid     Mobility:  Up independently with steady gait        Patient discharged at 10:47 AM via ambulation accompanied by sister and staff. Prescriptions sent to patients preferred pharmacy. All belongings sent with patient.      Discharge instructions reviewed with patient. Listed belongings gathered and returned to patient. YES     Patient discharged to home.      Core Measures and Vaccines  Core Measures applicable during stay: YES. If yes, state diagnosis: Sepsis  Pneumonia and Influenza given prior to discharge, if indicated: N/A - Up to date     MISC  Follow up appointment made:  Yes  Home and hospital aquired medications returned to patient: Yes  Patient reports pain was well managed at discharge: Yes      "

## 2017-09-08 NOTE — PROGRESS NOTES
Final blood culture report routed to Dr Ramos and reviewed with [facility] pharmacist whom states the antibiotics pt is currently on as an in-patient will cover this organism and that this is being followed by pharmacy as well as Dr. Ramos.

## 2017-09-08 NOTE — PROGRESS NOTES
Name: Dakota Gaston    MRN#: [id number]    Reason for Hospitalization: Positive blood culture [R78.81]  Acute maxillary sinusitis, recurrence not specified [J01.00]  Urinary tract infection with hematuria, site unspecified [N39.0, R31.9]    ALONA: average    Discharge Date: 9/8/2017    Patient / Family response to discharge plan: understands and agrees    Follow-Up Appt: No future appointments.    Other Providers (Care Coordinator, County Services, PCA services etc): No    Discharge Disposition: home via sister    Umu Lindquist RN

## 2017-09-08 NOTE — DISCHARGE SUMMARY
DISCHARGE DIAGNOSES:   1.  Sepsis secondary to enterococcus from urinary tract infection.   2.  Atrial fibrillation on chronic anticoagulation.   3.  End-stage osteoarthritis, recently scheduled for a total knee replacement.   4.  Hypertension.   5.  Hyperlipidemia.   6.  History of gastroesophageal reflux disease.      DISCHARGE MEDICATIONS:     1.  Coumadin.   2.  Macrobid 100 mg b.i.d.   3.  Toprol- mg daily.   4.  Omeprazole 40 mg daily.   5.  Simvastatin 40 mg at night.   6.  Tylenol as needed.   7.  Norco as needed.      PROCEDURES PERFORMED AND FINDINGS:  CT scan showed findings consistent with pyelonephritis of the left kidney, no hydronephrosis or masses seen.  CT scan of the sinuses showed opacified right ethmoid ER sinuses posteriorly.  A chest x-ray that showed no acute findings.      DISCHARGE DIET:  Two gram sodium diet.      DISCHARGE ACTIVITY:  As tolerated.  Followup scheduled with myself early next week for a repeat metabolic panel and INR.  Also, he will be seen within 10 days after the completion of b.i.d. for another urinalysis to make sure that the infection has resolved.        DISCHARGE PHYSICAL:  Please see note dated 09/08/2017.      HOSPITAL COURSE:  A 66-year-old male that started developing increasing fever and rigors at home.  Dakota Gaston was seen in the emergency room and was discharged to home but he came back and was diagnosed with sepsis.  Cultures showed enterococcus.  He was put on vancomycin and Zosyn and did well but it took several days for the patient's fever to defervesce.  He was still running a low-grade.  Unfortunately temperatures were checked by nursing staff tympanically rather than orally so I do not trust the validity of these readings.  The patient was completely asymptomatic on discharge.  Unfortunately he had dosed from pharmacy the vancomycin and his trough level was quite high.  That was discontinued but it caused an increase in his creatinine from 1.1  to 3.0 on discharge.  The patient would not be receiving on discharge vancomycin, Zosyn or Lovenox.  He was encouraged to push fluids.  He will avoid anti-inflammatories.  He will take Macrobid for the UTI.  We will recheck his creatinine next week, and he will push plenty of fluids.  He was subtherapeutic on his INR on discharge and again this will be rechecked.  He was holding his Coumadin prior to the admission because he was awaiting his total knee replacement.         DEMAR ROLAND MD             D: 2017 11:27   T: 2017 11:48   MT: [place]      Name:     TASIA RAZO   MRN:      [id number]        Account:        [id number]   :      1950           Admit Date:                                       Discharge Date: 2017      Document: [id number]

## 2017-09-08 NOTE — PLAN OF CARE
Problem: Goal Outcome Summary  Goal: Goal Outcome Summary  Outcome: Improving  Pt A&O x3. Highest temp this shift 99.8, last checked 97.5. He c/o of lower abdominal pain which he associates partly with his back problems but also states he has only has small bowel movements and it feels dull, aching with the pressure of having to have a BM. One tablet PRN senna given this shift with tylenol for pain. He reports the tylenol relieves the pain. Bowel sounds are active. Lung sounds are clear. IV saline. Vanco and zosyn continued. Pt voiding adequately. Pt up independently in room and calls appropriately.     Face to face report given with opportunity to observe patient.  Report given to Sebastián RN and MAIRA Kaufman.    Sharon Brown  9/8/2017, 7:51 AM

## 2017-09-08 NOTE — PHARMACY-VANCOMYCIN DOSING SERVICE
Pharmacy Vancomycin Note  Date of Service 2017  Patient's  1950   66 year old, male    Indication: Urinary Tract Infection, with sepsis due to enterococcus  Goal Trough Level: 15-20 mg/L  Day of Therapy: 5  Current Vancomycin regimen:  1750 mg IV q12h    Current estimated CrCl = Estimated Creatinine Clearance: 79.5 mL/min (based on Cr of 1.16).    Creatinine for last 3 days  2017:  5:26 AM Creatinine 1.08 mg/dL  2017:  5:22 AM Creatinine 1.10 mg/dL  2017:  5:50 AM Creatinine 1.16 mg/dL    Recent Vancomycin Levels (past 3 days)  2017:  5:26 AM Vancomycin Level 15.7 mg/L  2017:  6:30 PM Vancomycin Level 28.9 mg/L    Vancomycin IV Administrations (past 72 hours)                   vancomycin (VANCOCIN) 1,750 mg in NaCl 0.9 % 500 mL intermittent infusion (mg) 1,750 mg New Bag 17 0743     1,750 mg New Bag 17 1944     1,750 mg New Bag  0634     1,750 mg New Bag 17 1926     1,750 mg New Bag  0737                Nephrotoxins and other renal medications (Future)    Start     Dose/Rate Route Frequency Ordered Stop    17 0200  vancomycin (VANCOCIN) 2,000 mg in NaCl 0.9 % 500 mL intermittent infusion      2,000 mg  285 mL/hr over 2 Hours Intravenous EVERY 18 HOURS 17 1924      17 1700  piperacillin-tazobactam (ZOSYN) intermittent infusion 4.5 g      4.5 g  200 mL/hr over 30 Minutes Intravenous EVERY 6 HOURS 17 1115      17 2321  ibuprofen (ADVIL/MOTRIN) tablet 200 mg      200 mg Oral EVERY 6 HOURS PRN 17 2321      17 1830  [Rx hold ]  vancomycin (VANCOCIN) 1,750 mg in NaCl 0.9 % 500 mL intermittent infusion     (Rx hold  since 17 1800)    1,750 mg  284 mL/hr over 2 Hours Intravenous EVERY 12 HOURS 17 1658               Contrast Orders - past 72 hours     None          Interpretation of levels and current regimen:  Trough level is  Supratherapeutic    Has serum creatinine changed > 50% in last 72 hours: No    Renal  Function: Stable    Plan:  1.  Decrease Dose to 2000 mg IV every 18 hours  2.  Pharmacy will check trough levels as appropriate in 3-5 Days.    3. Serum creatinine levels will be ordered daily for the first week of therapy and at least twice weekly for subsequent weeks.      Lilia Kate        .

## 2017-09-08 NOTE — PHARMACY-VANCOMYCIN DOSING SERVICE
Patient's SCr increased to 3.07 today, reducing estimated CrCL to about 30 mL/min.  Dose of vancomycin was further decreased to 1750 mg IV every 36 hours.  This is likely form the combination of two nephrotoxins--vancomycin and Zosyn.  This should resolve with discontinuation of one or both of these medications.    Stefany Joaquin, PharmD  September 8, 2017

## 2017-09-08 NOTE — DISCHARGE INSTRUCTIONS
What to expect when you have contrast    During your exam, we will inject  contrast  into your vein or artery. (Contrast is a clear liquid with iodine in it. It shows up on X-rays.)    You may feel warm or hot. You may have a metal taste in your mouth and a slight upset stomach. You may also feel pressure near the kidneys and bladder. These effects will last about 1 to 3 minutes.    Please tell us if you have:    Sneezing     Itching    Hives     Swelling in the face    A hoarse voice    Breathing problems    Other new symptoms    Serious problems are rare.  They may include:    Irregular heartbeat     Seizures    Kidney failure              Tissue damage    Shock      Death    If you have any problems during the exam, we  will treat them right away.    When you get home    Call your hospital if you have any new symptoms in the next 2 days, like hives or swelling. (Phone numbers are at the bottom of this page.) Or call your family doctor.     If you have wheezing or trouble breathing, call 911.    Self-care  -Drink at least 4 extra glasses of water today.   This reduces the stress on your kidneys.  -Keep taking your regular medicines.    The contrast will pass out of your body in your  Urine(pee). This will happen in the next 24 hours. You  will not feel this. Your urine will not  change color.    If you have kidney problems or take metformin    Drink 4 to 8 large glasses of water for the next  2 days, if you are not on a fluid restriction.    ?If you take metformin (Glucophage or Glucovance) for diabetes, keep taking it.      ?Your kidney function tests are abnormal.  If you take Metformin, do not take it for 48 hours. Please go to your clinic for a blood test within 3 days after your exam before the restarting this medicine.     (Note to provider:please give patient prescription for lab tests.)    ?Special instructions: Drink an extra 4 glasses of water to flush out the contrast.     I have read and understand the  above information.    Patient Sign Here:______________________________________Date:________Time:______    Staff Sign Here:________________________________________Date:_______Time:______      Radiology Departments:     ?[facility]: [phone] ?[place]: [phone]     ?[place]: [phone] ?[facility]:[phone]      ?[place]: [phone]  ?[place]: [phone]  ?[place]:[phone]    ?[facility]:[phone]  ?[facility]:[phone]  You have a lab appointment on Monday September 11, 2017 at 11:00 am to recheck your INR at the [facility].       You have a follow up appointment scheduled with Dr. Gray on Monday September 18, 2017 at 9:30am at the [facility]. If you have any questions regarding these appointments please call the clinic at [phone].

## 2017-09-08 NOTE — PLAN OF CARE
Problem: Goal Outcome Summary  Goal: Goal Outcome Summary  Outcome: No Change  VSS except for temp, 101.1 tympanic highest during shift. Ice pack applied to back of neck for comfort. Edema decreased since yesterday, +1 BLE. Lung sounds clear, dyspnea on exertion. Abd firm and rounded, several small BM's during shift, c/o discomfort but declines intervention. Ambulating independently in room. Receiving IV vanco and zosyn, IV SL otherwise.   Face to face report given with opportunity to observe patient.     Report given to MAIRA Matthews   9/7/2017  11:29 PM        Problem: Urinary Tract Infection (Adult)  Goal: Signs and Symptoms of Listed Potential Problems Will be Absent or Manageable (Urinary Tract Infection)  Signs and symptoms of listed potential problems will be absent or manageable by discharge/transition of care (reference Urinary Tract Infection (Adult) CPG).     09/07/17 3928   Urinary Tract Infection   Problems Assessed (Urinary Tract Infection) all   Problems Present (Urinary Tract Infection) none

## 2017-09-11 ENCOUNTER — TELEPHONE (OUTPATIENT)
Dept: CASE MANAGEMENT | Facility: HOSPITAL | Age: 67
End: 2017-09-11

## 2017-09-11 LAB
BACTERIA SPEC CULT: NORMAL
BACTERIA SPEC CULT: NORMAL
Lab: NORMAL
Lab: NORMAL
SPECIMEN SOURCE: NORMAL
SPECIMEN SOURCE: NORMAL

## 2017-09-12 ENCOUNTER — TELEPHONE (OUTPATIENT)
Dept: CASE MANAGEMENT | Facility: HOSPITAL | Age: 67
End: 2017-09-12

## 2017-09-12 NOTE — TELEPHONE ENCOUNTER
Dakota Gaston, was discharged to home on 9/8/17   from [facility]. I spoke today with Melo  regarding his  discharge.   He indicates he  receive(d) sufficient information upon discharge. Medications were reviewed in full on discharge, including: Medications to be started; medications to be stopped; medications to be continued from preadmission and any side effects. Prescriptions were received at discharge and were able to be filled. Medications are being taken as prescribed.   He  indicates he has  an appointment scheduled for Sept 15  and has  transportation available. He was  able to confirm his  appointment time and has  it written down.  He  did not have any questions regarding discharge instructions or condition.  Per their request, the following employee (s) can be recognized for their outstanding services delivered:  Care was excellent.  Suggestions to improve service: nothing indicated   He as informed he  may receive a survey in the mail from the hospital. Asked if he  would kindly complete the survey in order for [facility] to know if services fully met patient needs.

## 2018-07-16 ENCOUNTER — HOSPITAL ENCOUNTER (OUTPATIENT)
Facility: HOSPITAL | Age: 68
End: 2018-07-16
Attending: INTERNAL MEDICINE | Admitting: INTERNAL MEDICINE
Payer: MEDICARE

## 2018-12-04 DIAGNOSIS — Z95.820 S/P ANGIOPLASTY WITH STENT: Primary | ICD-10-CM

## 2018-12-13 ENCOUNTER — HOSPITAL ENCOUNTER (OUTPATIENT)
Dept: CARDIAC REHAB | Facility: HOSPITAL | Age: 68
Setting detail: THERAPIES SERIES
End: 2018-12-13
Attending: INTERNAL MEDICINE
Payer: MEDICARE

## 2018-12-13 VITALS — BODY MASS INDEX: 35 KG/M2 | WEIGHT: 250 LBS | HEIGHT: 71 IN

## 2018-12-13 DIAGNOSIS — Z95.820 S/P ANGIOPLASTY WITH STENT: ICD-10-CM

## 2018-12-13 PROCEDURE — 93798 PHYS/QHP OP CAR RHAB W/ECG: CPT

## 2018-12-13 PROCEDURE — 40000116 ZZH STATISTIC OP CR VISIT

## 2018-12-13 ASSESSMENT — MIFFLIN-ST. JEOR: SCORE: 1921.12

## 2018-12-13 ASSESSMENT — 6 MINUTE WALK TEST (6MWT)
TOTAL DISTANCE WALKED (FT): 1294
MALE CALC: 1672.12
FEMALE CALC: 1274.18
PREDICTED: 1682.32
GENDER SELECTION: MALE

## 2018-12-13 NOTE — PROGRESS NOTES
12/13/18 0800   Session   Session Initial Evaluation and Exercise Prescription   Certified through this date 01/11/19   Cardiac Rehab Assessment   Cardiac Rehab Assessment 12/13: Pt has initiated Phase II rehab on Dec 13th, 2018 following discharge from [facility] on November 28th, 2018 s/p DESx1 to LAD. Pt was seen by PCP following 2 months of chest discomfort and SOB before addressing concerns with a physician. Pt was attributing changes of breath with pleurisy, which was a diagnosis in 2003 but PCP decided to follow up with cardiology for possible cardiac concerns. Cardiology decided to forgo a stress test and scheduled pt for an angioplasty. During procedure cardiac surgeon placed DESx1 to a previously stented vessel which resolved pt's symptoms almost immediately.    Pt participates in aerobic exercise during the summer months, walking 3-4 d/week with a duration of 40-45 minutes. During the winter months, pt participates in league bowling but does not participate in aerobic exercise. Pt is eager to attend exercise sessions 3d/week to regain previous stamina and hopefully decrease overall body weight.    Pt has been informed about educational classes and has been encouraged to attend sessions that are relevant to pt's personal risk factors. Staff discussed handouts would be provided it pt was unable to attend sessions presented by staff.     The patient's history and clinical status including hemodynamics and ECG were evaluated. The patient was assessed to be stable and appropriate to begin exercise.   The patient's functional capacity and exercise prescription were determined by the completion of the 6 minute walk test. See results above. The patient was oriented to the program.  Risk factor profile was completed. Goals and objectives were discussed. CV response was WNL. No symptoms, complaints or pain were reported. Good prognosis for reaching goals below. Skilled therapy is necessary in order to monitor CV  "response to exercise, to provide education on risk factors and behavior change counseling needed to achieve patient's goals.  Plan to progress to 30-40 minutes of exercise prior to discharge from cardiac rehab.  Initial THR of 20-30 beats above RHR; Effort rating of 4-6. Initiate muscle conditioning as appropriate. Provide risk factor education and behavior change counseling.    General Information   Treatment Diagnosis Stent   Date of Treatment Diagnosis 11/27/18   Significant Past CV History Previous PCI;Previous MI  (Stent (2003, 2010); MI 2003)   Comorbidities Previous MI   Other Medical History Tubular adenomas; GERD; Cervical spondylosis w/fusion of discs; A-fib; Sleep apnea; Full Knee Replacement   Lead up symptoms 12/13: Pt was experiencing burning sensation that pt attributed to pleurisy, to which pt was treated for in 2003. Pt noticed symptoms would occur when they were in the \"open air\" and occasionally SOB would be experienced when positional changes occurred when tying their shoes. Symptoms would resolve following rest between 5-20 minutes. Finally, pt made apt with PCP after 2 months to which angioplasty was recommended.    Hospital Location [facility] Discharge Date 11/28/18   Signs and Symptoms Post Hospital Discharge Other (see comments)  (Upon d/c pt would experience spike in HR when bending. )   Comments 12/13: On discharge day pt was in the process of placing slippers on feet when medical staff ran into room on two occasions. Pt states they medical staff inquired if pt was \"Okay\", that  their heart rate spiked. Pt was asymptomatic and cardiac rehab staff will continue to monitor and asses.    Outpatient Cardiac Rehab Start Date 12/13/18   Primary Physician Dr. Gray   Primary Physician Follow Up Completed   Surgeon Dr. Aly   Surgeon Follow Up Scheduled   Cardiologist None   Cardiologist Follow Up NA   Ejection Fraction 57%   Risk Stratification Low   Summary of Cath Report "   Summary of Cath Report Available   Date Performed 11/27/18   Left Main Mild diffuse disease   LAD Mid stent w/99% discrete disease   LCX Large/dominant; mild diffuse disease   RCA Small/non-dominant; Mild diffuse disease   Living and Work Status    Living Arrangements and Social Status house   Support System Live alone, family in area;Check-in help as needed   Return to Employment Retired   Occupation  at the [employer]   Preventative Medications   CMS recommended medications Influenza vaccination   Fall Risk Screen   Fall screen completed by Cardiac Rehab   Have you fallen 2 or more times in the past year? No   Have you fallen and had an injury in the past year? No   Timed Up and Go score (seconds) NA   Is patient a fall risk? No   Fall screen comments 12/13: Pt is not at risk for falls and will not need a referral to Phsyical therapy at this time.    Abuse Screen (yes response referral indicated)   Feels Unsafe at Home or Work/School no   Feels Threatened by Someone no   Does Anyone Try to Keep You From Having Contact with Others or Doing Things Outside Your Home? no   Physical Signs of Abuse Present no   Safety Plan 12/13: Pt does not have any concerns with physical or mental wellbeing and will not need further assessment unless changes noted by cardiac rehab staff.    Pain   Patient Currently in Pain Yes   Pain Location Tightness in Chest, due to Neck fusion   Pain Rating 0   Pain Description Pressure   Pain Description Comment Attributed to neck fusion. Pt experiences this pressure daily but it loosens up after rising from bed.   Physical Assessments   Incisions WNL   Edema None   Right Lung Sounds not assessed   Left Lung Sounds not assessed   Limitations No limitations   Comments 12/13: Pt's insicion site is healing appropriately and pt does not complain of any redness or irritation.    Individualized Treatment Plan   Monitored Sessions Scheduled 24   Monitored Sessions Attended 1   Oxygen  "  Supplemental Oxygen needed No   Nutrition Management - Weight Management   Assessment Initial Assessment   Age 68   Weight 113.4 kg (250 lb)   Height 1.803 m (5' 11\")   BMI (Calculated) 34.94   Goal Weight 106.6 kg (235 lb)   Initial Rate Your Plate Score. Dietary tool to assess eating patterns. Scores range from 24 to 72. The higher the score the healthier the eating pattern. 41   Weight Management Comments 12/13: Pt states they would like to lose 15lbs by attending Phase II rehab 3d/week to limit the difficulty of breathing and restrition of bending over.    Nutrition Management - Lipids   Lipids Labs Not Available   Prescribed Lipid Medication Yes   Statin Intensity Moderate Intensity   Lipid Comments 12/13: Pt adheres to all medications as prescribed.    Nutrition Management - Diabetes   Diabetes No   Nutrition Management Summary   Dietary Recommendations Low Fat;Low Cholesterol;Low Sodium   Stages of Change for Diet Compliance Preparation   Interventions Planned Attend Nutrition Education Class(es)   Nutrition Summary Comments 12/13: Pt states they consume on average 4-5 cups of vegetables and/or fruits daily. Staff will encourage pt to attend Nutrition class to learn more about other areas of a balanced heart healthy diet (i.e. lean meat).   Nutrition Target Outcome BMI < 25   Psychosocial Management   Psychosocial Assessment Initial   Is there history of clinical depression or increased risk of depression? No previous history   Current Level of Stress per Patient Report Denies   Current Coping Skills Has Positive Support System   Initial Patient Health Questionnaire -9 Score (PHQ-9) for depression. 5-9 Minimal symptoms, 10-14 Minor depression, 15-19 Major depression, moderately severe, > 20 Major depression, severe  1   Initial [facility] Survey score.  Quality of Life:   If total score > 25 review individual areas where patient rated a 4 or 5.  Consider patients current medical condition and what role " that plays on the score.   Adjust treatment protocol to improve areas of concern.  Consider the following:  PHQ9 score, DASI, and re-assessment within the next 30 days to assist with developing treatments.  16   Stages of Change Preparation   Interventions Planned Patient denies need for intervention at this time.   Patient Goal No   Psychosocial Comments 12/13: Pt's assessments are within normal standards and will not need further assessment until discharge session.    Psychosocial Target Outcome Identify absence or presence of depression using valid screening tool   Other Core Components - Hypertension   History of or Diagnosis of Hypertension Yes   Currently taking Anti-Hypertensives Yes;Beta blocker;CCB;Nitrates  (Nitro as needed)   Hypertension Comments 12/13: Pt is aware of the medications prescribed and their use for continued heart health. Pt talking medications as prescribed.   Other Core Components - Tobacco   History of Tobacco Use Yes   Quit Date or Planned Quit Date 04/01/03   Tobacco Use Status Former (Quit > 6 mo ago)   Tobacco Habit Cigarettes   Tobacco Use per Day (average) 2ppd   Years of Tobacco Use 33   Stages of Change Maintenance   Tobacco Comments 12/13: Pt remains smoke free since quitting in early 2003. Staff will continue to monitor and assess throughout cardiac rehab.   Other Core Components Summary   Interventions Planned None   Patient Goals No   Other Core Components Comments 12/13: Pt does not need further assessment with core components.    Other Core Components Target Outcome BP < 140/90 or < 130/80 with DM or CKD   Activity/Exercise History   Activity/Exercise Assessment Initial   Activity/Exercise Status prior to event? Was Physically Active;Participated in an Exercise Program  (Participates in Aerobic Exercise during the summer)   Number of Days Currently participating in Moderate Physical Activity? 0   Number of Days Currently performing  Aerobic Exercise (including rehab)? 0    Number of Minutes per Session Currently of Aerobic Exercise (average)? 0   Current Stage of Change (Physical Activity) Action   Current Stage of Change (Aerobic Exercise) Preparation   Patient Goals Goal #1   Goal #1 Description 12/13: Pt would like to lose 15lbs via proper aerobic exercise/resistance training 3 d/week for 30-60 minutes bouts.     Goal #1 Target Date 04/13/18   Goal #1 Progress Towards Goal 12/13: Pt will initiate Phase II rehab Friday, Dec 14th for initial aerobic exercise session.   Activity/Exercise Comments 12/13: Pt has been encouraged to start a HEP in addition to aerobic exercise at cardiac rehab. Staff will continue to monitor and assess.    Activity/Exercise Target Outcome An Accumulation of 150  Minutes of Aerobic Activity per Week   Exercise Assessment   6 Minute Walk Predicted - Gender Selection Male   6 Minute Walk Predicted (Male) 1672.12   6 Minute Walk Predicted (Female) 1274.18   Initial 6 Minute Walk Distance (Feet) 1294 ft   Resting HR 76 bpm   Exercise  bpm   Post Exercise HR 65 bpm   Resting /81   Exercise /80   Post Exercise /80   Pre SpO2 97   While Exercising SpO2 96   Post SpO2 99   Effort Rating 5   Current MET Level 2.9   MET Level Goal 5   ECG Rhythm Atrial fibrillation   Ectopy PVCs;Other (Comments)  (Multifocal PVCs with one couplet noted)   Current Symptoms Denies symptoms   Limitations/Restrictions Other (see comments)  (L knee replacement, may limit exercise mobility)   Exercise Prescription   Mode Treadmill;Nustep;Recumbant bike;Arm Ergometer;Upright bike;Rowing machine   Duration/Time 30-45 min   Frequency 3 daysweek   THR (85% of age predicted max HR) 128.35   OMNI Effort Rating (0-10 Scale) 4-6/10   Progression Continuous bouts;Total exercise time of 30-45 minutes   Comments 12/13: Pt is ready to start Phase II rehab and will start initial aerobic exercise session on Dec 14th, 2018.   Recommended Home Exercise   Type of Exercise Walking    Frequency (days per week) 2-3   Duration (minutes per session) 15-30 min   Effort Rating Recommended 4-6/10   30 Day Exercise Plan 12/13: Pt has been encouraged to start HEP and staff will assess program at a later date.    Current Home Exercise   Type of Exercise None   Frequency (days per week) 0   Duration (minutes per session) 0   Follow-up/On-going Support   Provider follow-up needed on the following No follow-up needed   Learning Assessment   Learner Patient   Primary Language English   Preferred Learning Style Listening;Reading;Demonstration;Pictures/Video   Barriers to Learning No barriers noted   Patient Education   Education recommended Anatomy and Physiology of the Heart;Blood Pressure;Exercise Principles;Medication Overview;Muscle Conditioning;Nutrition;Risk Factors;Stress Management   Education Comments 12/13: Pt has been encouraged to attend educational sessions.   Physician cosignature/electronic signature indicates approval of this ITP document. I have established, reviewed and made necessary changes to the individualized treatment plan and exercise prescription for this patient.

## 2018-12-14 ENCOUNTER — HOSPITAL ENCOUNTER (OUTPATIENT)
Dept: CARDIAC REHAB | Facility: HOSPITAL | Age: 68
Setting detail: THERAPIES SERIES
End: 2018-12-14
Attending: INTERNAL MEDICINE
Payer: MEDICARE

## 2018-12-14 PROCEDURE — 40000116 ZZH STATISTIC OP CR VISIT

## 2018-12-14 PROCEDURE — 93798 PHYS/QHP OP CAR RHAB W/ECG: CPT

## 2018-12-17 ENCOUNTER — HOSPITAL ENCOUNTER (OUTPATIENT)
Dept: CARDIAC REHAB | Facility: HOSPITAL | Age: 68
Setting detail: THERAPIES SERIES
End: 2018-12-17
Attending: INTERNAL MEDICINE
Payer: MEDICARE

## 2018-12-17 PROCEDURE — 40000116 ZZH STATISTIC OP CR VISIT

## 2018-12-17 PROCEDURE — 93798 PHYS/QHP OP CAR RHAB W/ECG: CPT

## 2018-12-19 ENCOUNTER — HOSPITAL ENCOUNTER (OUTPATIENT)
Dept: CARDIAC REHAB | Facility: HOSPITAL | Age: 68
Setting detail: THERAPIES SERIES
End: 2018-12-19
Attending: INTERNAL MEDICINE
Payer: MEDICARE

## 2018-12-19 PROCEDURE — 40000116 ZZH STATISTIC OP CR VISIT

## 2018-12-19 PROCEDURE — 93798 PHYS/QHP OP CAR RHAB W/ECG: CPT

## 2018-12-21 ENCOUNTER — HOSPITAL ENCOUNTER (OUTPATIENT)
Dept: CARDIAC REHAB | Facility: HOSPITAL | Age: 68
Setting detail: THERAPIES SERIES
End: 2018-12-21
Attending: INTERNAL MEDICINE
Payer: MEDICARE

## 2018-12-21 PROCEDURE — 93798 PHYS/QHP OP CAR RHAB W/ECG: CPT

## 2018-12-21 PROCEDURE — 40000116 ZZH STATISTIC OP CR VISIT

## 2018-12-26 ENCOUNTER — HOSPITAL ENCOUNTER (OUTPATIENT)
Dept: CARDIAC REHAB | Facility: HOSPITAL | Age: 68
Setting detail: THERAPIES SERIES
End: 2018-12-26
Attending: INTERNAL MEDICINE
Payer: MEDICARE

## 2018-12-26 PROCEDURE — 93798 PHYS/QHP OP CAR RHAB W/ECG: CPT

## 2018-12-26 PROCEDURE — 40000116 ZZH STATISTIC OP CR VISIT

## 2018-12-28 ENCOUNTER — HOSPITAL ENCOUNTER (OUTPATIENT)
Dept: CARDIAC REHAB | Facility: HOSPITAL | Age: 68
Setting detail: THERAPIES SERIES
End: 2018-12-28
Attending: INTERNAL MEDICINE
Payer: MEDICARE

## 2018-12-28 VITALS — WEIGHT: 252 LBS | HEIGHT: 71 IN | BODY MASS INDEX: 35.28 KG/M2

## 2018-12-28 PROCEDURE — 93798 PHYS/QHP OP CAR RHAB W/ECG: CPT

## 2018-12-28 PROCEDURE — 40000116 ZZH STATISTIC OP CR VISIT

## 2018-12-28 ASSESSMENT — 6 MINUTE WALK TEST (6MWT)
FEMALE CALC: 1267.35
PREDICTED: 1677.04
GENDER SELECTION: MALE
TOTAL DISTANCE WALKED (FT): 1700
TOTAL DISTANCE WALKED (FT): 1294
MALE CALC: 1666.88

## 2018-12-28 ASSESSMENT — MIFFLIN-ST. JEOR: SCORE: 1930.19

## 2018-12-28 NOTE — PROGRESS NOTES
" 12/28/18 1400   Session   Session Discharge Note   Certified through this date 12/28/18   Cardiac Rehab Assessment   Cardiac Rehab Assessment 12/28: Melo has attended seven exercise sessions and no education sessions in Phase II Cardiac Rehab. He requested an early discharge as his brother gifted him a two month trip to [place] in a condo. He will be leaving next week. He is doing well overall and is happy to have learned techniques to help him, especially with his breathing. He is practicing the pursed lip breathing technique that he was taught.   Pt made significant gains in exercise tolerance. Initially patient tolerated 11 minutes at 2.9METs, now tolerating 45 minutes at 4 METs intermittently. Patient also increased 6-minute walk test by 31% (an increase of 406 feet.) The PT was given instructions on frequency, intensity, and duration for continued exercise as well as muscle conditioning and stretching exercises.  Your PT plans to join a [employer] near where he is staying in [place] and will transition to [facility] in [place] upon return. He will be walking in the spring in [place]. He will also be working on strength training.      General Information   Treatment Diagnosis Stent   Date of Treatment Diagnosis 11/27/18   Significant Past CV History Previous PCI;Previous MI  (Stent (2003, 2010); MI 2003)   Comorbidities Previous MI   Other Medical History Tubular adenomas; GERD; Cervical spondylosis w/fusion of discs; A-fib; Sleep apnea; Full Knee Replacement   Lead up symptoms 12/13: Pt was experiencing burning sensation that pt attributed to pleurisy, to which pt was treated for in 2003. Pt noticed symptoms would occur when they were in the \"open air\" and occasionally SOB would be experienced when positional changes occurred when tying their shoes. Symptoms would resolve following rest between 5-20 minutes. Finally, pt made apt with PCP after 2 months to which angioplasty was recommended,   Hospital Location . " [facility]   Hospital Discharge Date 11/28/18   Signs and Symptoms Post Hospital Discharge Other (see comments)  (Upon d/c pt would experience spike in HR when bending. )   Outpatient Cardiac Rehab Start Date 12/13/18   Primary Physician Dr. Gray   Primary Physician Follow Up Completed   Surgeon Dr. Aly   Surgeon Follow Up Scheduled   Cardiologist None   Cardiologist Follow Up NA   Ejection Fraction 57%   Risk Stratification Low   Summary of Cath Report   Summary of Cath Report Available   Date Performed 11/27/18   Left Main Mild diffuse disease   LAD Mid stent w/99% discrete disease   LCX Large/dominant; mild diffuse disease   RCA Small/non-dominant; Mild diffuse disease   Living and Work Status    Living Arrangements and Social Status house   Support System Live alone, family in area;Check-in help as needed   Return to Employment Retired   Occupation  at the [employer]   Preventative Medications   CMS recommended medications Influenza vaccination   Fall Risk Screen   Fall screen completed by Cardiac Rehab   Have you fallen 2 or more times in the past year? No   Have you fallen and had an injury in the past year? No   Timed Up and Go score (seconds) NA   Is patient a fall risk? No   Fall screen comments 12/28: Pt is not at risk for falls and will not need a referral to Phsyical therapy at this time.    Abuse Screen (yes response referral indicated)   Feels Unsafe at Home or Work/School no   Feels Threatened by Someone no   Does Anyone Try to Keep You From Having Contact with Others or Doing Things Outside Your Home? no   Physical Signs of Abuse Present no   Pain   Patient Currently in Pain Yes   Pain Location Tightness in Chest, due to Neck fusion   Pain Rating 0   Pain Description Pressure   Pain Description Comment Attributed to neck fusion. Pt experiences this pressure daily but it loosens up after rising from bed.   Physical Assessments   Incisions WNL   Edema None   Right Lung Sounds  "not assessed   Left Lung Sounds not assessed   Limitations No limitations   Comments 12/28:Patient's physical assessments are WNL.   Individualized Treatment Plan   Monitored Sessions Scheduled 4   Monitored Sessions Attended 7   Oxygen   Supplemental Oxygen needed No   Nutrition Management - Weight Management   Assessment Discharge   Age 68   Weight 114.3 kg (252 lb)   Height 1.803 m (5' 11\")   BMI (Calculated) 35.22   Goal Weight 106.6 kg (234 lb 16 oz)   Initial Rate Your Plate Score. Dietary tool to assess eating patterns. Scores range from 24 to 72. The higher the score the healthier the eating pattern. 41   Discharge Rate Your Plate Score 49   Weight Management Comments 12/28: Patient is increasing his fruit and vegetable intake, especially bananas.    Nutrition Management - Lipids   Lipids Labs Not Available   Prescribed Lipid Medication Yes   Statin Intensity Moderate Intensity   Lipid Comments 12/28: Patient following prescribed medication orders.    Nutrition Management - Diabetes   Diabetes No   Nutrition Management Summary   Dietary Recommendations Low Fat;Low Cholesterol;Low Sodium   Stages of Change for Diet Compliance Preparation   Interventions Planned Attend Nutrition Education Class(es)   Nutrition Summary Comments 12/13: Pt states they consume on average 4-5 cups of vegetables and/or fruits daily.    Nutrition Target Outcome BMI < 25   Psychosocial Management   Psychosocial Assessment Discharge   Is there history of clinical depression or increased risk of depression? No previous history   Current Level of Stress per Patient Report Denies   Current Coping Skills Has Positive Support System   Initial Patient Health Questionnaire -9 Score (PHQ-9) for depression. 5-9 Minimal symptoms, 10-14 Minor depression, 15-19 Major depression, moderately severe, > 20 Major depression, severe  1   Discharge PHQ-9 Score for Depression 0   Initial [facility] Survey score.  Quality of Life:   If total score > 25 " review individual areas where patient rated a 4 or 5.  Consider patients current medical condition and what role that plays on the score.   Adjust treatment protocol to improve areas of concern.  Consider the following:  PHQ9 score, DASI, and re-assessment within the next 30 days to assist with developing treatments.  16   Discharge [facility] Survey Score 10   Stages of Change Action   Interventions Planned Patient denies need for intervention at this time.   Patient Goal No   Psychosocial Comments 12/28: Patient improved his survey scores within the last two weeks.    Psychosocial Target Outcome Identify absence or presence of depression using valid screening tool   Other Core Components - Hypertension   History of or Diagnosis of Hypertension Yes   Currently taking Anti-Hypertensives Yes;Beta blocker;CCB;Nitrates  (Nitro as needed)   Hypertension Comments 12/28: Patient is taking meds as prescribed.    Other Core Components - Tobacco   History of Tobacco Use Yes   Quit Date or Planned Quit Date 04/01/03   Tobacco Use Status Former (Quit > 6 mo ago)   Tobacco Habit Cigarettes   Tobacco Use per Day (average) 2ppd   Years of Tobacco Use 33   Stages of Change Maintenance   Tobacco Comments 12/28: Patient is not at risk for smoking again.    Other Core Components Summary   Interventions Planned None   Patient Goals No   Other Core Components Comments 12/28: Pt does not need further assessment with core components.    Other Core Components Target Outcome BP < 140/90 or < 130/80 with DM or CKD   Activity/Exercise History   Activity/Exercise Assessment Discharge   Activity/Exercise Status prior to event? Was Physically Active;Participated in an Exercise Program   Number of Days Currently participating in Moderate Physical Activity? 3   Number of Days Currently performing  Aerobic Exercise (including rehab)? 3   Number of Minutes per Session Currently of Aerobic Exercise (average)? 3   Current Stage of Change  (Physical Activity) Action   Current Stage of Change (Aerobic Exercise) Action   Patient Goals Goal #1   Goal #1 Description 12/13: Pt would like to lose 15lbs via proper aerobic exercise/resistance training 3 d/week for 30-60 minutes bouts.     Goal #1 Target Date 04/13/18   Goal #1 Progress Towards Goal 12/28: Patient has started routine exercise in [place]. He will be joining [employer] in [place] and then when he returns to [place] again. He will also be walking regularly in the spring.    Activity/Exercise Comments 12/28: Patient is encouraged to continue his home exercise.    Activity/Exercise Target Outcome An Accumulation of 150  Minutes of Aerobic Activity per Week   Exercise Assessment   6 Minute Walk Predicted - Gender Selection Male   6 Minute Walk Predicted (Male) 1666.88   6 Minute Walk Predicted (Female) 1267.35   Initial 6 Minute Walk Distance (Feet) 1294 ft   Discharge 6 Minute Walk Distance (Feet) 1700   Resting HR 73 bpm   Exercise  bpm   Post Exercise HR 74 bpm   Resting /82   Exercise /64   Post Exercise /56   Pre SpO2 97   While Exercising SpO2 97   Post SpO2 97   Effort Rating 4-5   Current MET Level 3.5   MET Level Goal 5   ECG Rhythm Atrial fibrillation   Ectopy PVCs;Other (Comments)  (Multifocal PVCs with one couplet noted)   Current Symptoms Denies symptoms   Limitations/Restrictions Other (see comments)  (L knee replacement, may limit exercise mobility)   Exercise Prescription   Mode Treadmill;Nustep;Recumbant bike;Arm Ergometer;Upright bike;Rowing machine   Duration/Time 30-45 min   Frequency 3 daysweek   THR (85% of age predicted max HR) 128.35   OMNI Effort Rating (0-10 Scale) 4-6/10   Progression Continuous bouts;Total exercise time of 30-45 minutes   Comments 12/28: Patient is motivated to continue exercising upon completion of program.   Recommended Home Exercise   Type of Exercise Walking   Frequency (days per week) 5-6   Duration (minutes per session) 30-45 min    Effort Rating Recommended 4-6/10   30 Day Exercise Plan 12/28: Patient plans on joining the [facility] in [place] upon his arrival there.    Follow-up/On-going Support   Provider follow-up needed on the following No follow-up needed   Learning Assessment   Learner Patient   Primary Language English   Preferred Learning Style Listening;Reading;Demonstration;Pictures/Video   Barriers to Learning No barriers noted   Patient Education   Education recommended Anatomy and Physiology of the Heart;Blood Pressure;Exercise Principles;Medication Overview;Muscle Conditioning;Nutrition;Risk Factors;Stress Management   Education Comments 12/28: Patient was encouraged to attend edcuation, but he declined.    Physician cosignature/electronic signature indicates approval of this ITP document. I have established, reviewed and made necessary changes to the individualized treatment plan and exercise prescription for this patient.

## 2019-06-24 ENCOUNTER — APPOINTMENT (OUTPATIENT)
Dept: GENERAL RADIOLOGY | Facility: HOSPITAL | Age: 69
End: 2019-06-24
Attending: PHYSICIAN ASSISTANT
Payer: MEDICARE

## 2019-06-24 ENCOUNTER — HOSPITAL ENCOUNTER (EMERGENCY)
Facility: HOSPITAL | Age: 69
Discharge: HOME | End: 2019-06-24
Attending: PHYSICIAN ASSISTANT | Admitting: PHYSICIAN ASSISTANT
Payer: MEDICARE

## 2019-06-24 VITALS
OXYGEN SATURATION: 98 % | RESPIRATION RATE: 16 BRPM | SYSTOLIC BLOOD PRESSURE: 152 MMHG | BODY MASS INDEX: 35.57 KG/M2 | WEIGHT: 255 LBS | TEMPERATURE: 97.7 F | DIASTOLIC BLOOD PRESSURE: 111 MMHG

## 2019-06-24 DIAGNOSIS — R07.9 CHEST PAIN, UNSPECIFIED TYPE: ICD-10-CM

## 2019-06-24 DIAGNOSIS — R09.81 NASAL CONGESTION DUE TO PROLONGED USE OF DECONGESTANTS: ICD-10-CM

## 2019-06-24 DIAGNOSIS — T48.5X5A NASAL CONGESTION DUE TO PROLONGED USE OF DECONGESTANTS: ICD-10-CM

## 2019-06-24 DIAGNOSIS — I10 HYPERTENSION, UNSPECIFIED TYPE: ICD-10-CM

## 2019-06-24 DIAGNOSIS — R06.02 SHORTNESS OF BREATH: ICD-10-CM

## 2019-06-24 DIAGNOSIS — I10 BENIGN ESSENTIAL HTN: ICD-10-CM

## 2019-06-24 LAB
ANION GAP SERPL CALCULATED.3IONS-SCNC: 3 MMOL/L (ref 3–14)
BASOPHILS # BLD AUTO: 0.1 10E9/L (ref 0–0.2)
BASOPHILS NFR BLD AUTO: 1.1 %
BUN SERPL-MCNC: 15 MG/DL (ref 7–30)
CALCIUM SERPL-MCNC: 9.1 MG/DL (ref 8.5–10.1)
CHLORIDE SERPL-SCNC: 108 MMOL/L (ref 94–109)
CO2 SERPL-SCNC: 29 MMOL/L (ref 20–32)
CREAT SERPL-MCNC: 1.13 MG/DL (ref 0.66–1.25)
DIFFERENTIAL METHOD BLD: NORMAL
EOSINOPHIL # BLD AUTO: 0.2 10E9/L (ref 0–0.7)
EOSINOPHIL NFR BLD AUTO: 2.1 %
ERYTHROCYTE [DISTWIDTH] IN BLOOD BY AUTOMATED COUNT: 12.7 % (ref 10–15)
GFR SERPL CREATININE-BSD FRML MDRD: 66 ML/MIN/{1.73_M2}
GLUCOSE SERPL-MCNC: 105 MG/DL (ref 70–99)
HCT VFR BLD AUTO: 42.5 % (ref 40–53)
HGB BLD-MCNC: 14.6 G/DL (ref 13.3–17.7)
IMM GRANULOCYTES # BLD: 0 10E9/L (ref 0–0.4)
IMM GRANULOCYTES NFR BLD: 0.3 %
LYMPHOCYTES # BLD AUTO: 1.4 10E9/L (ref 0.8–5.3)
LYMPHOCYTES NFR BLD AUTO: 18.9 %
MCH RBC QN AUTO: 31.5 PG (ref 26.5–33)
MCHC RBC AUTO-ENTMCNC: 34.4 G/DL (ref 31.5–36.5)
MCV RBC AUTO: 92 FL (ref 78–100)
MONOCYTES # BLD AUTO: 0.5 10E9/L (ref 0–1.3)
MONOCYTES NFR BLD AUTO: 7 %
NEUTROPHILS # BLD AUTO: 5.1 10E9/L (ref 1.6–8.3)
NEUTROPHILS NFR BLD AUTO: 70.6 %
NRBC # BLD AUTO: 0 10*3/UL
NRBC BLD AUTO-RTO: 0 /100
PLATELET # BLD AUTO: 221 10E9/L (ref 150–450)
POTASSIUM SERPL-SCNC: 4.8 MMOL/L (ref 3.4–5.3)
RBC # BLD AUTO: 4.64 10E12/L (ref 4.4–5.9)
SODIUM SERPL-SCNC: 140 MMOL/L (ref 133–144)
TROPONIN I SERPL-MCNC: <0.015 UG/L (ref 0–0.04)
WBC # BLD AUTO: 7.3 10E9/L (ref 4–11)

## 2019-06-24 PROCEDURE — 36415 COLL VENOUS BLD VENIPUNCTURE: CPT | Performed by: PHYSICIAN ASSISTANT

## 2019-06-24 PROCEDURE — 99285 EMERGENCY DEPT VISIT HI MDM: CPT | Mod: Z6 | Performed by: PHYSICIAN ASSISTANT

## 2019-06-24 PROCEDURE — 80048 BASIC METABOLIC PNL TOTAL CA: CPT | Performed by: PHYSICIAN ASSISTANT

## 2019-06-24 PROCEDURE — 99285 EMERGENCY DEPT VISIT HI MDM: CPT | Mod: 25

## 2019-06-24 PROCEDURE — 85025 COMPLETE CBC W/AUTO DIFF WBC: CPT | Performed by: PHYSICIAN ASSISTANT

## 2019-06-24 PROCEDURE — 93005 ELECTROCARDIOGRAM TRACING: CPT

## 2019-06-24 PROCEDURE — 71046 X-RAY EXAM CHEST 2 VIEWS: CPT | Mod: TC

## 2019-06-24 PROCEDURE — 93010 ELECTROCARDIOGRAM REPORT: CPT | Performed by: INTERNAL MEDICINE

## 2019-06-24 PROCEDURE — 84484 ASSAY OF TROPONIN QUANT: CPT | Performed by: PHYSICIAN ASSISTANT

## 2019-06-24 RX ORDER — METOPROLOL SUCCINATE 200 MG/1
TABLET, EXTENDED RELEASE ORAL
COMMUNITY
Start: 2018-11-19

## 2019-06-24 RX ORDER — LOSARTAN POTASSIUM 25 MG/1
100 TABLET ORAL DAILY
COMMUNITY
Start: 2018-09-26

## 2019-06-24 RX ORDER — ATORVASTATIN CALCIUM 40 MG/1
TABLET, FILM COATED ORAL
COMMUNITY
Start: 2018-11-28

## 2019-06-24 RX ORDER — CLOPIDOGREL BISULFATE 75 MG/1
TABLET ORAL
COMMUNITY
Start: 2018-11-28

## 2019-06-24 RX ORDER — MONTELUKAST SODIUM 10 MG/1
TABLET ORAL
COMMUNITY
Start: 2018-09-11

## 2019-06-24 ASSESSMENT — ENCOUNTER SYMPTOMS
GASTROINTESTINAL NEGATIVE: 1
COUGH: 0
PALPITATIONS: 0
CONSTITUTIONAL NEGATIVE: 1
SINUS PRESSURE: 1
NEUROLOGICAL NEGATIVE: 1
SHORTNESS OF BREATH: 1
EYES NEGATIVE: 1
MUSCULOSKELETAL NEGATIVE: 1

## 2019-06-24 NOTE — DISCHARGE INSTRUCTIONS
Discontinue use of nasal decongestant.  Consider using oral decongestant such as Claritin, Zyrtec, etc.  Return to ED if severely worsening chest pain, shortness of breath, other concerning symptoms.

## 2019-06-24 NOTE — ED AVS SNAPSHOT
[facility]  [address] [address]  Phone:  [phone]                                    Dakota Gaston   MRN: [id number]    Department:  [facility]   Date of Visit:  6/24/2019           After Visit Summary Signature Page    I have received my discharge instructions, and my questions have been answered. I have discussed any challenges I see with this plan with the nurse or doctor.    ..........................................................................................................................................  Patient/Patient Representative Signature      ..........................................................................................................................................  Patient Representative Print Name and Relationship to Patient    ..................................................               ................................................  Date                                   Time    ..........................................................................................................................................  Reviewed by Signature/Title    ...................................................              ..............................................  Date                                               Time          22EPIC Rev 08/18

## 2019-06-24 NOTE — ED NOTES
Patient presents to the emergency room with concerns about elevated BP and feeling SOB.  States he has been having episode where he feels like he can't get enough air in via his nose so then he has to breathe through his mouth.  Patient states when he does that at home he also sometimes gets pain in his chest and down his arms.  Patient states he had a stent occluded that the re-stented.  Denies any chest pain today; states he last had chest pain approximately two days ago.  BP recheck 177/122, cardiac monitor on; noted to be afib with possible ST elevation; EKG ordered.  Call light within reach.  Patient is awake/alert and interactive.  Skin is pink, dry and intact.

## 2019-06-24 NOTE — ED NOTES
Discharge instructions reviewed with patient and family member.  Patient without any pain or discomfort at this time.  Home to rest.  Will follow up with primary for further BP control and workup.

## 2019-06-24 NOTE — ED TRIAGE NOTES
SOB for over a month.  Went to PCP office for a BP check, pt stated it was 175 systolic and encourage pt to come to ED.

## 2019-06-25 NOTE — ED PROVIDER NOTES
History     Chief Complaint   Patient presents with     Hypertension     Shortness of Breath     greater then a month     HPI  Dakota Gaston is a 68 year old male who presents the emergency department with complaints of high blood pressure, lightheadedness that started around 6:30 AM this morning with associated shortness of breath that has been intermittent and aggravated by activity and by bending over.  Patient reports having chest pain radiating into bilateral arms which she describes as sharp at times and dull and achy at other times.  Patient is currently taking Coumadin for atrial fibrillation and his INR was last checked 2 hours ago and was 2.3 in clinic.  Patient denies history of myocardial infarction or pulmonary embolism.  He denies any unusual leg pain.     Allergies:  Allergies   Allergen Reactions     Penicillin G        Problem List:    Patient Active Problem List    Diagnosis Date Noted     Benign essential HTN 09/06/2017     Priority: Medium     Sepsis due to Enterococcus (H) 09/06/2017     Priority: Medium     Urinary tract infection, site not specified 09/03/2017     Priority: Medium     Influenza B 02/15/2015     Priority: Medium     Class: Acute     Subtherapeutic international normalized ratio (INR) 02/14/2015     Priority: Medium     Diagnosis updated by automated process. Provider to review and confirm.       Atrial fibrillation (H) 02/14/2015     Priority: Medium     Atypical chest pain 02/14/2015     Priority: Medium     Renal insufficiency 02/14/2015     Priority: Medium     Elevated brain natriuretic peptide (BNP) level 02/14/2015     Priority: Medium     History of colon polyps 04/05/2013     Priority: Medium        Past Medical History:    Past Medical History:   Diagnosis Date     Arrhythmia      Atrial fibrillation (H)      Coronary artery disease      Dyspnea on exertion      Gastro-oesophageal reflux disease      Hypertension      Sleep apnea      Spondylosis of cervical  joint      Stented coronary artery      Tubular adenoma        Past Surgical History:    Past Surgical History:   Procedure Laterality Date     angioplasty with stenting[       arthroscopy left knee[      meniscus tear.     ARTHROTOMY SHOULDER, ROTATOR CUFF REPAIR, COMBINED      spur removed, right shoulder.     CARDIAC SURGERY       COLONOSCOPY       COLONOSCOPY  2013    Procedure: COLONOSCOPY;  colonoscopy with Polypectomy;  Surgeon: Vickey Martinez MD;  Location: [facility]     COLONOSCOPY N/A 2015    Procedure: COLONOSCOPY;  Surgeon: Vickey Martinez MD;  Location: [facility]     deviated septum repair[       discectomy and fusion[      C3-C6     ENT SURGERY       tube placement in ears[         Family History:    Family History   Problem Relation Age of Onset     Lung Cancer Father      Aneurysm Father      Chronic Obstructive Pulmonary Disease Father      Heart Disease Father      Cancer Father      Hypertension Mother      Cerebrovascular Disease Mother      Mental Illness Mother      Lung Cancer Brother         x2     Pancreatic Cancer Brother      Pancreatic Cancer Sister        Social History:  Marital Status:   [4]  Social History     Tobacco Use     Smoking status: Former Smoker     Last attempt to quit: 2003     Years since quittin.2     Smokeless tobacco: Never Used   Substance Use Topics     Alcohol use: No     Comment: rona     Drug use: No        Medications:      atorvastatin (LIPITOR) 40 MG tablet   clopidogrel (PLAVIX) 75 MG tablet   losartan (COZAAR) 25 MG tablet   metoprolol succinate ER (TOPROL-XL) 200 MG 24 hr tablet   montelukast (SINGULAIR) 10 MG tablet   nitroglycerin (NITROSTAT) 0.4 MG SL tablet   OMEPRAZOLE PO   SIMVASTATIN PO   warfarin (COUMADIN) 5 MG tablet         Review of Systems   Constitutional: Negative.    HENT: Positive for congestion and sinus pressure.    Eyes: Negative.    Respiratory: Positive for shortness of breath. Negative for cough.   "  Cardiovascular: Positive for chest pain. Negative for palpitations and leg swelling.   Gastrointestinal: Negative.    Genitourinary: Negative.    Musculoskeletal: Negative.    Skin: Negative.    Neurological: Negative.    Hematological:        Currently anticoagulated.        Physical Exam   BP: (!) 184/111  Heart Rate: 86  Temp: 97.7  F (36.5  C)  Resp: 22  Weight: 115.7 kg (255 lb)  SpO2: 99 %      Physical Exam   Constitutional: He is oriented to person, place, and time. No distress.   HENT:   Head: Atraumatic.   Mouth/Throat: Oropharynx is clear and moist.   Nasal congestion.   Eyes: Pupils are equal, round, and reactive to light. Conjunctivae and EOM are normal. No scleral icterus.   Neck: Normal range of motion.   Cardiovascular: Normal rate and intact distal pulses. Exam reveals no gallop and no friction rub.   No murmur heard.  Faint heart tones.   Pulmonary/Chest: Effort normal and breath sounds normal. No respiratory distress. He exhibits no tenderness.   Abdominal: Soft. Bowel sounds are normal. There is no tenderness.   Musculoskeletal: He exhibits no edema or tenderness.   Neurological: He is alert and oriented to person, place, and time.   Skin: Skin is warm. No rash noted. He is not diaphoretic.       ED Course        Procedures  Symptoms concerning for possible myocardial infarction, pneumonia, pulmonary embolism.  EKG shows atrial fibrillation with normal heart rate.  No ST segment changes suggestive of MI.  Troponin is negative more than 6 hours from onset of symptoms.  Chest x-ray negative for pneumonia.  Pulmonary embolism highly unlikely given therapeutic INR.    On further discussion with the patient he describes the \"shortness of breath as more difficulty with breathing through the nasal passages and this is significantly worse in the evenings.  I further learned that she has been using spray nasal decongestants for over a month many times per day.  Discussed with patient that this is likely " increasing his blood pressure and is triggering his sinus congestion rather than alleviating it.  Recommended he discontinue the nasal spray entirely.  Recommended return for recheck if significantly worsening chest pain or other concerning symptoms.  Last blood pressure check before departure was 148/89.             EKG Interpretation:      Interpreted by Nolan Alvarenga  Time reviewed: 1300  Symptoms at time of EKG: chest pain, sob   Rhythm: atrial fibrillation   Rate: 69  Axis: normal  ST Segments/ T Waves: No ST-T wave changes  Q Waves: none  Comparison to prior: Unchanged    Clinical Impression: normal EKG               Results for orders placed or performed during the hospital encounter of 06/24/19 (from the past 24 hour(s))   CBC with platelets differential   Result Value Ref Range    WBC 7.3 4.0 - 11.0 10e9/L    RBC Count 4.64 4.4 - 5.9 10e12/L    Hemoglobin 14.6 13.3 - 17.7 g/dL    Hematocrit 42.5 40.0 - 53.0 %    MCV 92 78 - 100 fl    MCH 31.5 26.5 - 33.0 pg    MCHC 34.4 31.5 - 36.5 g/dL    RDW 12.7 10.0 - 15.0 %    Platelet Count 221 150 - 450 10e9/L    Diff Method Automated Method     % Neutrophils 70.6 %    % Lymphocytes 18.9 %    % Monocytes 7.0 %    % Eosinophils 2.1 %    % Basophils 1.1 %    % Immature Granulocytes 0.3 %    Nucleated RBCs 0 0 /100    Absolute Neutrophil 5.1 1.6 - 8.3 10e9/L    Absolute Lymphocytes 1.4 0.8 - 5.3 10e9/L    Absolute Monocytes 0.5 0.0 - 1.3 10e9/L    Absolute Eosinophils 0.2 0.0 - 0.7 10e9/L    Absolute Basophils 0.1 0.0 - 0.2 10e9/L    Abs Immature Granulocytes 0.0 0 - 0.4 10e9/L    Absolute Nucleated RBC 0.0    Basic metabolic panel   Result Value Ref Range    Sodium 140 133 - 144 mmol/L    Potassium 4.8 3.4 - 5.3 mmol/L    Chloride 108 94 - 109 mmol/L    Carbon Dioxide 29 20 - 32 mmol/L    Anion Gap 3 3 - 14 mmol/L    Glucose 105 (H) 70 - 99 mg/dL    Urea Nitrogen 15 7 - 30 mg/dL    Creatinine 1.13 0.66 - 1.25 mg/dL    GFR Estimate 66 >60 mL/min/[1.73_m2]    GFR  Estimate If Black 76 >60 mL/min/[1.73_m2]    Calcium 9.1 8.5 - 10.1 mg/dL   Troponin I   Result Value Ref Range    Troponin I ES <0.015 0.000 - 0.045 ug/L   Chest XR,  PA & LAT    Narrative    PROCEDURE: XR CHEST 2 VW 6/24/2019 1:22 PM    HISTORY: chest pain, sob    COMPARISONS: 9/3/2017.    TECHNIQUE: 2 views.    FINDINGS: Heart is probably normal in size. No acute infiltrate or  effusion is seen.    Mild degenerative change is seen in the spine. There is been previous  cervical fusion.         Impression    IMPRESSION: No acute disease.    MICHELLE FLORES MD       Medications - No data to display    Assessments & Plan (with Medical Decision Making)     I have reviewed the nursing notes.    I have reviewed the findings, diagnosis, plan and need for follow up with the patient.       Medication List      There are no discharge medications for this visit.         Final diagnoses:   Nasal congestion due to prolonged use of decongestants   Shortness of breath   Chest pain, unspecified type   Hypertension, unspecified type     MILE Parkinson on 6/24/2019 at 10:19 PM   6/24/2019   [facility]     Nolan Alvarenga PA  06/24/19 6411

## 2024-04-24 ENCOUNTER — TRANSFERRED RECORDS (OUTPATIENT)
Dept: HEALTH INFORMATION MANAGEMENT | Facility: CLINIC | Age: 74
End: 2024-04-24

## 2024-04-26 ENCOUNTER — HOSPITAL ENCOUNTER (EMERGENCY)
Facility: HOSPITAL | Age: 74
Discharge: HOME | End: 2024-04-27
Attending: EMERGENCY MEDICINE | Admitting: EMERGENCY MEDICINE
Payer: MEDICARE

## 2024-04-26 VITALS
WEIGHT: 262.79 LBS | TEMPERATURE: 97.2 F | HEART RATE: 75 BPM | BODY MASS INDEX: 36.65 KG/M2 | SYSTOLIC BLOOD PRESSURE: 156 MMHG | RESPIRATION RATE: 18 BRPM | DIASTOLIC BLOOD PRESSURE: 94 MMHG | OXYGEN SATURATION: 97 %

## 2024-04-26 DIAGNOSIS — H61.21 IMPACTED CERUMEN OF RIGHT EAR: ICD-10-CM

## 2024-04-26 LAB
ALBUMIN SERPL BCG-MCNC: 4.2 G/DL (ref 3.5–5.2)
ALP SERPL-CCNC: 40 U/L (ref 40–150)
ALT SERPL W P-5'-P-CCNC: 37 U/L (ref 0–70)
ANION GAP SERPL CALCULATED.3IONS-SCNC: 12 MMOL/L (ref 7–15)
AST SERPL W P-5'-P-CCNC: 29 U/L (ref 0–45)
BASOPHILS # BLD AUTO: 0.1 10E3/UL (ref 0–0.2)
BASOPHILS NFR BLD AUTO: 1 %
BILIRUB SERPL-MCNC: 0.5 MG/DL
BUN SERPL-MCNC: 19.5 MG/DL (ref 8–23)
CALCIUM SERPL-MCNC: 9.5 MG/DL (ref 8.8–10.2)
CHLORIDE SERPL-SCNC: 102 MMOL/L (ref 98–107)
CREAT SERPL-MCNC: 1.2 MG/DL (ref 0.67–1.17)
DEPRECATED HCO3 PLAS-SCNC: 23 MMOL/L (ref 22–29)
EGFRCR SERPLBLD CKD-EPI 2021: 63 ML/MIN/1.73M2
EOSINOPHIL # BLD AUTO: 0.2 10E3/UL (ref 0–0.7)
EOSINOPHIL NFR BLD AUTO: 2 %
ERYTHROCYTE [DISTWIDTH] IN BLOOD BY AUTOMATED COUNT: 13.1 % (ref 10–15)
GLUCOSE SERPL-MCNC: 132 MG/DL (ref 70–99)
HCT VFR BLD AUTO: 41 % (ref 40–53)
HGB BLD-MCNC: 13.9 G/DL (ref 13.3–17.7)
HOLD SPECIMEN: NORMAL
IMM GRANULOCYTES # BLD: 0 10E3/UL
IMM GRANULOCYTES NFR BLD: 0 %
LYMPHOCYTES # BLD AUTO: 1.4 10E3/UL (ref 0.8–5.3)
LYMPHOCYTES NFR BLD AUTO: 16 %
MCH RBC QN AUTO: 31.7 PG (ref 26.5–33)
MCHC RBC AUTO-ENTMCNC: 33.9 G/DL (ref 31.5–36.5)
MCV RBC AUTO: 94 FL (ref 78–100)
MONOCYTES # BLD AUTO: 0.6 10E3/UL (ref 0–1.3)
MONOCYTES NFR BLD AUTO: 6 %
NEUTROPHILS # BLD AUTO: 6.5 10E3/UL (ref 1.6–8.3)
NEUTROPHILS NFR BLD AUTO: 75 %
NRBC # BLD AUTO: 0 10E3/UL
NRBC BLD AUTO-RTO: 0 /100
PLATELET # BLD AUTO: 215 10E3/UL (ref 150–450)
POTASSIUM SERPL-SCNC: 4.5 MMOL/L (ref 3.4–5.3)
PROT SERPL-MCNC: 6.9 G/DL (ref 6.4–8.3)
RBC # BLD AUTO: 4.38 10E6/UL (ref 4.4–5.9)
SODIUM SERPL-SCNC: 137 MMOL/L (ref 135–145)
WBC # BLD AUTO: 8.7 10E3/UL (ref 4–11)

## 2024-04-26 PROCEDURE — 69210 REMOVE IMPACTED EAR WAX UNI: CPT | Mod: RT | Performed by: EMERGENCY MEDICINE

## 2024-04-26 PROCEDURE — 99284 EMERGENCY DEPT VISIT MOD MDM: CPT | Mod: 25 | Performed by: EMERGENCY MEDICINE

## 2024-04-26 PROCEDURE — 69210 REMOVE IMPACTED EAR WAX UNI: CPT | Mod: RT

## 2024-04-26 PROCEDURE — 99284 EMERGENCY DEPT VISIT MOD MDM: CPT | Mod: 25

## 2024-04-26 PROCEDURE — 36415 COLL VENOUS BLD VENIPUNCTURE: CPT | Performed by: EMERGENCY MEDICINE

## 2024-04-26 PROCEDURE — 93005 ELECTROCARDIOGRAM TRACING: CPT | Mod: XU

## 2024-04-26 PROCEDURE — 80053 COMPREHEN METABOLIC PANEL: CPT | Performed by: EMERGENCY MEDICINE

## 2024-04-26 PROCEDURE — 93010 ELECTROCARDIOGRAM REPORT: CPT | Performed by: INTERNAL MEDICINE

## 2024-04-26 PROCEDURE — 85025 COMPLETE CBC W/AUTO DIFF WBC: CPT | Performed by: EMERGENCY MEDICINE

## 2024-04-26 ASSESSMENT — COLUMBIA-SUICIDE SEVERITY RATING SCALE - C-SSRS
6. HAVE YOU EVER DONE ANYTHING, STARTED TO DO ANYTHING, OR PREPARED TO DO ANYTHING TO END YOUR LIFE?: NO
2. HAVE YOU ACTUALLY HAD ANY THOUGHTS OF KILLING YOURSELF IN THE PAST MONTH?: NO
1. IN THE PAST MONTH, HAVE YOU WISHED YOU WERE DEAD OR WISHED YOU COULD GO TO SLEEP AND NOT WAKE UP?: NO

## 2024-04-26 ASSESSMENT — ACTIVITIES OF DAILY LIVING (ADL)
ADLS_ACUITY_SCORE: 35

## 2024-04-27 LAB
ATRIAL RATE - MUSE: NORMAL BPM
DIASTOLIC BLOOD PRESSURE - MUSE: NORMAL MMHG
INTERPRETATION ECG - MUSE: NORMAL
P AXIS - MUSE: NORMAL DEGREES
PR INTERVAL - MUSE: NORMAL MS
QRS DURATION - MUSE: 80 MS
QT - MUSE: 382 MS
QTC - MUSE: 406 MS
R AXIS - MUSE: -4 DEGREES
SYSTOLIC BLOOD PRESSURE - MUSE: NORMAL MMHG
T AXIS - MUSE: 42 DEGREES
VENTRICULAR RATE- MUSE: 68 BPM

## 2024-04-27 ASSESSMENT — ENCOUNTER SYMPTOMS
CHILLS: 0
FEVER: 0
SHORTNESS OF BREATH: 0

## 2024-04-27 NOTE — ED NOTES
"Pt ambulated to room2 w family. Gait is dteady. Pt states he feels better than he did earlier, \"very little\" dozziness. Pt states the room would spin past him. This has been going on since ealier this after noon. Pt admits to some right ear pain and sinus congestion. Also admits to bloating and gas.   Denies n/v/fevers/ fred  s/s.   "

## 2024-04-27 NOTE — ED PROVIDER NOTES
History     Chief Complaint   Patient presents with    Dizziness     HPI  Dakota Gaston is a 73 year old male who is here with dizziness.  Intermittent.  Felt like the room spinning temporarily.  Also has some sinus congestion which is more or less chronic.  Was a bit concerned his blood pressure was low, 113/53.    Allergies:  Allergies   Allergen Reactions    Penicillin G        Problem List:    Patient Active Problem List    Diagnosis Date Noted    Benign essential HTN 09/06/2017     Priority: Medium    Sepsis due to Enterococcus (H) 09/06/2017     Priority: Medium    Urinary tract infection, site not specified 09/03/2017     Priority: Medium    Influenza B 02/15/2015     Priority: Medium     Class: Acute    Subtherapeutic international normalized ratio (INR) 02/14/2015     Priority: Medium     Diagnosis updated by automated process. Provider to review and confirm.      Atrial fibrillation (H) 02/14/2015     Priority: Medium    Atypical chest pain 02/14/2015     Priority: Medium    Renal insufficiency 02/14/2015     Priority: Medium    Elevated brain natriuretic peptide (BNP) level 02/14/2015     Priority: Medium    History of colon polyps 04/05/2013     Priority: Medium        Past Medical History:    Past Medical History:   Diagnosis Date    Arrhythmia     Atrial fibrillation (H)     Coronary artery disease     Dyspnea on exertion     Gastro-oesophageal reflux disease     Hypertension     Sleep apnea     Spondylosis of cervical joint     Stented coronary artery     Tubular adenoma        Past Surgical History:    Past Surgical History:   Procedure Laterality Date    angioplasty with stenting[      arthroscopy left knee[      meniscus tear.    ARTHROTOMY SHOULDER, ROTATOR CUFF REPAIR, COMBINED      spur removed, right shoulder.    CARDIAC SURGERY      COLONOSCOPY      COLONOSCOPY  4/5/2013    Procedure: COLONOSCOPY;  colonoscopy with Polypectomy;  Surgeon: Vickey Martinez MD;  Location: [facility]     COLONOSCOPY N/A 2015    Procedure: COLONOSCOPY;  Surgeon: Vickey Martinez MD;  Location: [facility]    deviated septum repair[      discectomy and fusion[      C3-C6    ENT SURGERY      tube placement in ears[         Family History:    Family History   Problem Relation Age of Onset    Lung Cancer Father     Aneurysm Father     Chronic Obstructive Pulmonary Disease Father     Heart Disease Father     Cancer Father     Hypertension Mother     Cerebrovascular Disease Mother     Mental Illness Mother     Lung Cancer Brother         x2    Pancreatic Cancer Brother     Pancreatic Cancer Sister        Social History:  Marital Status:   [4]  Social History     Tobacco Use    Smoking status: Former     Current packs/day: 0.00     Types: Cigarettes     Quit date: 2003     Years since quittin.0    Smokeless tobacco: Never   Substance Use Topics    Alcohol use: No     Comment: rona    Drug use: No        Medications:    atorvastatin (LIPITOR) 40 MG tablet  clopidogrel (PLAVIX) 75 MG tablet  losartan (COZAAR) 25 MG tablet  metoprolol succinate ER (TOPROL-XL) 200 MG 24 hr tablet  montelukast (SINGULAIR) 10 MG tablet  nitroglycerin (NITROSTAT) 0.4 MG SL tablet  OMEPRAZOLE PO  SIMVASTATIN PO  warfarin (COUMADIN) 5 MG tablet          Review of Systems   Constitutional:  Negative for chills and fever.   Respiratory:  Negative for shortness of breath.    Cardiovascular:  Negative for chest pain.   All other systems reviewed and are negative.      Physical Exam   BP: 156/94  Pulse: 75  Temp: 97.2  F (36.2  C)  Resp: 18  Weight: 119.2 kg (262 lb 12.6 oz)  SpO2: 97 %      Physical Exam  Constitutional:       General: He is not in acute distress.     Appearance: Normal appearance.   HENT:      Head: Normocephalic and atraumatic.      Right Ear: External ear normal. There is impacted cerumen.      Left Ear: External ear normal. There is no impacted cerumen.      Nose: Nose normal. No rhinorrhea.   Eyes:       Conjunctiva/sclera: Conjunctivae normal.   Cardiovascular:      Rate and Rhythm: Normal rate and regular rhythm.      Pulses: Normal pulses.   Pulmonary:      Effort: Pulmonary effort is normal. No respiratory distress.      Breath sounds: Normal breath sounds.   Abdominal:      General: There is no distension.      Palpations: Abdomen is soft.      Tenderness: There is no abdominal tenderness.   Musculoskeletal:         General: No deformity or signs of injury.   Skin:     General: Skin is warm and dry.      Capillary Refill: Capillary refill takes less than 2 seconds.   Neurological:      General: No focal deficit present.      Mental Status: He is alert. Mental status is at baseline.      Cranial Nerves: No cranial nerve deficit.      Sensory: No sensory deficit.      Motor: No weakness.      Coordination: Coordination normal.      Gait: Gait normal.   Psychiatric:         Mood and Affect: Mood normal.         Behavior: Behavior normal.         ED Course        [facility]    Foreign Body Removal    Date/Time: 4/27/2024 4:21 AM    Performed by: Yuval Garcia MD  Authorized by: Yuval Garcia MD    Risks, benefits and alternatives discussed.      LOCATION     Location:  Ear    Ear location:  R ear    Tendon involvement:  None    PROCEDURE TYPE     Procedure complexity:  Simple    PROCEDURE DETAILS     Removal mechanism:  Forceps and irrigation    Foreign bodies recovered:  5 or more    Description:  Cerumen  POST-PROCEDURE DETAILS     Neurovascular status: intact      Confirmation:  Residual foreign bodies remain    Patient tolerance of procedure:  Patient tolerated the procedure well with no immediate complications      PROCEDURE    Patient Tolerance:  Patient tolerated the procedure well with no immediate complications               Critical Care time:               Results for orders placed or performed during the hospital encounter of 04/26/24 (from the past 24 hour(s))   EKG 12-lead, tracing  only   Result Value Ref Range    Systolic Blood Pressure  mmHg    Diastolic Blood Pressure  mmHg    Ventricular Rate 68 BPM    Atrial Rate  BPM    MI Interval  ms    QRS Duration 80 ms     ms    QTc 406 ms    P Axis  degrees    R AXIS -4 degrees    T Axis 42 degrees    Interpretation ECG       Atrial fibrillation  Abnormal ECG  No previous ECGs available     [place] Draw    Narrative    The following orders were created for panel order [place] Draw.  Procedure                               Abnormality         Status                     ---------                               -----------         ------                     Extra Blue Top Tube[[id number]]                              Final result               Extra Red Top Tube[[id number]]                               Final result               Extra Green Top (Lithium...[[id number]]                      Final result               Extra Purple Top Tube[[id number]]                            Final result               Extra Heparinized Syringe[[id number]]                        Final result                 Please view results for these tests on the individual orders.   Extra Blue Top Tube   Result Value Ref Range    Hold Specimen JIC    Extra Red Top Tube   Result Value Ref Range    Hold Specimen JIC    Extra Green Top (Lithium Heparin) Tube   Result Value Ref Range    Hold Specimen JIC    Extra Purple Top Tube   Result Value Ref Range    Hold Specimen JIC    Extra Heparinized Syringe   Result Value Ref [facility]    Hold Specimen JIC    CBC with platelets differential    Narrative    The following orders were created for panel order CBC with platelets differential.  Procedure                               Abnormality         Status                     ---------                               -----------         ------                     CBC with platelets and d...[[id number]]  Abnormal            Final result                 Please view results for these tests on the  individual orders.   Comprehensive metabolic panel   Result Value Ref [facility]    Sodium 137 135 - 145 mmol/L    Potassium 4.5 3.4 - 5.3 mmol/L    Carbon Dioxide (CO2) 23 22 - 29 mmol/L    Anion Gap 12 7 - 15 mmol/L    Urea Nitrogen 19.5 8.0 - 23.0 mg/dL    Creatinine 1.20 (H) 0.67 - 1.17 mg/dL    GFR Estimate 63 >60 mL/min/1.73m2    Calcium 9.5 8.8 - 10.2 mg/dL    Chloride 102 98 - 107 mmol/L    Glucose 132 (H) 70 - 99 mg/dL    Alkaline Phosphatase 40 40 - 150 U/L    AST 29 0 - 45 U/L    ALT 37 0 - 70 U/L    Protein Total 6.9 6.4 - 8.3 g/dL    Albumin 4.2 3.5 - 5.2 g/dL    Bilirubin Total 0.5 <=1.2 mg/dL   CBC with platelets and differential   Result Value Ref [facility]    WBC Count 8.7 4.0 - 11.0 10e3/uL    RBC Count 4.38 (L) 4.40 - 5.90 10e6/uL    Hemoglobin 13.9 13.3 - 17.7 g/dL    Hematocrit 41.0 40.0 - 53.0 %    MCV 94 78 - 100 fL    MCH 31.7 26.5 - 33.0 pg    MCHC 33.9 31.5 - 36.5 g/dL    RDW 13.1 10.0 - 15.0 %    Platelet Count 215 150 - 450 10e3/uL    % Neutrophils 75 %    % Lymphocytes 16 %    % Monocytes 6 %    % Eosinophils 2 %    % Basophils 1 %    % Immature Granulocytes 0 %    NRBCs per 100 WBC 0 <1 /100    Absolute Neutrophils 6.5 1.6 - 8.3 10e3/uL    Absolute Lymphocytes 1.4 0.8 - 5.3 10e3/uL    Absolute Monocytes 0.6 0.0 - 1.3 10e3/uL    Absolute Eosinophils 0.2 0.0 - 0.7 10e3/uL    Absolute Basophils 0.1 0.0 - 0.2 10e3/uL    Absolute Immature Granulocytes 0.0 <=0.4 10e3/uL    Absolute NRBCs 0.0 10e3/uL       Medications - No data to display    Assessments & Plan (with Medical Decision Making)     I have reviewed the nursing notes.    I have reviewed the findings, diagnosis, plan and need for follow up with the patient.          Medical Decision Making  The patient's presentation was of moderate complexity (an acute illness with systemic symptoms).    The patient's evaluation involved:  ordering and/or review of 3+ test(s) in this encounter (see separate area of note for details)    The patient's  management necessitated moderate risk (a decision regarding minor procedure (foreign body removal) with risk factors of none).    73-year-old male here with intermittent dizziness, reported low blood pressure, and stomachache.  When I interviewed patient, did not complain of any stomach pain.  Dizziness had resolved.  Patient reported improved hearing after cerumen was removed.  I suspect cerumen impaction was causing his intermittent sensation of the room spinning.  Was asymptomatic at time of discharge.    Discharge Medication List as of 4/27/2024 12:36 AM          Final diagnoses:   Impacted cerumen of right ear       4/26/2024   [facility]       Yuval Garcia MD  04/27/24 0421

## 2024-04-27 NOTE — ED NOTES
"Ear flushed with sterile NS after a 10 minute soak with H202. Pt tolerating well. Several dark brown wax pieces out with every flush.  Less wax seen after flushes.  Suddenly pt plugs his nose and blows and \"whistles\" out his right ear, pt states he has a hole in his ear and does this frequently. States he gets pressure relief with it.  Provider updated   "

## 2024-04-27 NOTE — ED TRIAGE NOTES
"Pt reports lightheaded for the past couple hours with intermittent room spinning. Pt also reports a \"low blood pressure\" of 113/53 at home. Pt reports some aching started in his stomach a little while ago. No NVD. No fevers. Pt denies exposure to recent illness. Pt is reports some sinus congestion. Pt has intermittent ringing in his ears, denies current sx. Pt denies hx of vertigo. BEFAST negative in triage, gait steady, no visual disturbances.         "

## 2024-09-19 ENCOUNTER — TRANSFERRED RECORDS (OUTPATIENT)
Dept: HEALTH INFORMATION MANAGEMENT | Facility: CLINIC | Age: 74
End: 2024-09-19

## 2024-10-29 ENCOUNTER — TRANSFERRED RECORDS (OUTPATIENT)
Dept: HEALTH INFORMATION MANAGEMENT | Facility: CLINIC | Age: 74
End: 2024-10-29

## 2025-01-13 ENCOUNTER — TRANSFERRED RECORDS (OUTPATIENT)
Dept: HEALTH INFORMATION MANAGEMENT | Facility: CLINIC | Age: 75
End: 2025-01-13

## 2025-01-15 ENCOUNTER — TELEPHONE (OUTPATIENT)
Dept: FAMILY MEDICINE | Facility: OTHER | Age: 75
End: 2025-01-15

## 2025-01-15 NOTE — TELEPHONE ENCOUNTER
8:50 AM    Reason for Call: Phone Call    Description: he wants to know if he can est with Dr Alba/his sister and grandchildren see him/his primary is moving    Was an appointment offered for this call? No  If yes : Appointment type              Date    Preferred method for responding to this message: Telephone Call  What is your phone number ? [phone]     If we cannot reach you directly, may we leave a detailed response at the number you provided? Yes    Can this message wait until your PCP/provider returns, if available today? Not applicable    Daksha Valdez

## 2025-01-23 PROBLEM — D12.6 ADENOMATOUS POLYP OF COLON: Status: ACTIVE | Noted: 2025-01-23

## 2025-02-16 NOTE — PROVIDER NOTIFICATION
Notified MD of pts Temp of 101.8 and having rigors with Tylenol not due for 1 hour.   MD to put orders in for Ibuprofen.     N/A Patient is under age 18 and does not have a history of high risk behavior or is not high risk for Hep C

## 2025-02-17 ENCOUNTER — TELEPHONE (OUTPATIENT)
Dept: FAMILY MEDICINE | Facility: OTHER | Age: 75
End: 2025-02-17

## 2025-02-17 ENCOUNTER — ANCILLARY PROCEDURE (OUTPATIENT)
Dept: GENERAL RADIOLOGY | Facility: OTHER | Age: 75
End: 2025-02-17
Attending: FAMILY MEDICINE
Payer: MEDICARE

## 2025-02-17 ENCOUNTER — OFFICE VISIT (OUTPATIENT)
Dept: FAMILY MEDICINE | Facility: OTHER | Age: 75
End: 2025-02-17
Attending: FAMILY MEDICINE
Payer: MEDICARE

## 2025-02-17 VITALS
HEART RATE: 97 BPM | TEMPERATURE: 99.7 F | SYSTOLIC BLOOD PRESSURE: 114 MMHG | WEIGHT: 256.8 LBS | BODY MASS INDEX: 36.76 KG/M2 | DIASTOLIC BLOOD PRESSURE: 54 MMHG | HEIGHT: 70 IN | OXYGEN SATURATION: 92 %

## 2025-02-17 DIAGNOSIS — I48.91 ATRIAL FIBRILLATION, UNSPECIFIED TYPE (H): ICD-10-CM

## 2025-02-17 DIAGNOSIS — J22 LRTI (LOWER RESPIRATORY TRACT INFECTION): ICD-10-CM

## 2025-02-17 DIAGNOSIS — H65.91 OME (OTITIS MEDIA WITH EFFUSION), RIGHT: ICD-10-CM

## 2025-02-17 DIAGNOSIS — J22 LRTI (LOWER RESPIRATORY TRACT INFECTION): Primary | ICD-10-CM

## 2025-02-17 LAB
BASOPHILS # BLD AUTO: 0 10E3/UL (ref 0–0.2)
BASOPHILS NFR BLD AUTO: 1 %
EOSINOPHIL # BLD AUTO: 0.1 10E3/UL (ref 0–0.7)
EOSINOPHIL NFR BLD AUTO: 2 %
ERYTHROCYTE [DISTWIDTH] IN BLOOD BY AUTOMATED COUNT: 12.7 % (ref 10–15)
FLUAV RNA SPEC QL NAA+PROBE: POSITIVE
FLUBV RNA RESP QL NAA+PROBE: NEGATIVE
HCT VFR BLD AUTO: 40.8 % (ref 40–53)
HGB BLD-MCNC: 13.9 G/DL (ref 13.3–17.7)
IMM GRANULOCYTES # BLD: 0 10E3/UL
IMM GRANULOCYTES NFR BLD: 0 %
LYMPHOCYTES # BLD AUTO: 0.9 10E3/UL (ref 0.8–5.3)
LYMPHOCYTES NFR BLD AUTO: 15 %
MCH RBC QN AUTO: 31.6 PG (ref 26.5–33)
MCHC RBC AUTO-ENTMCNC: 34.1 G/DL (ref 31.5–36.5)
MCV RBC AUTO: 93 FL (ref 78–100)
MONOCYTES # BLD AUTO: 0.8 10E3/UL (ref 0–1.3)
MONOCYTES NFR BLD AUTO: 13 %
NEUTROPHILS # BLD AUTO: 4.4 10E3/UL (ref 1.6–8.3)
NEUTROPHILS NFR BLD AUTO: 70 %
PLATELET # BLD AUTO: 175 10E3/UL (ref 150–450)
RBC # BLD AUTO: 4.4 10E6/UL (ref 4.4–5.9)
RSV RNA SPEC NAA+PROBE: NEGATIVE
SARS-COV-2 RNA RESP QL NAA+PROBE: NEGATIVE
WBC # BLD AUTO: 6.3 10E3/UL (ref 4–11)

## 2025-02-17 PROCEDURE — 36415 COLL VENOUS BLD VENIPUNCTURE: CPT | Mod: ZL | Performed by: FAMILY MEDICINE

## 2025-02-17 PROCEDURE — 87637 SARSCOV2&INF A&B&RSV AMP PRB: CPT | Mod: ZL | Performed by: FAMILY MEDICINE

## 2025-02-17 PROCEDURE — 71046 X-RAY EXAM CHEST 2 VIEWS: CPT | Mod: TC,FY

## 2025-02-17 PROCEDURE — G0463 HOSPITAL OUTPT CLINIC VISIT: HCPCS | Mod: 25

## 2025-02-17 PROCEDURE — 99214 OFFICE O/P EST MOD 30 MIN: CPT | Performed by: FAMILY MEDICINE

## 2025-02-17 PROCEDURE — 85025 COMPLETE CBC W/AUTO DIFF WBC: CPT | Mod: ZL | Performed by: FAMILY MEDICINE

## 2025-02-17 PROCEDURE — G2211 COMPLEX E/M VISIT ADD ON: HCPCS | Performed by: FAMILY MEDICINE

## 2025-02-17 RX ORDER — LEVOCETIRIZINE DIHYDROCHLORIDE 5 MG/1
1 TABLET, FILM COATED ORAL
COMMUNITY
Start: 2025-01-20

## 2025-02-17 RX ORDER — LOSARTAN POTASSIUM 100 MG/1
1 TABLET ORAL
COMMUNITY
Start: 2025-01-20

## 2025-02-17 RX ORDER — AMLODIPINE BESYLATE 5 MG/1
5 TABLET ORAL DAILY
COMMUNITY
Start: 2025-02-16

## 2025-02-17 RX ORDER — GABAPENTIN 300 MG/1
300 CAPSULE ORAL 2 TIMES DAILY
COMMUNITY
Start: 2024-08-22

## 2025-02-17 RX ORDER — ASPIRIN 81 MG/1
81 TABLET ORAL DAILY
COMMUNITY

## 2025-02-17 RX ORDER — LEVOFLOXACIN 500 MG/1
500 TABLET, FILM COATED ORAL DAILY
Qty: 7 TABLET | Refills: 0 | Status: SHIPPED | OUTPATIENT
Start: 2025-02-17

## 2025-02-17 ASSESSMENT — PAIN SCALES - GENERAL: PAINLEVEL_OUTOF10: MODERATE PAIN (4)

## 2025-02-17 NOTE — PROGRESS NOTES
"  Assessment & Plan     LRTI (lower respiratory tract infection)  With wheeze.  No obvious pneumonia on xray.  Sats borderline.  Right otitis.  Levaquin coverage to cover the possibility of bacterial component in the lungs.  Cbc stable.    - XR CHEST 2 VW (Clinic Performed); Future  - CBC with platelets and differential; Future  - Influenza A/B, RSV and SARS-CoV2 PCR (COVID-19); Future      (H65.91) OME (otitis media with effusion), right  Comment: as above.    Plan: as above.      (I48.91) Atrial fibrillation, unspecified type (H)  Comment: nice review.    Plan: Anticoagulation Clinic Referral        Sent to coumadin clinic.  Follow.  On levaquin so will need level while on.        \    We have followup scheduled in 10 days.  See discussion above.  I became his doctor but also addressed his acute illness.        BMI  Estimated body mass index is 36.85 kg/m  as calculated from the following:    Height as of this encounter: 1.778 m (5' 10\").    Weight as of this encounter: 116.5 kg (256 lb 12.8 oz).             No follow-ups on file.    Jackie Duncan is a 74 year old, presenting for the following health issues:  Cold Symptoms        2/17/2025     1:06 PM   Additional Questions   Roomed by Liset COLLINS     History of Present Illness       Reason for visit:  Illness  Symptom onset:  3-7 days ago   He is taking medications regularly.       Acute Illness  Onset/Duration: x 4 days  Symptoms:  Fever: YES- didn't have a thermometer but felt like he had fevers  Chills/Sweats: YES  Headache (location?): YES  Sinus Pressure: YES  Conjunctivitis:  YES  Ear Pain: YES: right  Rhinorrhea: YES  Congestion: YES  Sore Throat: YES  Cough: YES  Wheeze: YES  Decreased Appetite: YES  Nausea: No  Vomiting: No  Diarrhea: YES  Dysuria/Freq.: No  Dysuria or Hematuria: No  Fatigue/Achiness: YES  Sick/Strep Exposure: No  Therapies tried and outcome: tylenol, nyquil        Review of Systems  Constitutional, HEENT, cardiovascular, pulmonary, " "gi and gu systems are negative, except as otherwise noted.      Objective    /54 (BP Location: Left arm, Patient Position: Sitting, Cuff Size: Adult Regular)   Pulse 97   Temp 99.7  F (37.6  C) (Tympanic)   Ht 1.778 m (5' 10\")   Wt 116.5 kg (256 lb 12.8 oz)   SpO2 92%   BMI 36.85 kg/m    Body mass index is 36.85 kg/m .  Physical Exam   GENERAL: alert and no distress  EYES: Eyes grossly normal to inspection, PERRL and conjunctivae and sclerae normal  HENT: ear canals and TM's normal, nose and mouth without ulcers or lesions  NECK: no adenopathy, no asymmetry, masses, or scars  RESP: lungs clear to auscultation - no rales, rhonchi or wheezes and expiratory wheezes R upper posterior and R mid posterior  CV: regular rate and rhythm, normal S1 S2, no S3 or S4, no murmur, click or rub, no peripheral edema  ABDOMEN: soft, nontender, no hepatosplenomegaly, no masses and bowel sounds normal  MS: no gross musculoskeletal defects noted, no edema    CXR - Reviewed and interpreted by me     Cbc shows    Viral swab pending.          Signed Electronically by: Nestor Alba MD    "

## 2025-02-17 NOTE — TELEPHONE ENCOUNTER
8:10 AM    Reason for Call: OVERBOOK    Patient is having the following symptoms: Colugh, congestion, dizziness, stomach   for  4-5 days.  COVID Negative    The patient is requesting an appointment for Today with Dr. Alba.    Was an appointment offered for this call? No  If yes : Appointment type              Date    Preferred method for responding to this message: Telephone Call  What is your phone number ?  [phone]     If we cannot reach you directly, may we leave a detailed response at the number you provided? Yes    Can this message wait until your PCP/provider returns, if unavailable today? Not applicable    Sara Amos

## 2025-02-18 ENCOUNTER — TELEPHONE (OUTPATIENT)
Dept: FAMILY MEDICINE | Facility: OTHER | Age: 75
End: 2025-02-18

## 2025-02-18 DIAGNOSIS — I48.91 ATRIAL FIBRILLATION, UNSPECIFIED TYPE (H): Primary | ICD-10-CM

## 2025-02-18 DIAGNOSIS — Z79.01 LONG TERM CURRENT USE OF ANTICOAGULANT THERAPY: ICD-10-CM

## 2025-02-18 NOTE — TELEPHONE ENCOUNTER
ANTICOAGULATION  MANAGEMENT     Interacting Medication Review    Interacting medication(s): Levofloxacin (Levaquin) with warfarin.    Duration: 7 days (2/17 to 2/23)    Indication: Influenza    New medication?: Yes, interaction may increase INR and risk of bleeding. Closer INR monitoring recommended.        PLAN     Continue your current warfarin dose with next INR in 1 day        [facility] is new [facility] will be establishing care with Dr Alba 2/25. [facility] previously managed INR.     Telephone call with Dakota who verbalizes understanding and agrees to plan    New recheck date updated on anticoagulation calendar     No change to [facility] priority; patient stable on interacting medication    Plan made per [facility] anticoagulation protocol    Keara Bowie, RN  2/18/2025  Anticoagulation Clinic  [employer] for routing messages: martín [facility] [place]  [facility] patient phone line:[phone]

## 2025-02-19 ENCOUNTER — LAB (OUTPATIENT)
Dept: LAB | Facility: OTHER | Age: 75
End: 2025-02-19
Payer: MEDICARE

## 2025-02-19 ENCOUNTER — ANTICOAGULATION THERAPY VISIT (OUTPATIENT)
Dept: ANTICOAGULATION | Facility: OTHER | Age: 75
End: 2025-02-19
Attending: FAMILY MEDICINE
Payer: MEDICARE

## 2025-02-19 DIAGNOSIS — Z79.01 LONG TERM CURRENT USE OF ANTICOAGULANT THERAPY: ICD-10-CM

## 2025-02-19 DIAGNOSIS — I48.91 ATRIAL FIBRILLATION, UNSPECIFIED TYPE (H): ICD-10-CM

## 2025-02-19 DIAGNOSIS — I48.91 ATRIAL FIBRILLATION, UNSPECIFIED TYPE (H): Primary | ICD-10-CM

## 2025-02-19 LAB — INR BLD: 2.8 (ref 0.9–1.1)

## 2025-02-19 PROCEDURE — 85610 PROTHROMBIN TIME: CPT | Mod: ZL

## 2025-02-19 PROCEDURE — 36416 COLLJ CAPILLARY BLOOD SPEC: CPT | Mod: ZL

## 2025-02-19 NOTE — PROGRESS NOTES
ANTICOAGULATION MANAGEMENT     Dakota Gaston 74 year old male is on warfarin with therapeutic INR result. (Goal INR 2.0-3.0)    Recent labs: (last 7 days)     02/19/25  0903   INR 2.8*       ASSESSMENT     Source(s): Chart Review     Warfarin doses taken: Reviewed in chart  Diet: No new diet changes identified  Medication/supplement changes:  Levaquin started 2/17 x 7 days  New illness, injury, or hospitalization: Yes: URI sx  Signs or symptoms of bleeding or clotting: No  Previous result:  patient is new to us from [facility]  Additional findings: None       PLAN     Recommended plan for temporary change(s) affecting INR     Dosing Instructions: Continue your current warfarin dose with next INR in 6 days       Summary  As of 2/19/2025      Full warfarin instructions:  2.5 mg every Tue, Wed, Thu; 5 mg all other days   Next INR check:  2/25/2025               Detailed voice message left for Dakota with dosing instructions and follow up date.     Check at provider office visit    Education provided: Please call back if any changes to your diet, medications or how you've been taking warfarin    Plan made per [facility] anticoagulation protocol    Keara Bowie RN  2/19/2025  Anticoagulation Clinic  [employer] for routing messages: martín BAUER  [facility] patient phone line: [phone]        _______________________________________________________________________     Anticoagulation Episode Summary       Current INR goal:  2.0-3.0   TTR:  --   Target end date:  Indefinite   Send INR reminders to:  JAREK BAUER    Indications    Atrial fibrillation  unspecified type (H) [I48.91]             Comments:  --             Anticoagulation Care Providers       Provider Role Specialty Phone number    Nestor Alba MD Referring Family Medicine [phone]

## 2025-02-25 ENCOUNTER — ANTICOAGULATION THERAPY VISIT (OUTPATIENT)
Dept: ANTICOAGULATION | Facility: OTHER | Age: 75
End: 2025-02-25
Payer: COMMERCIAL

## 2025-02-25 ENCOUNTER — OFFICE VISIT (OUTPATIENT)
Dept: FAMILY MEDICINE | Facility: OTHER | Age: 75
End: 2025-02-25
Attending: FAMILY MEDICINE
Payer: MEDICARE

## 2025-02-25 VITALS
OXYGEN SATURATION: 94 % | RESPIRATION RATE: 14 BRPM | HEART RATE: 66 BPM | DIASTOLIC BLOOD PRESSURE: 54 MMHG | BODY MASS INDEX: 37.05 KG/M2 | HEIGHT: 70 IN | SYSTOLIC BLOOD PRESSURE: 128 MMHG | TEMPERATURE: 98 F | WEIGHT: 258.8 LBS

## 2025-02-25 DIAGNOSIS — J42 CHRONIC BRONCHITIS, UNSPECIFIED CHRONIC BRONCHITIS TYPE (H): ICD-10-CM

## 2025-02-25 DIAGNOSIS — R06.02 SOB (SHORTNESS OF BREATH) ON EXERTION: ICD-10-CM

## 2025-02-25 DIAGNOSIS — Z00.00 MEDICARE ANNUAL WELLNESS VISIT, SUBSEQUENT: Primary | ICD-10-CM

## 2025-02-25 DIAGNOSIS — Z13.220 LIPID SCREENING: ICD-10-CM

## 2025-02-25 DIAGNOSIS — I25.10 CORONARY ARTERY DISEASE INVOLVING NATIVE HEART WITHOUT ANGINA PECTORIS, UNSPECIFIED VESSEL OR LESION TYPE: ICD-10-CM

## 2025-02-25 DIAGNOSIS — I48.91 ATRIAL FIBRILLATION, UNSPECIFIED TYPE (H): ICD-10-CM

## 2025-02-25 DIAGNOSIS — E66.812 CLASS 2 SEVERE OBESITY WITH BODY MASS INDEX (BMI) OF 35 TO 39.9 WITH SERIOUS COMORBIDITY (H): ICD-10-CM

## 2025-02-25 DIAGNOSIS — E66.01 CLASS 2 SEVERE OBESITY WITH BODY MASS INDEX (BMI) OF 35 TO 39.9 WITH SERIOUS COMORBIDITY (H): ICD-10-CM

## 2025-02-25 DIAGNOSIS — I48.91 ATRIAL FIBRILLATION, UNSPECIFIED TYPE (H): Primary | ICD-10-CM

## 2025-02-25 DIAGNOSIS — Z12.11 SPECIAL SCREENING FOR MALIGNANT NEOPLASMS, COLON: ICD-10-CM

## 2025-02-25 LAB
ALBUMIN SERPL BCG-MCNC: 4 G/DL (ref 3.5–5.2)
ALP SERPL-CCNC: 34 U/L (ref 40–150)
ALT SERPL W P-5'-P-CCNC: 29 U/L (ref 0–70)
ANION GAP SERPL CALCULATED.3IONS-SCNC: 12 MMOL/L (ref 7–15)
AST SERPL W P-5'-P-CCNC: 42 U/L (ref 0–45)
BASOPHILS # BLD AUTO: 0.1 10E3/UL (ref 0–0.2)
BASOPHILS NFR BLD AUTO: 1 %
BILIRUB SERPL-MCNC: 0.7 MG/DL
BUN SERPL-MCNC: 15.8 MG/DL (ref 8–23)
CALCIUM SERPL-MCNC: 9.5 MG/DL (ref 8.8–10.4)
CHLORIDE SERPL-SCNC: 104 MMOL/L (ref 98–107)
CHOLEST SERPL-MCNC: 114 MG/DL
CREAT SERPL-MCNC: 1.31 MG/DL (ref 0.67–1.17)
EGFRCR SERPLBLD CKD-EPI 2021: 57 ML/MIN/1.73M2
EOSINOPHIL # BLD AUTO: 0.2 10E3/UL (ref 0–0.7)
EOSINOPHIL NFR BLD AUTO: 2 %
ERYTHROCYTE [DISTWIDTH] IN BLOOD BY AUTOMATED COUNT: 12.3 % (ref 10–15)
FASTING STATUS PATIENT QL REPORTED: YES
FASTING STATUS PATIENT QL REPORTED: YES
GLUCOSE SERPL-MCNC: 111 MG/DL (ref 70–99)
HCO3 SERPL-SCNC: 23 MMOL/L (ref 22–29)
HCT VFR BLD AUTO: 39.2 % (ref 40–53)
HDLC SERPL-MCNC: 32 MG/DL
HGB BLD-MCNC: 13.4 G/DL (ref 13.3–17.7)
IMM GRANULOCYTES # BLD: 0 10E3/UL
IMM GRANULOCYTES NFR BLD: 0 %
INR PPP: 4.13 (ref 0.85–1.15)
LDLC SERPL CALC-MCNC: 37 MG/DL
LYMPHOCYTES # BLD AUTO: 1.3 10E3/UL (ref 0.8–5.3)
LYMPHOCYTES NFR BLD AUTO: 17 %
MCH RBC QN AUTO: 31.5 PG (ref 26.5–33)
MCHC RBC AUTO-ENTMCNC: 34.2 G/DL (ref 31.5–36.5)
MCV RBC AUTO: 92 FL (ref 78–100)
MONOCYTES # BLD AUTO: 0.8 10E3/UL (ref 0–1.3)
MONOCYTES NFR BLD AUTO: 10 %
NEUTROPHILS # BLD AUTO: 5.6 10E3/UL (ref 1.6–8.3)
NEUTROPHILS NFR BLD AUTO: 71 %
NONHDLC SERPL-MCNC: 82 MG/DL
PLATELET # BLD AUTO: 253 10E3/UL (ref 150–450)
POTASSIUM SERPL-SCNC: 4.7 MMOL/L (ref 3.4–5.3)
PROT SERPL-MCNC: 6.8 G/DL (ref 6.4–8.3)
RBC # BLD AUTO: 4.26 10E6/UL (ref 4.4–5.9)
SODIUM SERPL-SCNC: 139 MMOL/L (ref 135–145)
TRIGL SERPL-MCNC: 227 MG/DL
WBC # BLD AUTO: 7.9 10E3/UL (ref 4–11)

## 2025-02-25 PROCEDURE — 85610 PROTHROMBIN TIME: CPT | Mod: ZL | Performed by: FAMILY MEDICINE

## 2025-02-25 PROCEDURE — 36415 COLL VENOUS BLD VENIPUNCTURE: CPT | Mod: ZL | Performed by: FAMILY MEDICINE

## 2025-02-25 PROCEDURE — G0463 HOSPITAL OUTPT CLINIC VISIT: HCPCS

## 2025-02-25 PROCEDURE — 85018 HEMOGLOBIN: CPT | Mod: ZL | Performed by: FAMILY MEDICINE

## 2025-02-25 PROCEDURE — 80061 LIPID PANEL: CPT | Mod: ZL | Performed by: FAMILY MEDICINE

## 2025-02-25 PROCEDURE — 84155 ASSAY OF PROTEIN SERUM: CPT | Mod: ZL | Performed by: FAMILY MEDICINE

## 2025-02-25 PROCEDURE — 84132 ASSAY OF SERUM POTASSIUM: CPT | Mod: ZL | Performed by: FAMILY MEDICINE

## 2025-02-25 PROCEDURE — 85041 AUTOMATED RBC COUNT: CPT | Mod: ZL | Performed by: FAMILY MEDICINE

## 2025-02-25 PROCEDURE — 85004 AUTOMATED DIFF WBC COUNT: CPT | Mod: ZL | Performed by: FAMILY MEDICINE

## 2025-02-25 PROCEDURE — 82247 BILIRUBIN TOTAL: CPT | Mod: ZL | Performed by: FAMILY MEDICINE

## 2025-02-25 SDOH — HEALTH STABILITY: PHYSICAL HEALTH: ON AVERAGE, HOW MANY DAYS PER WEEK DO YOU ENGAGE IN MODERATE TO STRENUOUS EXERCISE (LIKE A BRISK WALK)?: 3 DAYS

## 2025-02-25 ASSESSMENT — SOCIAL DETERMINANTS OF HEALTH (SDOH): HOW OFTEN DO YOU GET TOGETHER WITH FRIENDS OR RELATIVES?: TWICE A WEEK

## 2025-02-25 ASSESSMENT — PAIN SCALES - GENERAL: PAINLEVEL_OUTOF10: NO PAIN (0)

## 2025-02-25 NOTE — PROGRESS NOTES
"Preventive Care Visit  [facility] [place]  Nestor Alba MD, Family Medicine  Feb 25, 2025      Assessment & Plan     Atrial fibrillation, unspecified type (H)  Ongoing and stable.  See below.  Stress test done.  Profound sob with activity.     - CBC with Platelets & Differential; Future  - Reflex INR; Future  - EKG 12-lead complete w/read - (Clinic Performed)  - NM MPI Treadmill; Future    Lipid screening  Update.    - Comprehensive metabolic panel; Future  - Lipid Profile; Future    Special screening for malignant neoplasms, colon  Update colon.  Sent.    - Adult GI  Referral - Procedure Only; Future    SOB (shortness of breath) on exertion  On exertion.  Very prominent.  Update stress test.  Known CAD     - EKG 12-lead complete w/read - (Clinic Performed)  - NM MPI Treadmill; Future    Medicare annual wellness visit, subsequent  Doing well.  Declines shots.  Follow routine.  Nice review.              BMI  Estimated body mass index is 37.13 kg/m  as calculated from the following:    Height as of this encounter: 1.778 m (5' 10\").    Weight as of this encounter: 117.4 kg (258 lb 12.8 oz).       Counseling  Appropriate preventive services were addressed with this patient via screening, questionnaire, or discussion as appropriate for fall prevention, nutrition, physical activity, Tobacco-use cessation, social engagement, weight loss and cognition.  Checklist reviewing preventive services available has been given to the patient.  Reviewed patient's diet, addressing concerns and/or questions.   He is at risk for lack of exercise and has been provided with information to increase physical activity for the benefit of his well-being.   The patient was provided with written information regarding signs of hearing loss.           No follow-ups on file.    Jackie Duncan is a 74 year old, presenting for the following:  Physical        2/25/2025    12:52 PM   Additional Questions   Roomed by Toya DIXON "           HPI    Acute Illness - Sinus problem  Acute illness concerns: Right ear fullness, sinus congestion  Onset/Duration: 1 week ago  Symptoms:  Fever: No  Chills/Sweats: No  Headache (location?): No  Sinus Pressure: YES  Conjunctivitis:  No/ watery  Ear Pain: YES: right  Rhinorrhea: YES  Congestion: YES  Sore Throat: No  Cough: Once in a while  Wheeze: Once in a while  Decreased Appetite: No  Nausea: No  Vomiting: No  Diarrhea: A little bit  Dysuria/Freq.: No  Dysuria or Hematuria: No  Fatigue/Achiness: YES, a little bit  Sick/Strep Exposure: No  Therapies tried and outcome: Antibiotic, Zicam    Health Care Directive  Patient does not have a Health Care Directive: Discussed advance care planning with patient; information given to patient to review.      2/25/2025   General Health   How would you rate your overall physical health? Good   Feel stress (tense, anxious, or unable to sleep) Not at all         2/25/2025   Nutrition   Diet: Regular (no restrictions)         2/25/2025   Exercise   Days per week of moderate/strenous exercise 3 days         2/25/2025   Social Factors   Frequency of gathering with friends or relatives Twice a week   Worry food won't last until get money to buy more No   Food not last or not have enough money for food? No   Do you have housing? (Housing is defined as stable permanent housing and does not include staying ouside in a car, in a tent, in an abandoned building, in an overnight shelter, or couch-surfing.) Yes   Are you worried about losing your housing? No   Lack of transportation? No   Unable to get utilities (heat,electricity)? No         2/25/2025   Fall Risk   Fallen 2 or more times in the past year? No   Trouble with walking or balance? No          2/25/2025   Activities of Daily Living- Home Safety   Needs help with the following daily activites None of the above   Safety concerns in the home None of the above         2/25/2025   Dental   Dentist two times every year? Yes          2025   Hearing Screening   Hearing concerns? (!) IT'S HARD TO FOLLOW A CONVERSATION IN A NOISY RESTAURANT OR CROWDED ROOM.         2025   Driving Risk Screening   Patient/family members have concerns about driving No         2025   General Alertness/Fatigue Screening   Have you been more tired than usual lately? No         2025   Urinary Incontinence Screening   Bothered by leaking urine in past 6 months No            Today's PHQ-2 Score:       2025     1:01 PM   PHQ-2 (  [employer])   Q1: Little interest or pleasure in doing things 0   Q2: Feeling down, depressed or hopeless 0   PHQ-2 Score 0    Q1: Little interest or pleasure in doing things Not at all   Q2: Feeling down, depressed or hopeless Not at all   PHQ-2 Score 0       Patient-reported           2025   Substance Use   Alcohol more than 3/day or more than 7/wk Not Applicable   Do you have a current opioid prescription? No   How severe/bad is pain from 1 to 10? /10   Do you use any other substances recreationally? No     Social History     Tobacco Use    Smoking status: Former     Current packs/day: 0.00     Types: Cigarettes     Quit date: 2003     Years since quittin.9    Smokeless tobacco: Never   Vaping Use    Vaping status: Never Used   Substance Use Topics    Alcohol use: No     Comment: rona    Drug use: No       ASCVD Risk   The 10-year ASCVD risk score (Cayla SOMERS, et al., 2019) is: 27.5%    Values used to calculate the score:      Age: 74 years      Sex: Male      Is Non- : No      Diabetic: No      Tobacco smoker: No      Systolic Blood Pressure: 128 mmHg      Is BP treated: Yes      HDL Cholesterol: 37 mg/dL      Total Cholesterol: 133 mg/dL            Reviewed and updated as needed this visit by Provider                    Past Medical History:   Diagnosis Date    Arrhythmia     A. fib.    Atrial fibrillation (H)     Coronary artery disease     Dyspnea on exertion      Gastro-oesophageal reflux disease     Hypertension     Sleep apnea     Spondylosis of cervical joint     Stented coronary artery     Tubular adenoma      Past Surgical History:   Procedure Laterality Date    angioplasty with stenting[      arthroscopy left knee[      meniscus tear.    ARTHROTOMY SHOULDER, ROTATOR CUFF REPAIR, COMBINED      spur removed, right shoulder.    CARDIAC SURGERY      COLONOSCOPY      COLONOSCOPY  04/05/2013    Procedure: COLONOSCOPY;  colonoscopy with Polypectomy;  Surgeon: Vickey Martinez MD;  Location: [facility] OR    COLONOSCOPY N/A 08/14/2015    Procedure: COLONOSCOPY;  Surgeon: Vickey Martinez MD;  Location: [facility] OR    COLONOSCOPY  03/09/2022    [facility] Dr [facility]ahn repeat in 3-5 years    deviated septum repair[      discectomy and fusion[      C3-C6    ENT SURGERY      tube placement in ears[       Current providers sharing in care for this patient include:  Patient Care Team:  Nestor Alba MD as PCP - General (Family Medicine)  Nestor Alba MD as Assigned PCP    The following health maintenance items are reviewed in Epic and correct as of today:  Health Maintenance   Topic Date Due    ADVANCE CARE PLANNING  Never done    HEPATITIS C SCREENING  Never done    MEDICARE ANNUAL WELLNESS VISIT  Never done    INFLUENZA VACCINE (1) 09/01/2024    COVID-19 Vaccine (5 - 2024-25 season) 09/01/2024    RSV VACCINE (1 - 1-dose 75+ series) Never done    LIPID  01/15/2025    COLORECTAL CANCER SCREENING  03/09/2025    BMP  04/26/2025    FALL RISK ASSESSMENT  02/25/2026    GLUCOSE  04/26/2027    DTAP/TDAP/TD IMMUNIZATION (3 - Td or Tdap) 07/18/2029    PHQ-2 (once per calendar year)  Completed    Pneumococcal Vaccine: 50+ Years  Completed    ZOSTER IMMUNIZATION  Completed    AORTIC ANEURYSM SCREENING (SYSTEM ASSIGNED)  Completed    HPV IMMUNIZATION  Aged Out    MENINGITIS IMMUNIZATION  Aged Out    LUNG CANCER SCREENING  Discontinued         Review of Systems  CONSTITUTIONAL: NEGATIVE for fever,  "chills, change in weight  INTEGUMENTARY/SKIN: NEGATIVE for worrisome rashes, moles or lesions  EYES: NEGATIVE for vision changes or irritation  ENT/MOUTH: NEGATIVE for ear, mouth and throat problems  RESP: NEGATIVE for significant cough or SOB  BREAST: NEGATIVE for masses, tenderness or discharge  CV: NEGATIVE for chest pain, palpitations or peripheral edema  GI: NEGATIVE for nausea, abdominal pain, heartburn, or change in bowel habits  : NEGATIVE for frequency, dysuria, or hematuria  MUSCULOSKELETAL: NEGATIVE for significant arthralgias or myalgia  NEURO: NEGATIVE for weakness, dizziness or paresthesias  ENDOCRINE: NEGATIVE for temperature intolerance, skin/hair changes  HEME: NEGATIVE for bleeding problems  PSYCHIATRIC: NEGATIVE for changes in mood or affect     Objective    Exam  /54 (BP Location: Right arm, Patient Position: Sitting)   Pulse 66   Temp 98  F (36.7  C) (Tympanic)   Resp 14   Ht 1.778 m (5' 10\")   Wt 117.4 kg (258 lb 12.8 oz)   SpO2 94%   BMI 37.13 kg/m     Estimated body mass index is 37.13 kg/m  as calculated from the following:    Height as of this encounter: 1.778 m (5' 10\").    Weight as of this encounter: 117.4 kg (258 lb 12.8 oz).    Physical Exam  GENERAL: alert and no distress  EYES: Eyes grossly normal to inspection, PERRL and conjunctivae and sclerae normal  HENT: ear canals and TM's normal, nose and mouth without ulcers or lesions  NECK: no adenopathy, no asymmetry, masses, or scars  RESP: lungs clear to auscultation - no rales, rhonchi or wheezes  CV: irregularly irregular rhythm, normal S1 S2, no S3 or S4, no murmur, click or rub, peripheral pulses strong, and no peripheral edema  ABDOMEN: soft, nontender, no hepatosplenomegaly, no masses and bowel sounds normal  MS: no gross musculoskeletal defects noted, no edema  SKIN: no suspicious lesions or rashes  NEURO: Normal strength and tone, mentation intact and speech normal  PSYCH: mentation appears normal, affect " normal/bright    EKG afib no ischemia.  Labs pending.      The longitudinal plan of care for the diagnosis(es)/condition(s) as documented were addressed during this visit. Due to the added complexity in care, I will continue to support Dakota in the subsequent management and with ongoing continuity of care.        2/25/2025   Mini Cog   Clock Draw Score 2 Normal   3 Item Recall 3 objects recalled   Mini Cog Total Score 5              Signed Electronically by: Nestor Alba MD

## 2025-02-25 NOTE — PROGRESS NOTES
ANTICOAGULATION MANAGEMENT     Dakota Gaston 74 year old male is on warfarin with supratherapeutic INR result. (Goal INR 2.0-3.0)    Recent labs: (last 7 days)     02/25/25  1350   INR 4.13*       ASSESSMENT     Source(s): Chart Review     Warfarin doses taken: Warfarin taken as instructed  Diet: No new diet changes identified  New illness, injury, or hospitalization: Yes: resolving LRTI  Medication/supplement changes:  Finished Levaquin yesterday which may be increasing INR today  Signs or symptoms of bleeding or clotting: No  Previous INR: Therapeutic last 2(+) visits  Additional findings: None       PLAN     Recommended plan for temporary change(s) affecting INR     Dosing Instructions: hold dose then continue your current warfarin dose with next INR in 1 week       Summary  As of 2/25/2025      Full warfarin instructions:  2/25: Hold; Otherwise 2.5 mg every Tue, Wed, Thu; 5 mg all other days   Next INR check:  3/4/2025               Telephone call with Dakota who verbalizes understanding and agrees to plan    Lab visit scheduled    Education provided: Please call back if any changes to your diet, medications or how you've been taking warfarin    Plan made per [facility] anticoagulation protocol    Camilla Montes RN  Anticoagulation Clinic  2/25/2025    _______________________________________________________________________     Anticoagulation Episode Summary       Current INR goal:  2.0-3.0   TTR:  --   Target end date:  Indefinite   Send INR reminders to:  [facility] [place]    Indications    Atrial fibrillation  unspecified type (H) [I48.91]             Comments:  --             Anticoagulation Care Providers       Provider Role Specialty Phone number    Nestor Alba MD Referring Family Medicine [phone]

## 2025-02-26 ENCOUNTER — PATIENT OUTREACH (OUTPATIENT)
Dept: GASTROENTEROLOGY | Facility: CLINIC | Age: 75
End: 2025-02-26
Payer: COMMERCIAL

## 2025-02-27 ENCOUNTER — TELEPHONE (OUTPATIENT)
Dept: FAMILY MEDICINE | Facility: OTHER | Age: 75
End: 2025-02-27

## 2025-02-27 LAB
ATRIAL RATE - MUSE: NORMAL BPM
DIASTOLIC BLOOD PRESSURE - MUSE: NORMAL MMHG
INTERPRETATION ECG - MUSE: NORMAL
P AXIS - MUSE: NORMAL DEGREES
PR INTERVAL - MUSE: NORMAL MS
QRS DURATION - MUSE: 78 MS
QT - MUSE: 360 MS
QTC - MUSE: 394 MS
R AXIS - MUSE: -9 DEGREES
SYSTOLIC BLOOD PRESSURE - MUSE: NORMAL MMHG
T AXIS - MUSE: 8 DEGREES
VENTRICULAR RATE- MUSE: 72 BPM

## 2025-02-27 NOTE — TELEPHONE ENCOUNTER
Screening Questions for the Scheduling of Screening Colonoscopies     (If Colonoscopy is diagnostic, Provider should review the chart before scheduling.)    Are you younger than 50 or older than 80?  No    Do you take aspirin or fish oil?  Yes:  (if yes, tell patient to stop 1 week prior to Colonoscopy)    Do you take warfarin (Coumadin), clopidogrel (Plavix), apixaban (Eliquis), dabigatram (Pradaxa), rivaroxaban (Xarelto) or any blood thinner? No    Do you use oxygen at home?  No    Do you have kidney disease? No    Are you on dialysis? No    Have you had a stroke or heart attack in the last year? No    Have you had a stent in your heart or any blood vessel in the last year? No    Have you had a transplant of any organ?  No    Have you had a colonoscopy or upper endoscopy (EGD) before?  YES. Date-2022.         Date of scheduled Colonoscopy: 4/17/25    Provider: Dr. BRITTANIE Oro     [facility]

## 2025-02-28 DIAGNOSIS — M79.2 NERVE PAIN: Primary | ICD-10-CM

## 2025-02-28 NOTE — TELEPHONE ENCOUNTER
Gabapentin      Last Written Prescription Date:  Historical, 08/22/24 per pharmacy  Last Fill Quantity: unknown,   # refills: unknown  Last Office Visit: 02/25/25  Future Office visit:    Next 5 appointments (look out 90 days)      May 27, 2025 9:45 AM  (Arrive by 9:30 AM)  [facility] [address] [facility] [address]  [phone]             Routing refill request to provider for review/approval because:  Medication is reported/historical

## 2025-02-28 NOTE — TELEPHONE ENCOUNTER
Routing refill request to provider for review/approval because:  Drug not on the [facility], [facility] or [facility] refill protocol or controlled substance

## 2025-03-03 RX ORDER — GABAPENTIN 300 MG/1
300 CAPSULE ORAL 2 TIMES DAILY
Qty: 180 CAPSULE | Refills: 1 | Status: SHIPPED | OUTPATIENT
Start: 2025-03-03

## 2025-03-04 ENCOUNTER — ANTICOAGULATION THERAPY VISIT (OUTPATIENT)
Dept: ANTICOAGULATION | Facility: OTHER | Age: 75
End: 2025-03-04
Payer: COMMERCIAL

## 2025-03-04 ENCOUNTER — LAB (OUTPATIENT)
Dept: LAB | Facility: OTHER | Age: 75
End: 2025-03-04
Payer: MEDICARE

## 2025-03-04 DIAGNOSIS — I48.91 ATRIAL FIBRILLATION, UNSPECIFIED TYPE (H): ICD-10-CM

## 2025-03-04 DIAGNOSIS — Z79.01 LONG TERM CURRENT USE OF ANTICOAGULANT THERAPY: ICD-10-CM

## 2025-03-04 DIAGNOSIS — I48.91 ATRIAL FIBRILLATION, UNSPECIFIED TYPE (H): Primary | ICD-10-CM

## 2025-03-04 LAB — INR BLD: 2.2 (ref 0.9–1.1)

## 2025-03-04 PROCEDURE — 85610 PROTHROMBIN TIME: CPT | Mod: ZL

## 2025-03-04 PROCEDURE — 36416 COLLJ CAPILLARY BLOOD SPEC: CPT | Mod: ZL

## 2025-03-04 NOTE — PROGRESS NOTES
ANTICOAGULATION MANAGEMENT     Dakota Gaston 74 year old male is on warfarin with therapeutic INR result. (Goal INR 2.0-3.0)    Recent labs: (last 7 days)     03/04/25  0928   INR 2.2*       ASSESSMENT     Source(s): Chart Review and Patient/Caregiver Call     Warfarin doses taken: Warfarin taken as instructed  Diet: No new diet changes identified  Medication/supplement changes: None noted  New illness, injury, or hospitalization: No  Signs or symptoms of bleeding or clotting: No  Previous result: Supratherapeutic  Additional findings: None       PLAN     Recommended plan for no diet, medication or health factor changes affecting INR     Dosing Instructions: Continue your current warfarin dose with next INR in 2 weeks       Summary  As of 3/4/2025      Full warfarin instructions:  2.5 mg every Tue, Wed, Thu; 5 mg all other days   Next INR check:  3/18/2025               Telephone call with Dakota who verbalizes understanding and agrees to plan    Lab visit scheduled    Education provided: None required    Plan made per [facility] anticoagulation protocol    Keara Bowie RN  3/4/2025  Anticoagulation Clinic  [place] for routing messages: martín BAUER  [facility] patient phone line: [phone]        _______________________________________________________________________     Anticoagulation Episode Summary       Current INR goal:  2.0-3.0   TTR:  52.4% (5 d)   Target end date:  Indefinite   Send INR reminders to:  JAREK BAUER    Indications    Atrial fibrillation  unspecified type (H) [I48.91]             Comments:  --             Anticoagulation Care Providers       Provider Role Specialty Phone number    Nestor Alba MD Referring Family Medicine [phone]

## 2025-03-17 ENCOUNTER — TELEPHONE (OUTPATIENT)
Dept: NUCLEAR MEDICINE | Facility: HOSPITAL | Age: 75
End: 2025-03-17

## 2025-03-18 ENCOUNTER — ANTICOAGULATION THERAPY VISIT (OUTPATIENT)
Dept: ANTICOAGULATION | Facility: OTHER | Age: 75
End: 2025-03-18
Attending: FAMILY MEDICINE
Payer: MEDICARE

## 2025-03-18 ENCOUNTER — LAB (OUTPATIENT)
Dept: LAB | Facility: OTHER | Age: 75
End: 2025-03-18
Payer: MEDICARE

## 2025-03-18 DIAGNOSIS — I48.91 ATRIAL FIBRILLATION, UNSPECIFIED TYPE (H): ICD-10-CM

## 2025-03-18 DIAGNOSIS — Z79.01 LONG TERM CURRENT USE OF ANTICOAGULANT THERAPY: ICD-10-CM

## 2025-03-18 DIAGNOSIS — I48.91 ATRIAL FIBRILLATION, UNSPECIFIED TYPE (H): Primary | ICD-10-CM

## 2025-03-18 LAB — INR BLD: 3.8 (ref 0.9–1.1)

## 2025-03-18 PROCEDURE — 36416 COLLJ CAPILLARY BLOOD SPEC: CPT | Mod: ZL

## 2025-03-18 PROCEDURE — 85610 PROTHROMBIN TIME: CPT | Mod: ZL

## 2025-03-18 NOTE — PROGRESS NOTES
ANTICOAGULATION MANAGEMENT     Dakota Gaston 74 year old male is on warfarin with supratherapeutic INR result. (Goal INR 2.0-3.0)    Recent labs: (last 7 days)     03/18/25  0918   INR 3.8*       ASSESSMENT     Source(s): Chart Review and Patient/Caregiver Call     Warfarin doses taken: Warfarin taken as instructed  Diet: No new diet changes identified  Medication/supplement changes:  taking increased tylenol d/t pulled muscle in his leg  New illness, injury, or hospitalization: No  Signs or symptoms of bleeding or clotting: No  Previous result: Therapeutic last visit; previously outside of goal range  Additional findings: Upcoming surgery/procedure colonoscopy on 4/17 with preop on 4/3 will determine hold for procedure        PLAN     Recommended plan for temporary change(s) affecting INR     Dosing Instructions: hold dose then continue your current warfarin dose with next INR in 2 weeks       Summary  As of 3/18/2025      Full warfarin instructions:  3/18: Hold; Otherwise 2.5 mg every Tue, Wed, Thu; 5 mg all other days   Next INR check:  4/3/2025               Telephone call with Dakota who verbalizes understanding and agrees to plan    Check at provider office visit    Education provided: None required    Plan made per [facility] anticoagulation protocol    Keara Bowie RN  3/18/2025  Anticoagulation Clinic  [employer] for routing messages: martín BAUER  [facility] patient phone line: [phone]        _______________________________________________________________________     Anticoagulation Episode Summary       Current INR goal:  2.0-3.0   TTR:  50.7% (2.7 wk)   Target end date:  Indefinite   Send INR reminders to:  JAREK BAUER    Indications    Atrial fibrillation  unspecified type (H) [I48.91]             Comments:  --             Anticoagulation Care Providers       Provider Role Specialty Phone number    Nestor Alba MD Referring Family Medicine [phone]

## 2025-03-19 ENCOUNTER — HOSPITAL ENCOUNTER (OUTPATIENT)
Dept: NUCLEAR MEDICINE | Facility: HOSPITAL | Age: 75
Setting detail: NUCLEAR MEDICINE
Discharge: HOME | End: 2025-03-19
Attending: FAMILY MEDICINE
Payer: MEDICARE

## 2025-03-19 ENCOUNTER — HOSPITAL ENCOUNTER (OUTPATIENT)
Dept: CARDIOLOGY | Facility: HOSPITAL | Age: 75
Setting detail: NUCLEAR MEDICINE
Discharge: HOME | End: 2025-03-19
Attending: FAMILY MEDICINE
Payer: MEDICARE

## 2025-03-19 DIAGNOSIS — R06.02 SOB (SHORTNESS OF BREATH) ON EXERTION: ICD-10-CM

## 2025-03-19 DIAGNOSIS — I48.91 ATRIAL FIBRILLATION, UNSPECIFIED TYPE (H): ICD-10-CM

## 2025-03-19 PROCEDURE — 93017 CV STRESS TEST TRACING ONLY: CPT

## 2025-03-19 PROCEDURE — 78452 HT MUSCLE IMAGE SPECT MULT: CPT

## 2025-03-19 PROCEDURE — A9500 TC99M SESTAMIBI: HCPCS | Performed by: RADIOLOGY

## 2025-03-19 PROCEDURE — 343N000001 HC RX 343 MED OP 636: Performed by: RADIOLOGY

## 2025-03-19 PROCEDURE — 343N000001 HC RX 343 MED OP 636: Performed by: STUDENT IN AN ORGANIZED HEALTH CARE EDUCATION/TRAINING PROGRAM

## 2025-03-19 PROCEDURE — A9500 TC99M SESTAMIBI: HCPCS | Performed by: STUDENT IN AN ORGANIZED HEALTH CARE EDUCATION/TRAINING PROGRAM

## 2025-03-19 RX ADMIN — Medication 31.6 MILLICURIE: at 09:00

## 2025-03-19 RX ADMIN — Medication 10.4 MILLICURIE: at 06:50

## 2025-03-20 ENCOUNTER — TELEPHONE (OUTPATIENT)
Dept: FAMILY MEDICINE | Facility: OTHER | Age: 75
End: 2025-03-20

## 2025-03-20 DIAGNOSIS — R94.39 POSITIVE CARDIAC STRESS TEST: ICD-10-CM

## 2025-03-20 DIAGNOSIS — I25.10 CORONARY ARTERY DISEASE INVOLVING NATIVE HEART WITHOUT ANGINA PECTORIS, UNSPECIFIED VESSEL OR LESION TYPE: Primary | ICD-10-CM

## 2025-03-20 LAB
CV STRESS MAX HR HE: 189
RATE PRESSURE PRODUCT: NORMAL
STRESS ECHO BASELINE DIASTOLIC HE: 82
STRESS ECHO BASELINE HR: 95 BPM
STRESS ECHO BASELINE SYSTOLIC BP: 148
STRESS ECHO CALCULATED PERCENT HR: 130 %
STRESS ECHO LAST STRESS DIASTOLIC BP: 82
STRESS ECHO LAST STRESS SYSTOLIC BP: 184
STRESS ECHO POST ESTIMATED WORKLOAD: 4.7 METS
STRESS ECHO POST EXERCISE DUR MIN: 2 MIN
STRESS ECHO POST EXERCISE DUR SEC: 29 SEC
STRESS ECHO TARGET HR: 145

## 2025-03-25 ENCOUNTER — TRANSFERRED RECORDS (OUTPATIENT)
Dept: HEALTH INFORMATION MANAGEMENT | Facility: CLINIC | Age: 75
End: 2025-03-25
Payer: COMMERCIAL

## 2025-04-03 ENCOUNTER — OFFICE VISIT (OUTPATIENT)
Dept: FAMILY MEDICINE | Facility: OTHER | Age: 75
End: 2025-04-03
Attending: FAMILY MEDICINE
Payer: MEDICARE

## 2025-04-03 ENCOUNTER — ANTICOAGULATION THERAPY VISIT (OUTPATIENT)
Dept: ANTICOAGULATION | Facility: OTHER | Age: 75
End: 2025-04-03
Payer: COMMERCIAL

## 2025-04-03 VITALS
DIASTOLIC BLOOD PRESSURE: 72 MMHG | TEMPERATURE: 97.3 F | OXYGEN SATURATION: 94 % | SYSTOLIC BLOOD PRESSURE: 132 MMHG | WEIGHT: 266 LBS | HEART RATE: 71 BPM | BODY MASS INDEX: 38.17 KG/M2

## 2025-04-03 DIAGNOSIS — I48.91 ATRIAL FIBRILLATION, UNSPECIFIED TYPE (H): Primary | ICD-10-CM

## 2025-04-03 DIAGNOSIS — I48.91 ATRIAL FIBRILLATION, UNSPECIFIED TYPE (H): ICD-10-CM

## 2025-04-03 DIAGNOSIS — Z01.818 PREOP EXAMINATION: Primary | ICD-10-CM

## 2025-04-03 DIAGNOSIS — I25.10 CORONARY ARTERY DISEASE INVOLVING NATIVE HEART WITHOUT ANGINA PECTORIS, UNSPECIFIED VESSEL OR LESION TYPE: ICD-10-CM

## 2025-04-03 DIAGNOSIS — Z79.01 LONG TERM CURRENT USE OF ANTICOAGULANT THERAPY: ICD-10-CM

## 2025-04-03 DIAGNOSIS — Z86.0100 HISTORY OF COLON POLYPS: ICD-10-CM

## 2025-04-03 LAB
ANION GAP SERPL CALCULATED.3IONS-SCNC: 9 MMOL/L (ref 7–15)
BASOPHILS # BLD AUTO: 0.1 10E3/UL (ref 0–0.2)
BASOPHILS NFR BLD AUTO: 1 %
BUN SERPL-MCNC: 18.1 MG/DL (ref 8–23)
CALCIUM SERPL-MCNC: 10.1 MG/DL (ref 8.8–10.4)
CHLORIDE SERPL-SCNC: 105 MMOL/L (ref 98–107)
CREAT SERPL-MCNC: 1.26 MG/DL (ref 0.67–1.17)
EGFRCR SERPLBLD CKD-EPI 2021: 59 ML/MIN/1.73M2
EOSINOPHIL # BLD AUTO: 0.3 10E3/UL (ref 0–0.7)
EOSINOPHIL NFR BLD AUTO: 4 %
ERYTHROCYTE [DISTWIDTH] IN BLOOD BY AUTOMATED COUNT: 13 % (ref 10–15)
GLUCOSE SERPL-MCNC: 124 MG/DL (ref 70–99)
HCO3 SERPL-SCNC: 26 MMOL/L (ref 22–29)
HCT VFR BLD AUTO: 41.9 % (ref 40–53)
HGB BLD-MCNC: 13.6 G/DL (ref 13.3–17.7)
IMM GRANULOCYTES # BLD: 0 10E3/UL
IMM GRANULOCYTES NFR BLD: 0 %
INR BLD: 3 (ref 0.9–1.1)
LYMPHOCYTES # BLD AUTO: 1.5 10E3/UL (ref 0.8–5.3)
LYMPHOCYTES NFR BLD AUTO: 18 %
MCH RBC QN AUTO: 30.7 PG (ref 26.5–33)
MCHC RBC AUTO-ENTMCNC: 32.5 G/DL (ref 31.5–36.5)
MCV RBC AUTO: 95 FL (ref 78–100)
MONOCYTES # BLD AUTO: 0.6 10E3/UL (ref 0–1.3)
MONOCYTES NFR BLD AUTO: 8 %
NEUTROPHILS # BLD AUTO: 5.6 10E3/UL (ref 1.6–8.3)
NEUTROPHILS NFR BLD AUTO: 69 %
PLATELET # BLD AUTO: 275 10E3/UL (ref 150–450)
POTASSIUM SERPL-SCNC: 4.7 MMOL/L (ref 3.4–5.3)
RBC # BLD AUTO: 4.43 10E6/UL (ref 4.4–5.9)
SODIUM SERPL-SCNC: 140 MMOL/L (ref 135–145)
WBC # BLD AUTO: 8.1 10E3/UL (ref 4–11)

## 2025-04-03 PROCEDURE — 36415 COLL VENOUS BLD VENIPUNCTURE: CPT | Mod: ZL | Performed by: FAMILY MEDICINE

## 2025-04-03 PROCEDURE — 82565 ASSAY OF CREATININE: CPT | Mod: ZL | Performed by: FAMILY MEDICINE

## 2025-04-03 PROCEDURE — 85610 PROTHROMBIN TIME: CPT | Mod: ZL | Performed by: FAMILY MEDICINE

## 2025-04-03 PROCEDURE — 80048 BASIC METABOLIC PNL TOTAL CA: CPT | Mod: ZL | Performed by: FAMILY MEDICINE

## 2025-04-03 PROCEDURE — G0463 HOSPITAL OUTPT CLINIC VISIT: HCPCS

## 2025-04-03 PROCEDURE — 85014 HEMATOCRIT: CPT | Mod: ZL | Performed by: FAMILY MEDICINE

## 2025-04-03 PROCEDURE — 85004 AUTOMATED DIFF WBC COUNT: CPT | Mod: ZL | Performed by: FAMILY MEDICINE

## 2025-04-03 PROCEDURE — 82310 ASSAY OF CALCIUM: CPT | Mod: ZL | Performed by: FAMILY MEDICINE

## 2025-04-03 RX ORDER — FUROSEMIDE 20 MG/1
1 TABLET ORAL
COMMUNITY
Start: 2025-03-25

## 2025-04-03 ASSESSMENT — PAIN SCALES - GENERAL: PAINLEVEL_OUTOF10: NO PAIN (0)

## 2025-04-03 NOTE — PROGRESS NOTES
ANTICOAGULATION MANAGEMENT     Dakota Gaston 74 year old male is on warfarin with therapeutic INR result. (Goal INR 2.0-3.0)    Recent labs: (last 7 days)     04/03/25  0855   INR 3.0*       ASSESSMENT     Source(s): Chart Review and Patient/Caregiver Call     Warfarin doses taken: Warfarin taken as instructed  Diet: No new diet changes identified  Medication/supplement changes: None noted  New illness, injury, or hospitalization: No  Signs or symptoms of bleeding or clotting: No  Previous result: Supratherapeutic  Additional findings: Upcoming surgery/procedure colonoscopy 4/17 preop today no bridging per Dr Alba       PLAN     Recommended plan for no diet, medication or health factor changes affecting INR     Dosing Instructions: Continue your current warfarin dose with next INR in 3 weeks       Summary  As of 4/3/2025      Full warfarin instructions:  4/14: Hold; 4/15: Hold; 4/16: Hold; Otherwise 2.5 mg every Tue, Wed, Thu; 5 mg all other days   Next INR check:  4/24/2025               Telephone call with Dakota who verbalizes understanding and agrees to plan    Lab visit scheduled    Education provided: None required    Plan made per [facility] anticoagulation protocol    Keara Bowie RN  4/3/2025  Anticoagulation Clinic  [employer] for routing messages: martín BAUER  [facility] patient phone line: [phone]        _______________________________________________________________________     Anticoagulation Episode Summary       Current INR goal:  2.0-3.0   TTR:  27.8% (1.2 mo)   Target end date:  Indefinite   Send INR reminders to:  JAREK BAUER    Indications    Atrial fibrillation  unspecified type (H) [I48.91]             Comments:  --             Anticoagulation Care Providers       Provider Role Specialty Phone number    Nestor Alba MD [facility] Family Medicine [phone]

## 2025-04-03 NOTE — H&P (VIEW-ONLY)
Preoperative Evaluation  [facility]  [address]  [facility] [address]  Phone: [phone]  Primary Provider: Nestor Alba MD  Pre-op Performing Provider: Nestor Alba MD  Apr 3, 2025             4/3/2025   Surgical Information   What procedure is being done? colonocopy   Facility or Hospital where procedure/surgery will be performed: [facility]   Who is doing the procedure / surgery? [facility]   Date of surgery / procedure: 82349   Time of surgery / procedure: tbd   Where do you plan to recover after surgery? at home alone     Fax number for surgical facility: Note does not need to be faxed, will be available electronically in Epic.    Assessment & Plan     The proposed surgical procedure is considered LOW risk.    Preop examination  Low risk procedure.  Stable patient with stable functional capacity.  Questionable stress test in February, saw cardiology at [facility] who reassured with no angiogram needed and stable findings on the stress from 2018 (I believe).  Updated EKG not indicated with recent workups.  I believe he is in afib.  He is on coumadin and can hold for procedure without bridge.    - Basic metabolic panel; Future  - CBC with Platelets & Differential; Future  - EKG 12-lead complete w/read - Clinics  - Basic metabolic panel  - CBC with Platelets & Differential    Atrial fibrillation, unspecified type (H)  As above.  Update INR.    - INR point of care ( [facility] clinic ONLY)    Long term current use of anticoagulant therapy  Update INR>   - INR point of care ( [facility] clinic ONLY)    Coronary artery disease involving native heart without angina pectoris, unspecified vessel or lesion type  See discussion above.  Failed stress with cardiology through [facility] seeing him since and reassuring.  See attached.      History of colon polyps  Upcoming scope.       Possible Sleep Apnea:            - No identified additional risk factors other than previously addressed          Recommendation  Approval given to proceed with proposed procedure, without further diagnostic evaluation.    Jackie Duncan is a 74 year old, presenting for the following:  Pre-Op Exam          4/3/2025     8:49 AM   Additional Questions   Roomed by Vero FARRAR: upcoming colon          4/3/2025   Pre-Op Questionnaire   Do you have a cough, shortness of breath, or wheezing? No   Do you or anyone in your family have previous history of blood clots? No   Do you or does anyone in your family have a serious bleeding problem such as prolonged bleeding following surgeries or cuts? No   Have you ever had problems with anemia or been told to take iron pills? No   Have you had any abnormal blood loss such as black, tarry or bloody stools? No   Have you ever had a blood transfusion? No   Are you willing to have a blood transfusion if it is medically needed before, during, or after your surgery? Yes   Have you or any of your relatives ever had problems with anesthesia? No   Do you have sleep apnea, excessive snoring or daytime drowsiness? (!) YES   Do you have a CPAP machine? (!) NO    Do you have any artifical heart valves or other implanted medical devices like a pacemaker, defibrillator, or continuous glucose monitor? No   Do you have artificial joints? (!) YES   Are you allergic to latex? No     Health Care Directive  Patient does not have a Health Care Directive: Discussed advance care planning with patient; however, patient declined at this time.    Preoperative Review of         Status of Chronic Conditions:  See problem list for active medical problems.  Problems all longstanding and stable, except as noted/documented.  See ROS for pertinent symptoms related to these conditions.    Patient Active Problem List    Diagnosis Date Noted    Coronary artery disease involving native heart without angina pectoris, unspecified vessel or lesion type 02/25/2025     Priority: Medium    Chronic bronchitis, unspecified  chronic bronchitis type (H) 02/25/2025     Priority: Medium    Class 2 severe obesity with body mass index (BMI) of 35 to 39.9 with serious comorbidity (H) 02/25/2025     Priority: Medium    Atrial fibrillation, unspecified type (H) 02/17/2025     Priority: Medium    Adenomatous polyp of colon 01/23/2025     Priority: Medium    Benign essential HTN 09/06/2017     Priority: Medium    Sepsis due to Enterococcus (H) 09/06/2017     Priority: Medium    Urinary tract infection, site not specified 09/03/2017     Priority: Medium    Influenza B 02/15/2015     Priority: Medium     Class: Acute    Subtherapeutic international normalized ratio (INR) 02/14/2015     Priority: Medium     Diagnosis updated by automated process. Provider to review and confirm.      Atrial fibrillation (H) 02/14/2015     Priority: Medium    Atypical chest pain 02/14/2015     Priority: Medium    Renal insufficiency 02/14/2015     Priority: Medium    Elevated brain natriuretic peptide (BNP) level 02/14/2015     Priority: Medium    History of colon polyps 04/05/2013     Priority: Medium      Past Medical History:   Diagnosis Date    Arrhythmia     A. fib.    Atrial fibrillation (H)     Coronary artery disease     Dyspnea on exertion     Gastro-oesophageal reflux disease     Hypertension     Sleep apnea     Spondylosis of cervical joint     Stented coronary artery     Tubular adenoma      Past Surgical History:   Procedure Laterality Date    angioplasty with stenting[      arthroscopy left knee[      meniscus tear.    ARTHROTOMY SHOULDER, ROTATOR CUFF REPAIR, COMBINED      spur removed, right shoulder.    CARDIAC SURGERY      COLONOSCOPY      COLONOSCOPY  04/05/2013    Procedure: COLONOSCOPY;  colonoscopy with Polypectomy;  Surgeon: Vickey Martinez MD;  Location: [facility] OR    COLONOSCOPY N/A 08/14/2015    Procedure: COLONOSCOPY;  Surgeon: Vickey Martinez MD;  Location: [facility] OR    COLONOSCOPY  03/09/2022    [facility] Dr Hahn repeat in 3-5 years     deviated septum repair[      discectomy and fusion[      C3-C6    ENT SURGERY      tube placement in ears[       Current Outpatient Medications   Medication Sig Dispense Refill    amLODIPine (NORVASC) 5 MG tablet Take 5 mg by mouth daily.      aspirin 81 MG EC tablet Take 81 mg by mouth daily.      atorvastatin (LIPITOR) 40 MG tablet Take 40 mg by mouth daily.      furosemide (LASIX) 20 MG tablet Take 1 tablet by mouth daily at 2 pm.      gabapentin (NEURONTIN) 300 MG capsule Take 1 capsule (300 mg) by mouth 2 times daily. 180 capsule 1    levocetirizine (XYZAL) 5 MG tablet Take 1 tablet by mouth daily at 2 pm.      levofloxacin (LEVAQUIN) 500 MG tablet Take 1 tablet (500 mg) by mouth daily. 7 tablet 0    losartan (COZAAR) 100 MG tablet Take 1 tablet by mouth daily at 2 pm.      metFORMIN (GLUCOPHAGE) 500 MG tablet Take 500 mg by mouth daily (with breakfast).      metoprolol succinate ER (TOPROL-XL) 200 MG 24 hr tablet Take 200 mg by mouth daily.      montelukast (SINGULAIR) 10 MG tablet       nitroglycerin (NITROSTAT) 0.4 MG SL tablet Place  under the tongue every 5 minutes as needed.      omeprazole (PRILOSEC) 20 MG DR capsule Take 20 mg by mouth every morning.      VITAMIN D, CHOLECALCIFEROL, PO Take 5,000 Units by mouth daily.      warfarin (COUMADIN) 5 MG tablet Take 5 mg by mouth Sun, Mon, Friday, Sat and 2.5 mg by mouth the rest of the week.         Allergies   Allergen Reactions    Penicillin G         Social History     Tobacco Use    Smoking status: Former     Current packs/day: 0.00     Types: Cigarettes     Quit date: 2003     Years since quittin.0    Smokeless tobacco: Never   Substance Use Topics    Alcohol use: No     Comment: rona     Family History   Problem Relation Age of Onset    Lung Cancer Father     Aneurysm Father     Chronic Obstructive Pulmonary Disease Father     Heart Disease Father     Cancer Father     Hypertension Mother     Cerebrovascular Disease Mother     Mental Illness  "Mother     Lung Cancer Brother         x2    Pancreatic Cancer Brother     Pancreatic Cancer Sister      History   Drug Use No             Review of Systems  CONSTITUTIONAL: NEGATIVE for fever, chills, change in weight  INTEGUMENTARY/SKIN: NEGATIVE for worrisome rashes, moles or lesions  EYES: NEGATIVE for vision changes or irritation  ENT/MOUTH: NEGATIVE for ear, mouth and throat problems  RESP: NEGATIVE for significant cough or SOB  BREAST: NEGATIVE for masses, tenderness or discharge  CV: NEGATIVE for chest pain, palpitations or peripheral edema  GI: NEGATIVE for nausea, abdominal pain, heartburn, or change in bowel habits  : NEGATIVE for frequency, dysuria, or hematuria  MUSCULOSKELETAL: NEGATIVE for significant arthralgias or myalgia  NEURO: NEGATIVE for weakness, dizziness or paresthesias  ENDOCRINE: NEGATIVE for temperature intolerance, skin/hair changes  HEME: NEGATIVE for bleeding problems  PSYCHIATRIC: NEGATIVE for changes in mood or affect    Objective    /72   Pulse 71   Temp 97.3  F (36.3  C) (Tympanic)   Wt 120.7 kg (266 lb)   SpO2 94%   BMI 38.17 kg/m     Estimated body mass index is 38.17 kg/m  as calculated from the following:    Height as of 2/25/25: 1.778 m (5' 10\").    Weight as of this encounter: 120.7 kg (266 lb).  Physical Exam  GENERAL: alert and no distress  EYES: Eyes grossly normal to inspection, PERRL and conjunctivae and sclerae normal  HENT: ear canals and TM's normal, nose and mouth without ulcers or lesions  NECK: no adenopathy, no asymmetry, masses, or scars  RESP: lungs clear to auscultation - no rales, rhonchi or wheezes  CV: regular rate and rhythm, normal S1 S2, no S3 or S4, no murmur, click or rub, no peripheral edema  ABDOMEN: soft, nontender, no hepatosplenomegaly, no masses and bowel sounds normal  MS: no gross musculoskeletal defects noted, no edema  SKIN: no suspicious lesions or rashes  NEURO: Normal strength and tone, mentation intact and speech " normal  PSYCH: mentation appears normal, affect normal/bright    Recent Labs   Lab Test 04/03/25  0855 03/18/25  0918 03/04/25  0928 02/25/25  1350 06/03/24  0954 04/26/24  2051   HGB 13.6  --   --  13.4   < > 13.9     --   --  253   < > 215   INR 3.0* 3.8*   < > 4.13*   < >  --    NA  --   --   --  139  --  137   POTASSIUM  --   --   --  4.7  --  4.5   CR  --   --   --  1.31*  --  1.20*    < > = values in this interval not displayed.        Diagnostics  Cbc stable.  Bmp stable.     No EKG this visit, completed in the last 90 days.  Stress test done and cardiology through [facility] seen since stress as well. See discussion above.      Revised Cardiac Risk Index (RCRI)  The patient has the following serious cardiovascular risks for perioperative complications:   - No serious cardiac risks = 0 points     RCRI Interpretation: 0 points: Class I (very low risk - 0.4% complication rate)     The longitudinal plan of care for the diagnosis(es)/condition(s) as documented were addressed during this visit. Due to the added complexity in care, I will continue to support Dakota in the subsequent management and with ongoing continuity of care.    Signed Electronically by: Nestor Alba MD  A copy of this evaluation report is provided to the requesting physician.

## 2025-04-03 NOTE — PROGRESS NOTES
Preoperative Evaluation  [facility]  [address]  [facility] [address]  Phone: [phone]  Primary Provider: Nestor Alba MD  Pre-op Performing Provider: Nestor Alba MD  Apr 3, 2025             4/3/2025   Surgical Information   What procedure is being done? colonocopy   Facility or Hospital where procedure/surgery will be performed: [facility]   Who is doing the procedure / surgery? [facility]   Date of surgery / procedure: [id number]   Time of surgery / procedure: tbd   Where do you plan to recover after surgery? at home alone     Fax number for surgical facility: Note does not need to be faxed, will be available electronically in Epic.    Assessment & Plan     The proposed surgical procedure is considered LOW risk.    Preop examination  Low risk procedure.  Stable patient with stable functional capacity.  Questionable stress test in February, saw cardiology at [facility] who reassured with no angiogram needed and stable findings on the stress from 2018 (I believe).  Updated EKG not indicated with recent workups.  I believe he is in afib.  He is on coumadin and can hold for procedure without bridge.    - Basic metabolic panel; Future  - CBC with Platelets & Differential; Future  - EKG 12-lead complete w/read - Clinics  - Basic metabolic panel  - CBC with Platelets & Differential    Atrial fibrillation, unspecified type (H)  As above.  Update INR.    - INR point of care ( [facility] clinic ONLY)    Long term current use of anticoagulant therapy  Update INR>   - INR point of care ( [facility] clinic ONLY)    Coronary artery disease involving native heart without angina pectoris, unspecified vessel or lesion type  See discussion above.  Failed stress with cardiology through [facility] seeing him since and reassuring.  See attached.      History of colon polyps  Upcoming scope.       Possible Sleep Apnea:            - No identified additional risk factors other than previously addressed          Recommendation  Approval given to proceed with proposed procedure, without further diagnostic evaluation.    Jackie Duncan is a 74 year old, presenting for the following:  Pre-Op Exam          4/3/2025     8:49 AM   Additional Questions   Roomed by Vero FARRAR: upcoming colon          4/3/2025   Pre-Op Questionnaire   Do you have a cough, shortness of breath, or wheezing? No   Do you or anyone in your family have previous history of blood clots? No   Do you or does anyone in your family have a serious bleeding problem such as prolonged bleeding following surgeries or cuts? No   Have you ever had problems with anemia or been told to take iron pills? No   Have you had any abnormal blood loss such as black, tarry or bloody stools? No   Have you ever had a blood transfusion? No   Are you willing to have a blood transfusion if it is medically needed before, during, or after your surgery? Yes   Have you or any of your relatives ever had problems with anesthesia? No   Do you have sleep apnea, excessive snoring or daytime drowsiness? (!) YES   Do you have a CPAP machine? (!) NO    Do you have any artifical heart valves or other implanted medical devices like a pacemaker, defibrillator, or continuous glucose monitor? No   Do you have artificial joints? (!) YES   Are you allergic to latex? No     Health Care Directive  Patient does not have a Health Care Directive: Discussed advance care planning with patient; however, patient declined at this time.    Preoperative Review of         Status of Chronic Conditions:  See problem list for active medical problems.  Problems all longstanding and stable, except as noted/documented.  See ROS for pertinent symptoms related to these conditions.    Patient Active Problem List    Diagnosis Date Noted    Coronary artery disease involving native heart without angina pectoris, unspecified vessel or lesion type 02/25/2025     Priority: Medium    Chronic bronchitis, unspecified  chronic bronchitis type (H) 02/25/2025     Priority: Medium    Class 2 severe obesity with body mass index (BMI) of 35 to 39.9 with serious comorbidity (H) 02/25/2025     Priority: Medium    Atrial fibrillation, unspecified type (H) 02/17/2025     Priority: Medium    Adenomatous polyp of colon 01/23/2025     Priority: Medium    Benign essential HTN 09/06/2017     Priority: Medium    Sepsis due to Enterococcus (H) 09/06/2017     Priority: Medium    Urinary tract infection, site not specified 09/03/2017     Priority: Medium    Influenza B 02/15/2015     Priority: Medium     Class: Acute    Subtherapeutic international normalized ratio (INR) 02/14/2015     Priority: Medium     Diagnosis updated by automated process. Provider to review and confirm.      Atrial fibrillation (H) 02/14/2015     Priority: Medium    Atypical chest pain 02/14/2015     Priority: Medium    Renal insufficiency 02/14/2015     Priority: Medium    Elevated brain natriuretic peptide (BNP) level 02/14/2015     Priority: Medium    History of colon polyps 04/05/2013     Priority: Medium      Past Medical History:   Diagnosis Date    Arrhythmia     A. fib.    Atrial fibrillation (H)     Coronary artery disease     Dyspnea on exertion     Gastro-oesophageal reflux disease     Hypertension     Sleep apnea     Spondylosis of cervical joint     Stented coronary artery     Tubular adenoma      Past Surgical History:   Procedure Laterality Date    angioplasty with stenting[      arthroscopy left knee[      meniscus tear.    ARTHROTOMY SHOULDER, ROTATOR CUFF REPAIR, COMBINED      spur removed, right shoulder.    CARDIAC SURGERY      COLONOSCOPY      COLONOSCOPY  04/05/2013    Procedure: COLONOSCOPY;  colonoscopy with Polypectomy;  Surgeon: Vickey Martinez MD;  Location: [facility]    COLONOSCOPY N/A 08/14/2015    Procedure: COLONOSCOPY;  Surgeon: Vickey Martinez MD;  Location: [facility]    COLONOSCOPY  03/09/2022    [facility] Dr Hahn repeat in 3-5 years     deviated septum repair[      discectomy and fusion[      C3-C6    ENT SURGERY      tube placement in ears[       Current Outpatient Medications   Medication Sig Dispense Refill    amLODIPine (NORVASC) 5 MG tablet Take 5 mg by mouth daily.      aspirin 81 MG EC tablet Take 81 mg by mouth daily.      atorvastatin (LIPITOR) 40 MG tablet Take 40 mg by mouth daily.      furosemide (LASIX) 20 MG tablet Take 1 tablet by mouth daily at 2 pm.      gabapentin (NEURONTIN) 300 MG capsule Take 1 capsule (300 mg) by mouth 2 times daily. 180 capsule 1    levocetirizine (XYZAL) 5 MG tablet Take 1 tablet by mouth daily at 2 pm.      levofloxacin (LEVAQUIN) 500 MG tablet Take 1 tablet (500 mg) by mouth daily. 7 tablet 0    losartan (COZAAR) 100 MG tablet Take 1 tablet by mouth daily at 2 pm.      metFORMIN (GLUCOPHAGE) 500 MG tablet Take 500 mg by mouth daily (with breakfast).      metoprolol succinate ER (TOPROL-XL) 200 MG 24 hr tablet Take 200 mg by mouth daily.      montelukast (SINGULAIR) 10 MG tablet       nitroglycerin (NITROSTAT) 0.4 MG SL tablet Place  under the tongue every 5 minutes as needed.      omeprazole (PRILOSEC) 20 MG DR capsule Take 20 mg by mouth every morning.      VITAMIN D, CHOLECALCIFEROL, PO Take 5,000 Units by mouth daily.      warfarin (COUMADIN) 5 MG tablet Take 5 mg by mouth Sun, Mon, Friday, Sat and 2.5 mg by mouth the rest of the week.         Allergies   Allergen Reactions    Penicillin G         Social History     Tobacco Use    Smoking status: Former     Current packs/day: 0.00     Types: Cigarettes     Quit date: 2003     Years since quittin.0    Smokeless tobacco: Never   Substance Use Topics    Alcohol use: No     Comment: rona     Family History   Problem Relation Age of Onset    Lung Cancer Father     Aneurysm Father     Chronic Obstructive Pulmonary Disease Father     Heart Disease Father     Cancer Father     Hypertension Mother     Cerebrovascular Disease Mother     Mental Illness  "Mother     Lung Cancer Brother         x2    Pancreatic Cancer Brother     Pancreatic Cancer Sister      History   Drug Use No             Review of Systems  CONSTITUTIONAL: NEGATIVE for fever, chills, change in weight  INTEGUMENTARY/SKIN: NEGATIVE for worrisome rashes, moles or lesions  EYES: NEGATIVE for vision changes or irritation  ENT/MOUTH: NEGATIVE for ear, mouth and throat problems  RESP: NEGATIVE for significant cough or SOB  BREAST: NEGATIVE for masses, tenderness or discharge  CV: NEGATIVE for chest pain, palpitations or peripheral edema  GI: NEGATIVE for nausea, abdominal pain, heartburn, or change in bowel habits  : NEGATIVE for frequency, dysuria, or hematuria  MUSCULOSKELETAL: NEGATIVE for significant arthralgias or myalgia  NEURO: NEGATIVE for weakness, dizziness or paresthesias  ENDOCRINE: NEGATIVE for temperature intolerance, skin/hair changes  HEME: NEGATIVE for bleeding problems  PSYCHIATRIC: NEGATIVE for changes in mood or affect    Objective    /72   Pulse 71   Temp 97.3  F (36.3  C) (Tympanic)   Wt 120.7 kg (266 lb)   SpO2 94%   BMI 38.17 kg/m     Estimated body mass index is 38.17 kg/m  as calculated from the following:    Height as of 2/25/25: 1.778 m (5' 10\").    Weight as of this encounter: 120.7 kg (266 lb).  Physical Exam  GENERAL: alert and no distress  EYES: Eyes grossly normal to inspection, PERRL and conjunctivae and sclerae normal  HENT: ear canals and TM's normal, nose and mouth without ulcers or lesions  NECK: no adenopathy, no asymmetry, masses, or scars  RESP: lungs clear to auscultation - no rales, rhonchi or wheezes  CV: regular rate and rhythm, normal S1 S2, no S3 or S4, no murmur, click or rub, no peripheral edema  ABDOMEN: soft, nontender, no hepatosplenomegaly, no masses and bowel sounds normal  MS: no gross musculoskeletal defects noted, no edema  SKIN: no suspicious lesions or rashes  NEURO: Normal strength and tone, mentation intact and speech " normal  PSYCH: mentation appears normal, affect normal/bright    Recent Labs   Lab Test 04/03/25  0855 03/18/25  0918 03/04/25  0928 02/25/25  1350 06/03/24  0954 04/26/24  2051   HGB 13.6  --   --  13.4   < > 13.9     --   --  253   < > 215   INR 3.0* 3.8*   < > 4.13*   < >  --    NA  --   --   --  139  --  137   POTASSIUM  --   --   --  4.7  --  4.5   CR  --   --   --  1.31*  --  1.20*    < > = values in this interval not displayed.        Diagnostics  Cbc stable.  Bmp stable.     No EKG this visit, completed in the last 90 days.  Stress test done and cardiology through [facility] seen since stress as well. See discussion above.      Revised Cardiac Risk Index (RCRI)  The patient has the following serious cardiovascular risks for perioperative complications:   - No serious cardiac risks = 0 points     RCRI Interpretation: 0 points: Class I (very low risk - 0.4% complication rate)     The longitudinal plan of care for the diagnosis(es)/condition(s) as documented were addressed during this visit. Due to the added complexity in care, I will continue to support Dakota in the subsequent management and with ongoing continuity of care.    Signed Electronically by: Nestor Alba MD  A copy of this evaluation report is provided to the requesting physician.

## 2025-04-14 ENCOUNTER — ANESTHESIA EVENT (OUTPATIENT)
Dept: SURGERY | Facility: HOSPITAL | Age: 75
End: 2025-04-14
Payer: MEDICARE

## 2025-04-14 ASSESSMENT — LIFESTYLE VARIABLES: TOBACCO_USE: 1

## 2025-04-14 ASSESSMENT — ENCOUNTER SYMPTOMS: DYSRHYTHMIAS: 1

## 2025-04-14 NOTE — ANESTHESIA PREPROCEDURE EVALUATION
Anesthesia Pre-Procedure Evaluation    Patient: Dakota Gaston   MRN: [id number] : 1950        Procedure : Procedure(s):  COLONOSCOPY          Past Medical History:   Diagnosis Date     Arrhythmia     A. fib.     Atrial fibrillation (H)      Coronary artery disease      Dyspnea on exertion      Gastro-oesophageal reflux disease      Hypertension      Sleep apnea      Spondylosis of cervical joint      Stented coronary artery      Tubular adenoma       Past Surgical History:   Procedure Laterality Date     angioplasty with stenting[       arthroscopy left knee[      meniscus tear.     ARTHROTOMY SHOULDER, ROTATOR CUFF REPAIR, COMBINED      spur removed, right shoulder.     CARDIAC SURGERY       COLONOSCOPY       COLONOSCOPY  2013    Procedure: COLONOSCOPY;  colonoscopy with Polypectomy;  Surgeon: Vickey Martinez MD;  Location: [facility]     COLONOSCOPY N/A 2015    Procedure: COLONOSCOPY;  Surgeon: Vickey Martinez MD;  Location: [facility]     COLONOSCOPY  2022     [facility] repeat in 3-5 years     deviated septum repair[       discectomy and fusion[      C3-C6     ENT SURGERY       tube placement in ears[        Allergies   Allergen Reactions     Penicillin G       Social History     Tobacco Use     Smoking status: Former     Current packs/day: 0.00     Types: Cigarettes     Quit date: 2003     Years since quittin.0     Smokeless tobacco: Never   Substance Use Topics     Alcohol use: No     Comment: rarley      Wt Readings from Last 1 Encounters:   25 120.7 kg (266 lb)        Anesthesia Evaluation   Pt has had prior anesthetic. Type: MAC.    No history of anesthetic complications       ROS/MED HX  ENT/Pulmonary: Comment: Chronic bronchitis, unspecified chronic bronchitis type (H)    (+) sleep apnea, doesn't use CPAP,         allergic rhinitis,     tobacco use, Past use,                       Neurologic:  - neg neurologic ROS     Cardiovascular: Comment: 3/25/25  cardiology visit: addressed abnormal stress test from 3/19/25 - no further testing recommended (similar to findings in 2021 stress test)    Per 4/3/25 HP Note: Stable patient with stable functional capacity.  Questionable stress test in February, saw cardiology at [facility] who reassured with no angiogram needed and stable findings on the stress from 2018 (I believe).  Updated EKG not indicated with recent workups.  I believe he is in afib.  He is on coumadin and can hold for procedure without bridge.      (+) Dyslipidemia hypertension-range: amlodipine, lasix, losartan (132/72 on 4/3/25)/ -  CAD -  - stent-2003, replaced in 2018. 1  Taking blood thinners  Instructions Given to patient: asa 81 mg, warfarin  - no bridging (holding 4/14, 4/15, 4/16).      FONSECA.             dysrhythmias, a-fib,        Previous cardiac testing   Echo: Date: 2021 Results:  LVEF > 55%, mild mitral and tricuspid regurgitation  Stress Test:  Date: 3/19/25 Results:     The nuclear stress test is abnormal.      There is a small area of ischemia in the distal inferior, apical and   inferolateral segment(s) of the left ventricle.      The patient's exercise capacity is moderately impaired.     ECG Reviewed:  Date: 3/25/25 Results:  HR 79, a-fib, low QRS voltage in precordial leads  Cath:  Date: 2003 Results:  Stent placed    METS/Exercise Tolerance: 4 - Raking leaves, gardening    Hematologic:  - neg hematologic  ROS     Musculoskeletal: Comment: Spondylosis of cervical joint  (+)  arthritis,             GI/Hepatic:     (+) GERD (omeprazole),       bowel prep,            Renal/Genitourinary: Comment: Creatinine 1.26 on 4/3/25 (had been higher throughout the few years before) - no dx of CKD in chart (per chart review 4/14/25)    (+) renal disease,             Endo:     (+)               Obesity,       Psychiatric/Substance Use:  - neg psychiatric ROS     Infectious Disease:  - neg infectious disease ROS     Malignancy:  - neg malignancy ROS    "  Other:  - neg other ROS          Physical Exam    Airway        Mallampati: I   TM distance: > 3 FB   Neck ROM: full   Mouth opening: > 3 cm    Respiratory Devices and Support         Dental       (+) Completely normal teeth      Cardiovascular       Comment: Atrial fibrillation    Rhythm and rate: irregular and normal     Pulmonary           breath sounds clear to auscultation       OUTSIDE LABS:  CBC:   Lab Results   Component Value Date    WBC 8.1 04/03/2025    WBC 7.9 02/25/2025    HGB 13.6 04/03/2025    HGB 13.4 02/25/2025    HCT 41.9 04/03/2025    HCT 39.2 (L) 02/25/2025     04/03/2025     02/25/2025     BMP:   Lab Results   Component Value Date     04/03/2025     02/25/2025    POTASSIUM 4.7 04/03/2025    POTASSIUM 4.7 02/25/2025    CHLORIDE 105 04/03/2025    CHLORIDE 104 02/25/2025    CO2 26 04/03/2025    CO2 23 02/25/2025    BUN 18.1 04/03/2025    BUN 15.8 02/25/2025    CR 1.26 (H) 04/03/2025    CR 1.31 (H) 02/25/2025     (H) 04/03/2025     (H) 02/25/2025     COAGS:   Lab Results   Component Value Date    INR 3.0 (H) 04/03/2025     POC: No results found for: \"BGM\", \"HCG\", \"HCGS\"  HEPATIC:   Lab Results   Component Value Date    ALBUMIN 4.0 02/25/2025    PROTTOTAL 6.8 02/25/2025    ALT 29 02/25/2025    AST 42 02/25/2025    ALKPHOS 34 (L) 02/25/2025    BILITOTAL 0.7 02/25/2025     OTHER:   Lab Results   Component Value Date    LACT 1.3 09/05/2017    ANANYA 10.1 04/03/2025    MAG 2.3 02/14/2015    LIPASE 353 02/14/2015    .0 (H) 09/06/2017    SED 15 02/14/2015       Anesthesia Plan    ASA Status:  3       Anesthesia Type: MAC.     - Reason for MAC: straight local not clinically adequate   Induction: Propofol.   Maintenance: TIVA.        Consents    Anesthesia Plan(s) and associated risks, benefits, and realistic alternatives discussed. Questions answered and patient/representative(s) expressed understanding.     - Discussed: Risks, Benefits and Alternatives for BOTH " SEDATION and the PROCEDURE were discussed     - Discussed with:  Patient      - Extended Intubation/Ventilatory Support Discussed: Yes.      - Patient is DNR/DNI Status: No     Use of blood products discussed: Yes.     - Discussed with: Patient.     Postoperative Care            Comments:    Other Comments: Reviewed 4/3 Parth shelton    Same day INR = 1.21           KHARI Garcia CNP    Clinically Significant Risk Factors Present on Admission                   # Hypertension: Noted on problem list

## 2025-04-17 ENCOUNTER — LAB (OUTPATIENT)
Dept: LAB | Facility: HOSPITAL | Age: 75
End: 2025-04-17
Attending: SURGERY
Payer: MEDICARE

## 2025-04-17 ENCOUNTER — HOSPITAL ENCOUNTER (OUTPATIENT)
Facility: HOSPITAL | Age: 75
Discharge: HOME | End: 2025-04-17
Attending: SURGERY | Admitting: SURGERY
Payer: MEDICARE

## 2025-04-17 ENCOUNTER — ANESTHESIA (OUTPATIENT)
Dept: SURGERY | Facility: HOSPITAL | Age: 75
End: 2025-04-17
Payer: MEDICARE

## 2025-04-17 VITALS
WEIGHT: 256.8 LBS | DIASTOLIC BLOOD PRESSURE: 72 MMHG | SYSTOLIC BLOOD PRESSURE: 98 MMHG | TEMPERATURE: 97 F | RESPIRATION RATE: 16 BRPM | BODY MASS INDEX: 36.76 KG/M2 | HEIGHT: 70 IN | OXYGEN SATURATION: 94 % | HEART RATE: 93 BPM

## 2025-04-17 LAB — INR PPP: 1.21 (ref 0.85–1.15)

## 2025-04-17 PROCEDURE — 370N000017 HC ANESTHESIA TECHNICAL FEE, PER MIN: Performed by: SURGERY

## 2025-04-17 PROCEDURE — 999N000141 HC STATISTIC PRE-PROCEDURE NURSING ASSESSMENT: Performed by: SURGERY

## 2025-04-17 PROCEDURE — 85610 PROTHROMBIN TIME: CPT | Performed by: NURSE PRACTITIONER

## 2025-04-17 PROCEDURE — 36415 COLL VENOUS BLD VENIPUNCTURE: CPT | Performed by: NURSE PRACTITIONER

## 2025-04-17 PROCEDURE — G0105 COLORECTAL SCRN; HI RISK IND: HCPCS | Performed by: SURGERY

## 2025-04-17 PROCEDURE — 250N000011 HC RX IP 250 OP 636: Performed by: NURSE ANESTHETIST, CERTIFIED REGISTERED

## 2025-04-17 PROCEDURE — 710N000012 HC RECOVERY PHASE 2, PER MINUTE: Performed by: SURGERY

## 2025-04-17 PROCEDURE — 250N000009 HC RX 250: Performed by: NURSE ANESTHETIST, CERTIFIED REGISTERED

## 2025-04-17 PROCEDURE — 258N000003 HC RX IP 258 OP 636: Performed by: NURSE ANESTHETIST, CERTIFIED REGISTERED

## 2025-04-17 PROCEDURE — 360N000075 HC SURGERY LEVEL 2, PER MIN: Performed by: SURGERY

## 2025-04-17 PROCEDURE — 258N000003 HC RX IP 258 OP 636: Performed by: NURSE PRACTITIONER

## 2025-04-17 PROCEDURE — 272N000001 HC OR GENERAL SUPPLY STERILE: Performed by: SURGERY

## 2025-04-17 RX ORDER — NALOXONE HYDROCHLORIDE 0.4 MG/ML
0.1 INJECTION, SOLUTION INTRAMUSCULAR; INTRAVENOUS; SUBCUTANEOUS
Status: DISCONTINUED | OUTPATIENT
Start: 2025-04-17 | End: 2025-04-17 | Stop reason: HOSPADM

## 2025-04-17 RX ORDER — ONDANSETRON 4 MG/1
4 TABLET, ORALLY DISINTEGRATING ORAL EVERY 30 MIN PRN
Status: DISCONTINUED | OUTPATIENT
Start: 2025-04-17 | End: 2025-04-17 | Stop reason: HOSPADM

## 2025-04-17 RX ORDER — LIDOCAINE 40 MG/G
CREAM TOPICAL
Status: DISCONTINUED | OUTPATIENT
Start: 2025-04-17 | End: 2025-04-17 | Stop reason: HOSPADM

## 2025-04-17 RX ORDER — OXYCODONE HYDROCHLORIDE 5 MG/1
5 TABLET ORAL
Status: DISCONTINUED | OUTPATIENT
Start: 2025-04-17 | End: 2025-04-17 | Stop reason: HOSPADM

## 2025-04-17 RX ORDER — LIDOCAINE HYDROCHLORIDE 20 MG/ML
INJECTION, SOLUTION INFILTRATION; PERINEURAL PRN
Status: DISCONTINUED | OUTPATIENT
Start: 2025-04-17 | End: 2025-04-17

## 2025-04-17 RX ORDER — DEXAMETHASONE SODIUM PHOSPHATE 10 MG/ML
4 INJECTION, SOLUTION INTRAMUSCULAR; INTRAVENOUS
Status: DISCONTINUED | OUTPATIENT
Start: 2025-04-17 | End: 2025-04-17 | Stop reason: HOSPADM

## 2025-04-17 RX ORDER — METOPROLOL TARTRATE 1 MG/ML
5 INJECTION, SOLUTION INTRAVENOUS ONCE
Status: COMPLETED | OUTPATIENT
Start: 2025-04-17 | End: 2025-04-17

## 2025-04-17 RX ORDER — SODIUM CHLORIDE, SODIUM LACTATE, POTASSIUM CHLORIDE, CALCIUM CHLORIDE 600; 310; 30; 20 MG/100ML; MG/100ML; MG/100ML; MG/100ML
INJECTION, SOLUTION INTRAVENOUS CONTINUOUS
Status: DISCONTINUED | OUTPATIENT
Start: 2025-04-17 | End: 2025-04-17 | Stop reason: HOSPADM

## 2025-04-17 RX ORDER — PROPOFOL 10 MG/ML
INJECTION, EMULSION INTRAVENOUS PRN
Status: DISCONTINUED | OUTPATIENT
Start: 2025-04-17 | End: 2025-04-17

## 2025-04-17 RX ORDER — ONDANSETRON 2 MG/ML
4 INJECTION INTRAMUSCULAR; INTRAVENOUS EVERY 30 MIN PRN
Status: DISCONTINUED | OUTPATIENT
Start: 2025-04-17 | End: 2025-04-17 | Stop reason: HOSPADM

## 2025-04-17 RX ADMIN — PROPOFOL 50 MG: 10 INJECTION, EMULSION INTRAVENOUS at 14:37

## 2025-04-17 RX ADMIN — LIDOCAINE HYDROCHLORIDE 100 MG: 20 INJECTION, SOLUTION INFILTRATION; PERINEURAL at 14:37

## 2025-04-17 RX ADMIN — DEXMEDETOMIDINE HYDROCHLORIDE 5 MCG: 100 INJECTION, SOLUTION INTRAVENOUS at 14:45

## 2025-04-17 RX ADMIN — PROPOFOL 50 MG: 10 INJECTION, EMULSION INTRAVENOUS at 14:43

## 2025-04-17 RX ADMIN — DEXMEDETOMIDINE HYDROCHLORIDE 10 MCG: 100 INJECTION, SOLUTION INTRAVENOUS at 14:42

## 2025-04-17 RX ADMIN — SODIUM CHLORIDE, SODIUM LACTATE, POTASSIUM CHLORIDE, AND CALCIUM CHLORIDE: .6; .31; .03; .02 INJECTION, SOLUTION INTRAVENOUS at 13:07

## 2025-04-17 RX ADMIN — PROPOFOL 50 MG: 10 INJECTION, EMULSION INTRAVENOUS at 14:39

## 2025-04-17 RX ADMIN — METOPROLOL TARTRATE 5 MG: 1 INJECTION, SOLUTION INTRAVENOUS at 13:11

## 2025-04-17 ASSESSMENT — ACTIVITIES OF DAILY LIVING (ADL)
ADLS_ACUITY_SCORE: 41

## 2025-04-17 NOTE — PLAN OF CARE
Patient and responsible adult given discharge instructions with no questions regarding instructions. Natalia score 19. Pain level 0/10.  Discharged from unit via ambulating. Patient discharged to home .

## 2025-04-17 NOTE — ANESTHESIA CARE TRANSFER NOTE
Patient: Dakota Gaston    Procedure: Procedure(s):  COLONOSCOPY       Diagnosis: Encounter for screening colonoscopy [Z12.11]  Diagnosis Additional Information: No value filed.    Anesthesia Type:   MAC     Note:    Oropharynx: oropharynx clear of all foreign objects and spontaneously breathing  Level of Consciousness: drowsy  Oxygen Supplementation: room air    Independent Airway: airway patency satisfactory and stable  Dentition: dentition unchanged  Vital Signs Stable: post-procedure vital signs reviewed and stable  Report to RN Given: handoff report given  Patient transferred to: Phase II    Handoff Report: Identifed the Patient, Identified the Reponsible Provider, Reviewed the pertinent medical history, Discussed the surgical course, Reviewed Intra-OP anesthesia mangement and issues during anesthesia, Set expectations for post-procedure period and Allowed opportunity for questions and acknowledgement of understanding      Vitals:  Vitals Value Taken Time   BP     Temp     Pulse     Resp     SpO2         Electronically Signed By: KHARI Palacio CRNA  April 17, 2025  2:48 PM

## 2025-04-17 NOTE — OP NOTE
REPORT OF OPERATION  DATE OF PROCEDURE: 4/17/2025    PATIENT: Dakota Gaston    SURGERY PERFORMED: Colonoscopy    PREOPERATIVE DIAGNOSIS: Screening colonoscopy and History of Colon Polyps    POSTOPERATIVE DIAGNOSIS:    Same   Normal colonoscopy   Diverticulosis was not identified.   Hemorrhoids  were  identified.    SURGEON: Orlando Oro MD    ASSISTANTS: None    ANESTHESIA: Monitored Anesthesia Care    COMPLICATIONS: None apparent    TRANSFUSIONS: None    TISSUE TO PATHOLOGY: None    FINDINGS: Normal colonoscopy.  Diverticulosis was not identified.  Hemorrhoids  were  identified.    INDICATIONS: This is a 74 year old male in need of a colonoscopy for Screening colonoscopy and History of Colon Polyps.  The patient will be taken to the endoscopy suite for that procedure.    DESCRIPTIONS OF PROCEDURE IN DETAIL: After consent was obtained the patient was taken to the endoscopy suite and placed in the left lateral decubitus position.  The patient was identified and the correct patient was confirmed.  Monitored Anesthesia Care was given.  A time out was performed verifying the correct patient and the correct procedure.  The entire operative team was in agreement.  All necessary equipment and supplies were in the room.    Rectal exam was performed and no lesions of the anal canal were noted.  The colonoscope was inserted into the anus and passed without difficulty to the cecum.  The cecum was identified by the ileocecal valve, the coalescence of the tinea and the appendiceal orifice.  Upon withdrawal all walls of the colon were visualized.  There were no polyps, masses or evidence of colitis seen.  Diverticulosis was not seen.  Upon reaching the rectum the scope was retroflexed and internal hemorrhoids  were  seen.  The scope was straightened back out and removed from the patient.  The patient was then taken to the recovery room in stable condition tolerating the procedure well.      Prep: poor    Withdrawal time  was 5 minutes.    It is recommended that the patient have another colonoscopy PRN years.

## 2025-04-17 NOTE — ANESTHESIA POSTPROCEDURE EVALUATION
Patient: Dakota Gaston    Procedure: Procedure(s):  COLONOSCOPY       Anesthesia Type:  MAC    Note:  Disposition: Outpatient   Postop Pain Control: Uneventful            Sign Out: Well controlled pain   PONV: No   Neuro/Psych: Uneventful            Sign Out: Acceptable/Baseline neuro status   Airway/Respiratory: Uneventful            Sign Out: Acceptable/Baseline resp. status   CV/Hemodynamics: Uneventful            Sign Out: Acceptable CV status; No obvious hypovolemia; No obvious fluid overload   Other NRE: NONE   DID A NON-ROUTINE EVENT OCCUR?        Last vitals:  Vitals Value Taken Time   BP 98/72 04/17/25 1450   Temp 97  F (36.1  C) 04/17/25 1450   Pulse 93 04/17/25 1450   Resp 16 04/17/25 1450   SpO2 95 % 04/17/25 1520   Vitals shown include unfiled device data.    Electronically Signed By: KHARI Johnson CRNA  April 17, 2025  3:24 PM

## 2025-04-17 NOTE — INTERVAL H&P NOTE
"I have reviewed the surgical (or preoperative) H&P that is linked to this encounter, and examined the patient. There are no significant changes    Clinical Conditions Present on Arrival:  Clinically Significant Risk Factors Present on Admission                 # Drug Induced Coagulation Defect: home medication list includes an anticoagulant medication  # Drug Induced Platelet Defect: home medication list includes an antiplatelet medication      # Obesity: Estimated body mass index is 36.85 kg/m  as calculated from the following:    Height as of this encounter: 1.778 m (5' 10\").    Weight as of this encounter: 116.5 kg (256 lb 12.8 oz).       "

## 2025-04-24 ENCOUNTER — ANTICOAGULATION THERAPY VISIT (OUTPATIENT)
Dept: ANTICOAGULATION | Facility: OTHER | Age: 75
End: 2025-04-24
Attending: FAMILY MEDICINE
Payer: COMMERCIAL

## 2025-04-24 ENCOUNTER — LAB (OUTPATIENT)
Dept: LAB | Facility: OTHER | Age: 75
End: 2025-04-24
Attending: FAMILY MEDICINE
Payer: MEDICARE

## 2025-04-24 DIAGNOSIS — I48.91 ATRIAL FIBRILLATION, UNSPECIFIED TYPE (H): Primary | ICD-10-CM

## 2025-04-24 DIAGNOSIS — I48.91 ATRIAL FIBRILLATION, UNSPECIFIED TYPE (H): ICD-10-CM

## 2025-04-24 DIAGNOSIS — Z79.01 LONG TERM CURRENT USE OF ANTICOAGULANT THERAPY: ICD-10-CM

## 2025-04-24 LAB — INR BLD: 1.7 (ref 0.9–1.1)

## 2025-04-24 PROCEDURE — 85610 PROTHROMBIN TIME: CPT | Mod: ZL

## 2025-04-24 PROCEDURE — 36416 COLLJ CAPILLARY BLOOD SPEC: CPT | Mod: ZL

## 2025-04-24 NOTE — PROGRESS NOTES
ANTICOAGULATION MANAGEMENT     Dakota Gaston 74 year old male is on warfarin with subtherapeutic INR result. (Goal INR 2.0-3.0)    Recent labs: (last 7 days)     04/24/25  0855   INR 1.7*       ASSESSMENT     Source(s): Chart Review and Patient/Caregiver Call     Warfarin doses taken: Held for procedure  recently which may be affecting INR  Diet: No new diet changes identified  Medication/supplement changes: None noted  New illness, injury, or hospitalization: No  Signs or symptoms of bleeding or clotting: No  Previous result: Therapeutic last visit; previously outside of goal range  Additional findings: None       PLAN     Recommended plan for temporary change(s) affecting INR     Dosing Instructions: booster dose then continue your current warfarin dose with next INR in 2 weeks       Summary  As of 4/24/2025      Full warfarin instructions:  4/24: 5 mg; Otherwise 2.5 mg every Tue, Wed, Thu; 5 mg all other days   Next INR check:  5/8/2025               Telephone call with Dakota who verbalizes understanding and agrees to plan and who agrees to plan and repeated back plan correctly    Lab visit scheduled    Education provided: Please call back if any changes to your diet, medications or how you've been taking warfarin    Plan made per [facility] anticoagulation protocol    Adolfo Honeycutt, RN  4/24/2025  Anticoagulation Clinic  [employer] for routing messages: p JAREK BAUER          _______________________________________________________________________     Anticoagulation Episode Summary       Current INR goal:  2.0-3.0   TTR:  31.4% (1.9 mo)   Target end date:  Indefinite   Send INR reminders to:  JAREK BAUER    Indications    Atrial fibrillation  unspecified type (H) [I48.91]             Comments:  --             Anticoagulation Care Providers       Provider Role Specialty Phone number    Nestor Alba MD Referring Family Medicine [phone]

## 2025-05-08 ENCOUNTER — ANTICOAGULATION THERAPY VISIT (OUTPATIENT)
Dept: ANTICOAGULATION | Facility: OTHER | Age: 75
End: 2025-05-08
Attending: FAMILY MEDICINE
Payer: COMMERCIAL

## 2025-05-08 ENCOUNTER — LAB (OUTPATIENT)
Dept: LAB | Facility: OTHER | Age: 75
End: 2025-05-08
Attending: FAMILY MEDICINE
Payer: MEDICARE

## 2025-05-08 DIAGNOSIS — I48.91 ATRIAL FIBRILLATION, UNSPECIFIED TYPE (H): Primary | ICD-10-CM

## 2025-05-08 DIAGNOSIS — Z79.01 LONG TERM CURRENT USE OF ANTICOAGULANT THERAPY: ICD-10-CM

## 2025-05-08 DIAGNOSIS — I48.91 ATRIAL FIBRILLATION, UNSPECIFIED TYPE (H): ICD-10-CM

## 2025-05-08 LAB — INR BLD: 2.9 (ref 0.9–1.1)

## 2025-05-08 PROCEDURE — 85610 PROTHROMBIN TIME: CPT | Mod: ZL

## 2025-05-08 PROCEDURE — 36416 COLLJ CAPILLARY BLOOD SPEC: CPT | Mod: ZL

## 2025-05-08 NOTE — PROGRESS NOTES
ANTICOAGULATION MANAGEMENT     Dakota Gaston 74 year old male is on warfarin with therapeutic INR result. (Goal INR 2.0-3.0)    Recent labs: (last 7 days)     05/08/25  1053   INR 2.9*       ASSESSMENT     Source(s): Chart Review and Patient/Caregiver Call     Warfarin doses taken: Warfarin taken as instructed  Diet: No new diet changes identified  Medication/supplement changes: None noted  New illness, injury, or hospitalization: No  Signs or symptoms of bleeding or clotting: No  Previous result: Subtherapeutic  Additional findings: None       PLAN     Recommended plan for no diet, medication or health factor changes affecting INR     Dosing Instructions: Continue your current warfarin dose with next INR in 3 weeks       Summary  As of 5/8/2025      Full warfarin instructions:  2.5 mg every Tue, Wed, Thu; 5 mg all other days   Next INR check:  5/29/2025               Telephone call with Dakota who verbalizes understanding and agrees to plan and who agrees to plan and repeated back plan correctly    Lab visit scheduled    Education provided: Please call back if any changes to your diet, medications or how you've been taking warfarin    Plan made per [facility] anticoagulation protocol    Adolfo Honeycutt RN  5/8/2025  Anticoagulation Clinic  [employer] for routing messages: p JAREK BAUER          _______________________________________________________________________     Anticoagulation Episode Summary       Current INR goal:  2.0-3.0   TTR:  40.2% (2.3 mo)   Target end date:  Indefinite   Send INR reminders to:  JAREK BAUER    Indications    Atrial fibrillation  unspecified type (H) [I48.91]             Comments:  --             Anticoagulation Care Providers       Provider Role Specialty Phone number    Nestor Alba MD Referring Family Medicine [phone]

## 2025-05-27 ENCOUNTER — OFFICE VISIT (OUTPATIENT)
Dept: FAMILY MEDICINE | Facility: OTHER | Age: 75
End: 2025-05-27
Attending: FAMILY MEDICINE
Payer: MEDICARE

## 2025-05-27 ENCOUNTER — RESULTS FOLLOW-UP (OUTPATIENT)
Dept: FAMILY MEDICINE | Facility: OTHER | Age: 75
End: 2025-05-27

## 2025-05-27 ENCOUNTER — ANTICOAGULATION THERAPY VISIT (OUTPATIENT)
Dept: ANTICOAGULATION | Facility: OTHER | Age: 75
End: 2025-05-27
Attending: FAMILY MEDICINE
Payer: COMMERCIAL

## 2025-05-27 VITALS
WEIGHT: 265 LBS | OXYGEN SATURATION: 97 % | SYSTOLIC BLOOD PRESSURE: 126 MMHG | BODY MASS INDEX: 38.02 KG/M2 | HEART RATE: 74 BPM | TEMPERATURE: 97.5 F | DIASTOLIC BLOOD PRESSURE: 78 MMHG

## 2025-05-27 DIAGNOSIS — E66.812 CLASS 2 SEVERE OBESITY WITH BODY MASS INDEX (BMI) OF 35 TO 39.9 WITH SERIOUS COMORBIDITY (H): ICD-10-CM

## 2025-05-27 DIAGNOSIS — Z79.01 LONG TERM CURRENT USE OF ANTICOAGULANT THERAPY: ICD-10-CM

## 2025-05-27 DIAGNOSIS — I48.91 ATRIAL FIBRILLATION, UNSPECIFIED TYPE (H): ICD-10-CM

## 2025-05-27 DIAGNOSIS — N28.9 RENAL INSUFFICIENCY: ICD-10-CM

## 2025-05-27 DIAGNOSIS — I10 BENIGN ESSENTIAL HTN: Primary | ICD-10-CM

## 2025-05-27 DIAGNOSIS — E66.01 CLASS 2 SEVERE OBESITY WITH BODY MASS INDEX (BMI) OF 35 TO 39.9 WITH SERIOUS COMORBIDITY (H): ICD-10-CM

## 2025-05-27 DIAGNOSIS — I48.91 ATRIAL FIBRILLATION, UNSPECIFIED TYPE (H): Primary | ICD-10-CM

## 2025-05-27 LAB
ANION GAP SERPL CALCULATED.3IONS-SCNC: 9 MMOL/L (ref 7–15)
BUN SERPL-MCNC: 17.4 MG/DL (ref 8–23)
CALCIUM SERPL-MCNC: 9.5 MG/DL (ref 8.8–10.4)
CHLORIDE SERPL-SCNC: 104 MMOL/L (ref 98–107)
CREAT SERPL-MCNC: 1.29 MG/DL (ref 0.67–1.17)
EGFRCR SERPLBLD CKD-EPI 2021: 58 ML/MIN/1.73M2
GLUCOSE SERPL-MCNC: 116 MG/DL (ref 70–99)
HCO3 SERPL-SCNC: 26 MMOL/L (ref 22–29)
INR BLD: 2.3 (ref 0.9–1.1)
POTASSIUM SERPL-SCNC: 4.5 MMOL/L (ref 3.4–5.3)
SODIUM SERPL-SCNC: 139 MMOL/L (ref 135–145)

## 2025-05-27 PROCEDURE — 80051 ELECTROLYTE PANEL: CPT | Mod: ZL | Performed by: FAMILY MEDICINE

## 2025-05-27 PROCEDURE — 85610 PROTHROMBIN TIME: CPT | Mod: ZL | Performed by: FAMILY MEDICINE

## 2025-05-27 PROCEDURE — 36415 COLL VENOUS BLD VENIPUNCTURE: CPT | Mod: ZL | Performed by: FAMILY MEDICINE

## 2025-05-27 PROCEDURE — G0463 HOSPITAL OUTPT CLINIC VISIT: HCPCS

## 2025-05-27 RX ORDER — AMLODIPINE BESYLATE 5 MG/1
5 TABLET ORAL DAILY
Qty: 90 TABLET | Refills: 3 | Status: SHIPPED | OUTPATIENT
Start: 2025-05-27

## 2025-05-27 ASSESSMENT — PAIN SCALES - GENERAL: PAINLEVEL_OUTOF10: NO PAIN (0)

## 2025-05-27 NOTE — PROGRESS NOTES
"  Assessment & Plan     Benign essential HTN  Stable.  Doing well.  Update for the whole year.  Update bmp as well.    - amLODIPine (NORVASC) 5 MG tablet; Take 1 tablet (5 mg) by mouth daily.    Renal insufficiency  Update bmp pending.  Doing well.    - Basic metabolic panel; Future  - Basic metabolic panel    Class 2 severe obesity with body mass index (BMI) of 35 to 39.9 with serious comorbidity (H)  Stable.      Atrial fibrillation, unspecified type (H)  Ongoing.  Update inr.    - INR point of care ( [facility] clinic ONLY)    Long term current use of anticoagulant therapy  Update INR.  Rate controlled afib.  On coumadin.    - INR point of care ( [facility] clinic ONLY)          BMI  Estimated body mass index is 38.02 kg/m  as calculated from the following:    Height as of 4/17/25: 1.778 m (5' 10\").    Weight as of this encounter: 120.2 kg (265 lb).             Jackie Duncan is a 74 year old, presenting for the following health issues:  Lipids        5/27/2025     9:29 AM   Additional Questions   Roomed by Vero FARRAR      Hyperlipidemia Follow-Up    Are you regularly taking any medication or supplement to lower your cholesterol?   Yes- lipitor  Are you having muscle aches or other side effects that you think could be caused by your cholesterol lowering medication?  No        Review of Systems  Constitutional, HEENT, cardiovascular, pulmonary, gi and gu systems are negative, except as otherwise noted.      Objective    /78   Pulse 74   Temp 97.5  F (36.4  C) (Tympanic)   Wt 120.2 kg (265 lb)   SpO2 97%   BMI 38.02 kg/m    Body mass index is 38.02 kg/m .  Physical Exam   GENERAL: alert and no distress  NECK: no adenopathy, no asymmetry, masses, or scars  RESP: lungs clear to auscultation - no rales, rhonchi or wheezes  CV: irregularly irregular rhythm, normal S1 S2, no S3 or S4, no murmur, click or rub, peripheral pulses strong, and no peripheral edema  ABDOMEN: soft, nontender, no hepatosplenomegaly, no masses " and bowel sounds normal  MS: no gross musculoskeletal defects noted, no edema    Bmp pending.  Amlodipine sent.  No dose change.  Update labs when due for dm, etc.  Nice review.      The longitudinal plan of care for the diagnosis(es)/condition(s) as documented were addressed during this visit. Due to the added complexity in care, I will continue to support Dakota in the subsequent management and with ongoing continuity of care.        Signed Electronically by: Nestor Alba MD

## 2025-05-27 NOTE — PROGRESS NOTES
ANTICOAGULATION MANAGEMENT     Dakota Gaston 74 year old male is on warfarin with therapeutic INR result. (Goal INR 2.0-3.0)    Recent labs: (last 7 days)     05/27/25  0943   INR 2.3*       ASSESSMENT     Source(s): Chart Review and Patient/Caregiver Call     Warfarin doses taken: Warfarin taken as instructed  Diet: No new diet changes identified  Medication/supplement changes: None noted  New illness, injury, or hospitalization: No  Signs or symptoms of bleeding or clotting: No  Previous result: Therapeutic last visit; previously outside of goal range  Additional findings: None       PLAN     Recommended plan for no diet, medication or health factor changes affecting INR     Dosing Instructions: Continue your current warfarin dose with next INR in 4 weeks       Summary  As of 5/27/2025      Full warfarin instructions:  2.5 mg every Tue, Wed, Thu; 5 mg all other days   Next INR check:  6/24/2025               Telephone call with Dakota who verbalizes understanding and agrees to plan and who agrees to plan and repeated back plan correctly    Patient offered & declined to schedule next visit    Education provided: Please call back if any changes to your diet, medications or how you've been taking warfarin    Plan made per [facility] anticoagulation protocol    Adolfo Honeycutt RN  5/27/2025  Anticoagulation Clinic  [employer] for routing messages: p JAREK BAUER          _______________________________________________________________________     Anticoagulation Episode Summary       Current INR goal:  2.0-3.0   TTR:  52.8% (3 mo)   Target end date:  Indefinite   Send INR reminders to:  JAREK BAUER    Indications    Atrial fibrillation  unspecified type (H) [I48.91]             Comments:  --             Anticoagulation Care Providers       Provider Role Specialty Phone number    Nestor Alba MD Referring Family Medicine [phone]

## 2025-06-02 ENCOUNTER — TELEPHONE (OUTPATIENT)
Dept: FAMILY MEDICINE | Facility: OTHER | Age: 75
End: 2025-06-02

## 2025-06-02 NOTE — TELEPHONE ENCOUNTER
12:36 PM    Reason for Call: OVERBOOK    Patient is having the following symptoms: neck/lt shoulder radiating down arm and chest/injury. Tylenol not helping/playing golf earlier in the week, not sure if he pulled something    The patient is requesting an appointment for this week with Dr Alba.    Was an appointment offered for this call? No  If yes : Appointment type              Date    Preferred method for responding to this message: Telephone Call  What is your phone number ?[phone]     If we cannot reach you directly, may we leave a detailed response at the number you provided? Yes    Can this message wait until your PCP/provider returns, if unavailable today? Not applicable    Daksha Valdez

## 2025-06-02 NOTE — PROGRESS NOTES
"  Assessment & Plan     Cervical radiculopathy  Presumed.  Classic sx with history of cervical fusion.  Update imaging.  Severe pain.  Steroid burst.  1 week followup in clinic.  Clearly MSK in nature with the pain.    - predniSONE (DELTASONE) 20 MG tablet; Take 1 tablet (20 mg) by mouth 2 times daily for 5 days.  - MR Cervical Spine w/o Contrast; Future  - XR SHOULDER LEFT G/E 2 VIEWS (Clinic Performed)  - XR CERVICAL SPINE G/E 4 VIEWS (Clinic Performed); Future          BMI  Estimated body mass index is 38.02 kg/m  as calculated from the following:    Height as of 4/17/25: 1.778 m (5' 10\").    Weight as of this encounter: 120.2 kg (265 lb).             Jackie Duncan is a 74 year old, presenting for the following health issues:  Musculoskeletal Problem        6/3/2025     8:52 AM   Additional Questions   Roomed by Vero FARRAR        Musculoskeletal problem/pain    Duration: Thursday   Description  Location: left side of neck into left shoulder/chest and down left arm   Intensity:  severe  Accompanying signs and symptoms: none  History  Previous similar problem: no   Previous evaluation:  none  Precipitating or alleviating factors:  Trauma or overuse: YES- overuse   Aggravating factors include: movement   Therapies tried and outcome: rest/inactivity, ice, acetaminophen, Ibuprofen, and icy/hot         Review of Systems  Constitutional, HEENT, cardiovascular, pulmonary, gi and gu systems are negative, except as otherwise noted.      Objective    BP (!) 146/84   Pulse 80   Temp 97.3  F (36.3  C) (Tympanic)   Wt 120.2 kg (265 lb)   SpO2 98%   BMI 38.02 kg/m    Body mass index is 38.02 kg/m .  Physical Exam   GENERAL: alert and no distress  NECK: no adenopathy, no asymmetry, masses, or scars  RESP: lungs clear to auscultation - no rales, rhonchi or wheezes  CV: irregular rate and rhythm, normal S1 S2, no S3 or S4, no murmur, click or rub, no peripheral edema  ABDOMEN: soft, nontender, no hepatosplenomegaly, " no masses and bowel sounds normal  MS: pain left shoulder and trapezius area which is very painful to head rotation and any shoulder movement.      Xray shoulder and neck stable.  MRI neck pending.  Steroid sent.      The longitudinal plan of care for the diagnosis(es)/condition(s) as documented were addressed during this visit. Due to the added complexity in care, I will continue to support Dakota in the subsequent management and with ongoing continuity of care.        Signed Electronically by: Nestor Alba MD

## 2025-06-03 ENCOUNTER — RESULTS FOLLOW-UP (OUTPATIENT)
Dept: FAMILY MEDICINE | Facility: OTHER | Age: 75
End: 2025-06-03

## 2025-06-03 ENCOUNTER — ANCILLARY PROCEDURE (OUTPATIENT)
Dept: GENERAL RADIOLOGY | Facility: OTHER | Age: 75
End: 2025-06-03
Attending: FAMILY MEDICINE
Payer: MEDICARE

## 2025-06-03 ENCOUNTER — OFFICE VISIT (OUTPATIENT)
Dept: FAMILY MEDICINE | Facility: OTHER | Age: 75
End: 2025-06-03
Attending: FAMILY MEDICINE
Payer: MEDICARE

## 2025-06-03 VITALS
DIASTOLIC BLOOD PRESSURE: 84 MMHG | WEIGHT: 265 LBS | BODY MASS INDEX: 38.02 KG/M2 | OXYGEN SATURATION: 98 % | TEMPERATURE: 97.3 F | SYSTOLIC BLOOD PRESSURE: 146 MMHG | HEART RATE: 80 BPM

## 2025-06-03 DIAGNOSIS — M54.12 CERVICAL RADICULOPATHY: Primary | ICD-10-CM

## 2025-06-03 DIAGNOSIS — M54.12 CERVICAL RADICULOPATHY: ICD-10-CM

## 2025-06-03 PROCEDURE — 73030 X-RAY EXAM OF SHOULDER: CPT | Mod: 26 | Performed by: RADIOLOGY

## 2025-06-03 PROCEDURE — 72050 X-RAY EXAM NECK SPINE 4/5VWS: CPT | Mod: 26 | Performed by: RADIOLOGY

## 2025-06-03 PROCEDURE — G0463 HOSPITAL OUTPT CLINIC VISIT: HCPCS

## 2025-06-03 PROCEDURE — 73030 X-RAY EXAM OF SHOULDER: CPT | Mod: TC,LT,FY

## 2025-06-03 PROCEDURE — 72050 X-RAY EXAM NECK SPINE 4/5VWS: CPT | Mod: TC,FY

## 2025-06-03 RX ORDER — PREDNISONE 20 MG/1
20 TABLET ORAL 2 TIMES DAILY
Qty: 10 TABLET | Refills: 0 | Status: SHIPPED | OUTPATIENT
Start: 2025-06-03 | End: 2025-06-08

## 2025-06-03 ASSESSMENT — PAIN SCALES - GENERAL: PAINLEVEL_OUTOF10: SEVERE PAIN (8)

## 2025-06-05 NOTE — PROGRESS NOTES
"  Assessment & Plan     Chronic left shoulder pain  Improved with the steroid.  Offer shot following MRI but await MRI.  Continue with gentle ROM.  Clearly improved but still pain into the shoulder but way better with the oral steroid.  Suspicious of the neck and MRI upcoming on the cervical spine.  Workup this shoulder directly if this isn't the explanation for the pain.            BMI  Estimated body mass index is 38.27 kg/m  as calculated from the following:    Height as of 4/17/25: 1.778 m (5' 10\").    Weight as of this encounter: 121 kg (266 lb 11.2 oz).             Subjective   Dakota is a 74 year old, presenting for the following health issues:  Shoulder left        6/9/2025     3:54 PM   Additional Questions   Roomed by Liset FARRAR      Medication Followup of prednisone   Taking Medication as prescribed: yes  Side Effects:  None  Medication Helping Symptoms:  yes      Review of Systems  Constitutional, HEENT, cardiovascular, pulmonary, gi and gu systems are negative, except as otherwise noted.      Objective    /80 (BP Location: Right arm, Patient Position: Sitting, Cuff Size: Adult Regular)   Pulse 75   Temp 96.9  F (36.1  C) (Tympanic)   Wt 121 kg (266 lb 11.2 oz)   SpO2 96%   BMI 38.27 kg/m    Body mass index is 38.27 kg/m .  Physical Exam   GENERAL: alert and no distress  NECK: no adenopathy, no asymmetry, masses, or scars  RESP: lungs clear to auscultation - no rales, rhonchi or wheezes  CV: regular rate and rhythm, normal S1 S2, no S3 or S4, no murmur, click or rub, no peripheral edema  ABDOMEN: soft, nontender, no hepatosplenomegaly, no masses and bowel sounds normal  MS: pain with shoulder abduction radiating up to the neck and down the deltoid.      Xray stable.  MRI cervical pending.      The longitudinal plan of care for the diagnosis(es)/condition(s) as documented were addressed during this visit. Due to the added complexity in care, I will continue to support Dakota in the " subsequent management and with ongoing continuity of care.        Signed Electronically by: Nestor Alba MD

## 2025-06-09 ENCOUNTER — OFFICE VISIT (OUTPATIENT)
Dept: FAMILY MEDICINE | Facility: OTHER | Age: 75
End: 2025-06-09
Attending: FAMILY MEDICINE
Payer: COMMERCIAL

## 2025-06-09 VITALS
SYSTOLIC BLOOD PRESSURE: 134 MMHG | BODY MASS INDEX: 38.27 KG/M2 | WEIGHT: 266.7 LBS | HEART RATE: 75 BPM | DIASTOLIC BLOOD PRESSURE: 80 MMHG | TEMPERATURE: 96.9 F | OXYGEN SATURATION: 96 %

## 2025-06-09 DIAGNOSIS — M25.512 CHRONIC LEFT SHOULDER PAIN: Primary | ICD-10-CM

## 2025-06-09 DIAGNOSIS — G89.29 CHRONIC LEFT SHOULDER PAIN: Primary | ICD-10-CM

## 2025-06-09 PROCEDURE — 3079F DIAST BP 80-89 MM HG: CPT | Performed by: FAMILY MEDICINE

## 2025-06-09 PROCEDURE — 99213 OFFICE O/P EST LOW 20 MIN: CPT | Performed by: FAMILY MEDICINE

## 2025-06-09 PROCEDURE — G0463 HOSPITAL OUTPT CLINIC VISIT: HCPCS

## 2025-06-09 PROCEDURE — 3075F SYST BP GE 130 - 139MM HG: CPT | Performed by: FAMILY MEDICINE

## 2025-06-09 PROCEDURE — G2211 COMPLEX E/M VISIT ADD ON: HCPCS | Performed by: FAMILY MEDICINE

## 2025-06-09 PROCEDURE — 1125F AMNT PAIN NOTED PAIN PRSNT: CPT | Performed by: FAMILY MEDICINE

## 2025-06-09 ASSESSMENT — PAIN SCALES - GENERAL: PAINLEVEL_OUTOF10: MILD PAIN (2)

## 2025-06-20 ENCOUNTER — HOSPITAL ENCOUNTER (OUTPATIENT)
Dept: MRI IMAGING | Facility: HOSPITAL | Age: 75
Discharge: HOME | End: 2025-06-20
Attending: FAMILY MEDICINE | Admitting: RADIOLOGY
Payer: MEDICARE

## 2025-06-20 DIAGNOSIS — M54.12 CERVICAL RADICULOPATHY: ICD-10-CM

## 2025-06-20 PROCEDURE — 72141 MRI NECK SPINE W/O DYE: CPT | Mod: 26 | Performed by: RADIOLOGY

## 2025-06-20 PROCEDURE — 72141 MRI NECK SPINE W/O DYE: CPT

## 2025-06-25 ENCOUNTER — TELEPHONE (OUTPATIENT)
Dept: FAMILY MEDICINE | Facility: OTHER | Age: 75
End: 2025-06-25

## 2025-06-25 NOTE — TELEPHONE ENCOUNTER
12:11 PM    Reason for Call: Phone Call    Description:   Melo called you back. Call him at your convenience.      Preferred method for responding to this message: Telephone Call  What is your phone number ?  [phone]         Sara Amos

## 2025-06-25 NOTE — TELEPHONE ENCOUNTER
Neck doing ok right now since the prednisone.  He will hold on the shot for now.  He can message or call us and we can order epidural TERRIE if he determines he needs it.  Nice review.  Nestor Alba MD

## 2025-07-08 ENCOUNTER — LAB (OUTPATIENT)
Dept: LAB | Facility: OTHER | Age: 75
End: 2025-07-08
Payer: MEDICARE

## 2025-07-08 ENCOUNTER — ANTICOAGULATION THERAPY VISIT (OUTPATIENT)
Dept: ANTICOAGULATION | Facility: OTHER | Age: 75
End: 2025-07-08
Attending: FAMILY MEDICINE
Payer: COMMERCIAL

## 2025-07-08 DIAGNOSIS — Z79.01 LONG TERM CURRENT USE OF ANTICOAGULANT THERAPY: ICD-10-CM

## 2025-07-08 DIAGNOSIS — I48.91 ATRIAL FIBRILLATION, UNSPECIFIED TYPE (H): Primary | ICD-10-CM

## 2025-07-08 DIAGNOSIS — I48.91 ATRIAL FIBRILLATION, UNSPECIFIED TYPE (H): ICD-10-CM

## 2025-07-08 LAB — INR BLD: 1.5 (ref 0.9–1.1)

## 2025-07-08 PROCEDURE — 36416 COLLJ CAPILLARY BLOOD SPEC: CPT | Mod: ZL

## 2025-07-08 PROCEDURE — 85610 PROTHROMBIN TIME: CPT | Mod: ZL

## 2025-07-08 NOTE — PROGRESS NOTES
ANTICOAGULATION MANAGEMENT     Dakota Gaston 74 year old male is on warfarin with subtherapeutic INR result. (Goal INR 2.0-3.0)    Recent labs: (last 7 days)     07/08/25  1119   INR 1.5*       ASSESSMENT     Source(s): Chart Review and Patient/Caregiver Call     Warfarin doses taken: Missed dose(s) may be affecting INR  Diet: Increased greens/vitamin K in diet; plans to resume previous intake  Medication/supplement changes: None noted  New illness, injury, or hospitalization: No  Signs or symptoms of bleeding or clotting: No  Previous result: Therapeutic last 2(+) visits  Additional findings: None       PLAN     Recommended plan for no diet, medication or health factor changes and previous 2 INR results within goal affecting INR     Dosing Instructions: booster dose then continue your current warfarin dose with next INR in 2 weeks       Summary  As of 7/8/2025      Full warfarin instructions:  7/8: 5 mg; Otherwise 2.5 mg every Tue, Wed, Thu; 5 mg all other days   Next INR check:  7/29/2025               Telephone call with Dakota who verbalizes understanding and agrees to plan    Patient elected to schedule next visit      Education provided: None required    Plan made per [facility] anticoagulation protocol    Keara Bowie RN  7/8/2025  Anticoagulation Clinic  [employer] for routing messages: martín BAUER  [facility] patient phone line: [phone]        _______________________________________________________________________     Anticoagulation Episode Summary       Current INR goal:  2.0-3.0   TTR:  47.9% (4.4 mo)   Target end date:  Indefinite   Send INR reminders to:  JAREK BAUER    Indications    Atrial fibrillation  unspecified type (H) [I48.91]             Comments:  --             Anticoagulation Care Providers       Provider Role Specialty Phone number    Nestor Alba MD [facility] Family Medicine [phone]

## 2025-07-22 ENCOUNTER — LAB (OUTPATIENT)
Dept: LAB | Facility: OTHER | Age: 75
End: 2025-07-22
Payer: MEDICARE

## 2025-07-22 ENCOUNTER — ANTICOAGULATION THERAPY VISIT (OUTPATIENT)
Dept: ANTICOAGULATION | Facility: OTHER | Age: 75
End: 2025-07-22
Payer: COMMERCIAL

## 2025-07-22 DIAGNOSIS — I48.91 ATRIAL FIBRILLATION, UNSPECIFIED TYPE (H): ICD-10-CM

## 2025-07-22 DIAGNOSIS — Z79.01 LONG TERM CURRENT USE OF ANTICOAGULANT THERAPY: ICD-10-CM

## 2025-07-22 DIAGNOSIS — I48.91 ATRIAL FIBRILLATION, UNSPECIFIED TYPE (H): Primary | ICD-10-CM

## 2025-07-22 LAB — INR BLD: 2.5 (ref 0.9–1.1)

## 2025-07-22 PROCEDURE — 85610 PROTHROMBIN TIME: CPT | Mod: ZL

## 2025-07-22 PROCEDURE — 36416 COLLJ CAPILLARY BLOOD SPEC: CPT | Mod: ZL

## 2025-07-22 NOTE — PROGRESS NOTES
ANTICOAGULATION MANAGEMENT     Dakota Gaston 74 year old male is on warfarin with therapeutic INR result. (Goal INR 2.0-3.0)    Recent labs: (last 7 days)     07/22/25  1336   INR 2.5*       ASSESSMENT     Source(s): Chart Review and Patient/Caregiver Call     Warfarin doses taken: Warfarin taken as instructed  Diet: No new diet changes identified  Medication/supplement changes: None noted  New illness, injury, or hospitalization: No  Signs or symptoms of bleeding or clotting: No  Previous result: Subtherapeutic  Additional findings: None       PLAN     Recommended plan for no diet, medication or health factor changes affecting INR     Dosing Instructions: Continue your current warfarin dose with next INR in 4 weeks       Summary  As of 7/22/2025      Full warfarin instructions:  2.5 mg every Tue, Wed, Thu; 5 mg all other days   Next INR check:  8/19/2025               Telephone call with Dakota who verbalizes understanding and agrees to plan    Patient elected to schedule next visit 8/19/25    Education provided: Please call back if any changes to your diet, medications or how you've been taking warfarin    Plan made per [facility] anticoagulation protocol    Keara Bowie RN  7/22/2025  Anticoagulation Clinic  [employer] for routing messages: martín BAUER  [facility] patient phone line:[phone]        _______________________________________________________________________     Anticoagulation Episode Summary       Current INR goal:  2.0-3.0   TTR:  48.1% (4.8 mo)   Target end date:  Indefinite   Send INR reminders to:  JAREK BAUER    Indications    Atrial fibrillation  unspecified type (H) [I48.91]             Comments:  --             Anticoagulation Care Providers       Provider Role Specialty Phone number    Nestor Alba MD [facility] Family Medicine [phone]

## 2025-08-21 ENCOUNTER — LAB (OUTPATIENT)
Dept: LAB | Facility: OTHER | Age: 75
End: 2025-08-21
Payer: MEDICARE

## 2025-08-21 ENCOUNTER — ANTICOAGULATION THERAPY VISIT (OUTPATIENT)
Dept: ANTICOAGULATION | Facility: OTHER | Age: 75
End: 2025-08-21
Attending: FAMILY MEDICINE
Payer: COMMERCIAL

## 2025-08-21 DIAGNOSIS — Z79.01 LONG TERM CURRENT USE OF ANTICOAGULANT THERAPY: ICD-10-CM

## 2025-08-21 DIAGNOSIS — I48.91 ATRIAL FIBRILLATION, UNSPECIFIED TYPE (H): Primary | ICD-10-CM

## 2025-08-21 DIAGNOSIS — I48.91 ATRIAL FIBRILLATION, UNSPECIFIED TYPE (H): ICD-10-CM

## 2025-08-21 LAB — INR BLD: 1.4 (ref 0.9–1.1)

## 2025-08-21 PROCEDURE — 85610 PROTHROMBIN TIME: CPT | Mod: ZL

## 2025-08-21 PROCEDURE — 36416 COLLJ CAPILLARY BLOOD SPEC: CPT | Mod: ZL

## 2025-09-04 ENCOUNTER — LAB (OUTPATIENT)
Dept: LAB | Facility: OTHER | Age: 75
End: 2025-09-04
Attending: FAMILY MEDICINE
Payer: MEDICARE

## 2025-09-04 ENCOUNTER — ANTICOAGULATION THERAPY VISIT (OUTPATIENT)
Dept: ANTICOAGULATION | Facility: OTHER | Age: 75
End: 2025-09-04
Attending: FAMILY MEDICINE
Payer: COMMERCIAL

## 2025-09-04 DIAGNOSIS — I48.91 ATRIAL FIBRILLATION, UNSPECIFIED TYPE (H): ICD-10-CM

## 2025-09-04 DIAGNOSIS — I48.91 ATRIAL FIBRILLATION, UNSPECIFIED TYPE (H): Primary | ICD-10-CM

## 2025-09-04 DIAGNOSIS — Z79.01 LONG TERM CURRENT USE OF ANTICOAGULANT THERAPY: ICD-10-CM

## 2025-09-04 LAB — INR BLD: 2.5 (ref 0.9–1.1)

## 2025-09-04 PROCEDURE — 85610 PROTHROMBIN TIME: CPT | Mod: ZL

## 2025-09-04 PROCEDURE — 36416 COLLJ CAPILLARY BLOOD SPEC: CPT | Mod: ZL

## (undated) DEVICE — SOL WATER IRRIG 1000ML BOTTLE 2F7114

## (undated) DEVICE — CONNECTOR ERBEFLO 2 PORT 20325-215

## (undated) DEVICE — CANISTER SUCTION MEDI-VAC GUARDIAN 2000ML 90D 65651-220

## (undated) DEVICE — SUCTION MANIFOLD NEPTUNE 2 SYS 1 PORT 702-025-000

## (undated) DEVICE — TUBING SUCTION 20FT N620A

## (undated) RX ORDER — DEXMEDETOMIDINE HYDROCHLORIDE 4 UG/ML
INJECTION, SOLUTION INTRAVENOUS
Status: DISPENSED
Start: 2025-04-17

## (undated) RX ORDER — PROPOFOL 10 MG/ML
INJECTION, EMULSION INTRAVENOUS
Status: DISPENSED
Start: 2025-04-17